# Patient Record
Sex: FEMALE | Race: WHITE | NOT HISPANIC OR LATINO | Employment: FULL TIME | URBAN - METROPOLITAN AREA
[De-identification: names, ages, dates, MRNs, and addresses within clinical notes are randomized per-mention and may not be internally consistent; named-entity substitution may affect disease eponyms.]

---

## 2017-01-23 DIAGNOSIS — I10 BENIGN ESSENTIAL HYPERTENSION: Primary | ICD-10-CM

## 2017-01-23 NOTE — TELEPHONE ENCOUNTER
hydrochlorothiazide (HYDRODIURIL) 25 MG tablet 90 tablet 0 10/28/2016          Last Written Prescription Date: 10/28/2016  Last Fill Quantity: 90, # refills: 0  Last Office Visit with [facility], P or [facility] prescribing provider: 12/16/2016   Next 5 appointments (look out 90 days)     Jan 24, 2017  8:30 AM   [facility] Visit with Zeke Todd MD   [facility]    [address] [address]   [phone]                   POTASSIUM   Date Value Ref Range Status   12/21/2015 3.8 mmol/L Final     CREATININE   Date Value Ref Range Status   02/19/2016 48 mg/dL Final     BP Readings from Last 3 Encounters:   12/16/16 128/88   11/16/16 133/94   01/15/16 110/83

## 2017-01-25 RX ORDER — HYDROCHLOROTHIAZIDE 25 MG/1
TABLET ORAL
Qty: 90 TABLET | Refills: 3 | Status: SHIPPED | OUTPATIENT
Start: 2017-01-25 | End: 2017-07-25

## 2017-02-09 ENCOUNTER — OFFICE VISIT (OUTPATIENT)
Dept: FAMILY MEDICINE | Facility: CLINIC | Age: 52
End: 2017-02-09
Payer: COMMERCIAL

## 2017-02-09 VITALS
HEART RATE: 74 BPM | TEMPERATURE: 96.7 F | DIASTOLIC BLOOD PRESSURE: 91 MMHG | SYSTOLIC BLOOD PRESSURE: 128 MMHG | WEIGHT: 195 LBS | HEIGHT: 63 IN | BODY MASS INDEX: 34.55 KG/M2 | OXYGEN SATURATION: 95 %

## 2017-02-09 PROCEDURE — 99213 OFFICE O/P EST LOW 20 MIN: CPT | Performed by: INTERNAL MEDICINE

## 2017-02-09 RX ORDER — PHENTERMINE HYDROCHLORIDE 30 MG/1
30 CAPSULE ORAL EVERY MORNING
Qty: 30 CAPSULE | Refills: 0 | Status: SHIPPED | OUTPATIENT
Start: 2017-02-09 | End: 2017-03-23

## 2017-02-09 ASSESSMENT — ENCOUNTER SYMPTOMS
ABDOMINAL PAIN: 0
NERVOUS/ANXIOUS: 0
DIARRHEA: 0
TREMORS: 0
CONSTIPATION: 0
SHORTNESS OF BREATH: 0
DEPRESSION: 0
NAUSEA: 0
INSOMNIA: 0
HEADACHES: 0
PALPITATIONS: 0
VOMITING: 0

## 2017-02-09 ASSESSMENT — LIFESTYLE VARIABLES: SUBSTANCE_ABUSE: 0

## 2017-02-09 NOTE — PROGRESS NOTES
"  SUBJECTIVE:                                                    Susi Hernandezsury Rossi is a 51 year old female who presents to clinic today for the following health issues:      Medication Followup of Phentermine    Taking Medication as prescribed: yes    Side Effects:  None    Medication Helping Symptoms:  yes       {additional problems for provider to add:385899}    Problem list and histories reviewed & adjusted, as indicated.  Additional history: {NONE - AS DOCUMENTED:327618::\"as documented\"}    {HIST REVIEW/ LINKS 2:351590}    {PROVIDER CHARTING PREFERENCE:648718}  "

## 2017-02-09 NOTE — NURSING NOTE
"Chief Complaint   Patient presents with     RECHECK       Initial /91 mmHg  Pulse 74  Temp(Src) 96.7  F (35.9  C) (Tympanic)  Ht 5' 3\" (1.6 m)  Wt 195 lb (88.451 kg)  BMI 34.55 kg/m2  SpO2 95% Estimated body mass index is 34.55 kg/(m^2) as calculated from the following:    Height as of this encounter: 5' 3\" (1.6 m).    Weight as of this encounter: 195 lb (88.451 kg).  Medication Reconciliation: complete   Left arm, large cuff.   Nolvia Warren MA      "

## 2017-02-09 NOTE — MR AVS SNAPSHOT
"              After Visit Summary   2/9/2017    Susi Rossi    MRN: [id number]           Patient Information     Date Of Birth          1965        Visit Information        Provider Department      2/9/2017 9:30 AM Zeke Todd MD [facility]        Today's Diagnoses     BMI 34.0-34.9,adult    -  1       Care Instructions    Continue with the meal plan.  Maintain 0501-2690 daily calorie intake.    Follow up in 1 month.                Follow-ups after your visit        Who to contact     If you have questions or need follow up information about today's clinic visit or your schedule please contact [facility] directly at [phone].  Normal or non-critical lab and imaging results will be communicated to you by MyChart, letter or phone within 4 business days after the clinic has received the results. If you do not hear from us within 7 days, please contact the clinic through MyChart or phone. If you have a critical or abnormal lab result, we will notify you by phone as soon as possible.  Submit refill requests through [employer] or call your pharmacy and they will forward the refill request to us. Please allow 3 business days for your refill to be completed.          Additional Information About Your Visit        Care EveryWhere ID     This is your Care EveryWhere ID. This could be used by other organizations to access your [facility] medical records  [id number]        Your Vitals Were     Pulse Temperature Height BMI (Body Mass Index) Pulse Oximetry       74 96.7  F (35.9  C) (Tympanic) 5' 3\" (1.6 m) 34.55 kg/m2 95%        Blood Pressure from Last 3 Encounters:   02/09/17 128/91   12/16/16 128/88   11/16/16 133/94    Weight from Last 3 Encounters:   02/09/17 195 lb (88.451 kg)   12/16/16 194 lb (87.998 kg)   11/16/16 195 lb (88.451 kg)              Today, you had the following     No orders found for display         Where to get your medicines      Some of " these will need a paper prescription and others can be bought over the counter.  Ask your nurse if you have questions.     Bring a paper prescription for each of these medications    - phentermine 30 MG capsule       Primary Care Provider Office Phone # Fax #    Zeke Darwin Todd [phone]       [facility] [address]  [facility] [address]        Thank you!     Thank you for choosing [facility]  for your care. Our goal is always to provide you with excellent care. Hearing back from our patients is one way we can continue to improve our services. Please take a few minutes to complete the written survey that you may receive in the mail after your visit with us. Thank you!             Your Updated Medication List - Protect others around you: Learn how to safely use, store and throw away your medicines at www.disposemymeds.org.          This list is accurate as of: 2/9/17  9:47 AM.  Always use your most recent med list.                   Brand Name Dispense Instructions for use    ASPIRIN EC PO      Take 81 mg by mouth daily       ATORVASTATIN CALCIUM PO      Take 10 mg by mouth daily       * hydrochlorothiazide 25 MG tablet    HYDRODIURIL    30 tablet    TAKE 1 TABLET (25 MG) BY MOUTH DAILY       * hydrochlorothiazide 25 MG tablet    HYDRODIURIL    90 tablet    TAKE 1 TABLET (25 MG) BY MOUTH DAILY       LEVOTHROID PO      Take 175 mcg by mouth daily       multivitamin, therapeutic with minerals Tabs tablet      Take 1 tablet by mouth daily Has been using Emergen C vitamins       order for DME     1 Device    Equipment being ordered: blood pressure cuff       phentermine 30 MG capsule     30 capsule    Take 1 capsule (30 mg) by mouth every morning       VITAMIN D3 PO      Take 5,000 Units by mouth daily       * Notice:  This list has 2 medication(s) that are the same as other medications prescribed for you. Read the directions carefully, and ask your  doctor or other care provider to review them with you.

## 2017-02-09 NOTE — PATIENT INSTRUCTIONS
Continue with the meal plan.  Maintain 8595-9504 daily calorie intake.    Follow up in 1 month.

## 2017-02-09 NOTE — PROGRESS NOTES
"HPI Comments:   I last saw the patient at the clinic on 12/16/2016 for weight management.  I started her on phentermine 30mg daily and advised her on a low calorie meal plan.    Since her last visit, she has gained 1 pound. She states that the phentermine is sufficient in controlling her appetite, but when she ran out of the medication it was most likely that her caloric intake increased. Although she uses the my fitness pal application, she does not check her daily caloric intake. Has not been consistently following the meal plan as well.      Past Medical History   Diagnosis Date     Thyroid disease      Benign essential hypertension      Gallstones      Hyperlipidemia        Review of Systems   Constitutional: Negative for malaise/fatigue.   Respiratory: Negative for shortness of breath.    Cardiovascular: Negative for chest pain and palpitations.   Gastrointestinal: Negative for nausea, vomiting, abdominal pain, diarrhea and constipation.   Neurological: Negative for tremors and headaches.   Psychiatric/Behavioral: Negative for depression and substance abuse. The patient is not nervous/anxious and does not have insomnia.        /91 mmHg  Pulse 74  Temp(Src) 96.7  F (35.9  C) (Tympanic)  Ht 5' 3\" (1.6 m)  Wt 195 lb (88.451 kg)  BMI 34.55 kg/m2  SpO2 95%      Physical Exam   Constitutional: She is oriented to person, place, and time. No distress.   Neck: No thyromegaly present.   Cardiovascular: Normal rate, regular rhythm and normal heart sounds.    Pulmonary/Chest: Effort normal and breath sounds normal. No respiratory distress.   Abdominal: Soft. There is no tenderness.   Neurological: She is alert and oriented to person, place, and time. Coordination normal. GCS score is 15.   No tremors   Psychiatric: Mood and affect normal.   Vitals reviewed.        ICD-10-CM    1. BMI 34.0-34.9,adult Z68.34 phentermine 30 MG capsule         Patient Instructions   Continue with the meal plan.  Maintain 8169-8093 " daily calorie intake.    Follow up in 1 month.

## 2017-03-15 NOTE — TELEPHONE ENCOUNTER
Pending Prescriptions:                       Disp   Refills    phentermine 30 MG capsule                 30 cap*0            Sig: Take 1 capsule (30 mg) by mouth every morning          Last Written Prescription Date: 02/09/17  Last Fill Quantity: 30,  # refills: 0   Last Office Visit with [facility], [facility] or [facility] prescribing provider: 02/09/17                                         Next 5 appointments (look out 90 days)     Mar 23, 2017  6:30 PM CDT   [facility] with [facility]   [facility]    [address] [facility] [address]   [phone]

## 2017-03-15 NOTE — TELEPHONE ENCOUNTER
Reason for Call:  Medication or medication refill:    Do you use a [facility]?  Name of the pharmacy and phone number for the current request:    [id number] - [place], [place] - [address]      Name of the medication requested: phentermine 30 MG capsule    Other request: pt changed appt to a later date.    Can we leave a detailed message on this number? YES    Phone number patient can be reached at: Home number on file [phone]    Best Time: anytime    Call taken on 3/15/2017 at 4:43 PM by Graciela Ashraf

## 2017-03-16 RX ORDER — PHENTERMINE HYDROCHLORIDE 30 MG/1
30 CAPSULE ORAL EVERY MORNING
Qty: 30 CAPSULE | Refills: 0 | OUTPATIENT
Start: 2017-03-16

## 2017-03-16 NOTE — TELEPHONE ENCOUNTER
Routing refill request to provider for review/approval because:  Drug not on the [facility] refill protocol   Annetta Rea RN

## 2017-03-23 ENCOUNTER — OFFICE VISIT (OUTPATIENT)
Dept: FAMILY MEDICINE | Facility: CLINIC | Age: 52
End: 2017-03-23
Payer: COMMERCIAL

## 2017-03-23 VITALS
BODY MASS INDEX: 34.12 KG/M2 | HEIGHT: 63 IN | HEART RATE: 82 BPM | SYSTOLIC BLOOD PRESSURE: 121 MMHG | TEMPERATURE: 97.1 F | WEIGHT: 192.6 LBS | DIASTOLIC BLOOD PRESSURE: 83 MMHG | OXYGEN SATURATION: 93 %

## 2017-03-23 PROCEDURE — 99213 OFFICE O/P EST LOW 20 MIN: CPT | Performed by: INTERNAL MEDICINE

## 2017-03-23 RX ORDER — PHENTERMINE HYDROCHLORIDE 30 MG/1
30 CAPSULE ORAL EVERY MORNING
Qty: 30 CAPSULE | Refills: 0 | Status: SHIPPED | OUTPATIENT
Start: 2017-03-23 | End: 2018-01-24

## 2017-03-23 ASSESSMENT — ENCOUNTER SYMPTOMS
CONSTIPATION: 0
TREMORS: 0
HEADACHES: 0
SHORTNESS OF BREATH: 0
ABDOMINAL PAIN: 0
DIARRHEA: 0
PALPITATIONS: 0
INSOMNIA: 0
VOMITING: 0
DEPRESSION: 0
NAUSEA: 0
NERVOUS/ANXIOUS: 0

## 2017-03-23 ASSESSMENT — LIFESTYLE VARIABLES: SUBSTANCE_ABUSE: 0

## 2017-03-23 NOTE — MR AVS SNAPSHOT
"              After Visit Summary   3/23/2017    Susi Rossi    MRN: [id number]           Patient Information     Date Of Birth          1965        Visit Information        Provider Department      3/23/2017 6:30 PM Zeke Todd MD [facility]        Today's Diagnoses     BMI 34.0-34.9,adult          Care Instructions    Try consuming a shake or fruits immediately before dinner.    Try to eat throughout the day.    Follow up in 1 month.         Follow-ups after your visit        Who to contact     If you have questions or need follow up information about today's clinic visit or your schedule please contact [facility] directly at [phone].  Normal or non-critical lab and imaging results will be communicated to you by [facility], letter or phone within 4 business days after the clinic has received the results. If you do not hear from us within 7 days, please contact the clinic through [facility] or phone. If you have a critical or abnormal lab result, we will notify you by phone as soon as possible.  Submit refill requests through [facility] or call your pharmacy and they will forward the refill request to us. Please allow 3 business days for your refill to be completed.          Additional Information About Your Visit        MyChart Information     [facility] lets you send messages to your doctor, view your test results, renew your prescriptions, schedule appointments and more. To sign up, go to [email or web address] . Click on \"Log in\" on the left side of the screen, which will take you to the Welcome page. Then click on \"Sign up Now\" on the right side of the page.     You will be asked to enter the access code listed below, as well as some personal information. Please follow the directions to create your username and password.     Your access code is: [email or web address]  Expires: 2017  7:01 PM     Your access code will  in 90 days. If you need help or " "a new code, please call your [facility] clinic or [phone].        Care EveryWhere ID     This is your Care EveryWhere ID. This could be used by other organizations to access your [facility] medical records  [id number]        Your Vitals Were     Pulse Temperature Height Pulse Oximetry Breastfeeding? BMI (Body Mass Index)    82 97.1  F (36.2  C) (Tympanic) 5' 3\" (1.6 m) 93% No 34.12 kg/m2       Blood Pressure from Last 3 Encounters:   03/23/17 121/83   02/09/17 (!) 128/91   12/16/16 128/88    Weight from Last 3 Encounters:   03/23/17 192 lb 9.6 oz (87.4 kg)   02/09/17 195 lb (88.5 kg)   12/16/16 194 lb (88 kg)              Today, you had the following     No orders found for display         Where to get your medicines      Some of these will need a paper prescription and others can be bought over the counter.  Ask your nurse if you have questions.     Bring a paper prescription for each of these medications     phentermine 30 MG capsule          Primary Care Provider Office Phone # Fax #    Zeke Darwin Todd [phone]       [facility] [address] [facility] [address]  [facility] [address]        Thank you!     Thank you for choosing [facility]  for your care. Our goal is always to provide you with excellent care. Hearing back from our patients is one way we can continue to improve our services. Please take a few minutes to complete the written survey that you may receive in the mail after your visit with us. Thank you!             Your Updated Medication List - Protect others around you: Learn how to safely use, store and throw away your medicines at www.disposemymeds.org.          This list is accurate as of: 3/23/17  7:01 PM.  Always use your most recent med list.                   Brand Name Dispense Instructions for use    ASPIRIN EC PO      Take 81 mg by mouth daily       ATORVASTATIN CALCIUM PO      Take 10 mg by mouth daily       * hydrochlorothiazide 25 MG " tablet    HYDRODIURIL    30 tablet    TAKE 1 TABLET (25 MG) BY MOUTH DAILY       * hydrochlorothiazide 25 MG tablet    HYDRODIURIL    90 tablet    TAKE 1 TABLET (25 MG) BY MOUTH DAILY       LEVOTHROID PO      Take 175 mcg by mouth daily       multivitamin, therapeutic with minerals Tabs tablet      Take 1 tablet by mouth daily Has been using Emer[employer] C vitamins       order for DME     1 Device    Equipment being ordered: blood pressure cuff       phentermine 30 MG capsule     30 capsule    Take 1 capsule (30 mg) by mouth every morning       VITAMIN D3 PO      Take 5,000 Units by mouth daily       * Notice:  This list has 2 medication(s) that are the same as other medications prescribed for you. Read the directions carefully, and ask your doctor or other care provider to review them with you.

## 2017-03-23 NOTE — NURSING NOTE
"Chief Complaint   Patient presents with     Follow Up For     Weight Mangaement       Initial /83 (BP Location: Left arm, Patient Position: Chair, Cuff Size: Adult Regular)  Pulse 82  Temp 97.1  F (36.2  C) (Tympanic)  Ht 5' 3\" (1.6 m)  Wt 192 lb 9.6 oz (87.4 kg)  SpO2 93%  Breastfeeding? No  BMI 34.12 kg/m2 Estimated body mass index is 34.12 kg/(m^2) as calculated from the following:    Height as of this encounter: 5' 3\" (1.6 m).    Weight as of this encounter: 192 lb 9.6 oz (87.4 kg).  Medication Reconciliation: complete       BRITTANY Jessica MA March 23, 2017 6:18 PM      "

## 2017-03-24 NOTE — PATIENT INSTRUCTIONS
Try consuming a shake or fruits immediately before dinner.    Try to eat throughout the day.    Follow up in 1 month.

## 2017-06-27 ENCOUNTER — HOSPITAL ENCOUNTER (OUTPATIENT)
Dept: MAMMOGRAPHY | Facility: CLINIC | Age: 52
Discharge: HOME | End: 2017-06-27
Attending: OBSTETRICS & GYNECOLOGY | Admitting: OBSTETRICS & GYNECOLOGY
Payer: COMMERCIAL

## 2017-06-27 DIAGNOSIS — Z12.31 VISIT FOR SCREENING MAMMOGRAM: ICD-10-CM

## 2017-06-27 PROCEDURE — 77063 BREAST TOMOSYNTHESIS BI: CPT

## 2017-06-27 PROCEDURE — G0202 SCR MAMMO BI INCL CAD: HCPCS

## 2017-07-01 ENCOUNTER — TRANSFERRED RECORDS (OUTPATIENT)
Dept: HEALTH INFORMATION MANAGEMENT | Facility: CLINIC | Age: 52
End: 2017-07-01

## 2017-07-01 LAB — PAP SMEAR - HIM PATIENT REPORTED: NEGATIVE

## 2017-07-25 ENCOUNTER — OFFICE VISIT (OUTPATIENT)
Dept: URGENT CARE | Facility: URGENT CARE | Age: 52
End: 2017-07-25
Payer: COMMERCIAL

## 2017-07-25 VITALS
OXYGEN SATURATION: 98 % | DIASTOLIC BLOOD PRESSURE: 89 MMHG | SYSTOLIC BLOOD PRESSURE: 128 MMHG | HEART RATE: 86 BPM | TEMPERATURE: 98.1 F

## 2017-07-25 DIAGNOSIS — J06.9 VIRAL UPPER RESPIRATORY TRACT INFECTION: ICD-10-CM

## 2017-07-25 DIAGNOSIS — J30.2 ACUTE SEASONAL ALLERGIC RHINITIS, UNSPECIFIED TRIGGER: Primary | ICD-10-CM

## 2017-07-25 LAB
DEPRECATED S PYO AG THROAT QL EIA: NORMAL
MICRO REPORT STATUS: NORMAL
SPECIMEN SOURCE: NORMAL

## 2017-07-25 PROCEDURE — 87081 CULTURE SCREEN ONLY: CPT | Performed by: PHYSICIAN ASSISTANT

## 2017-07-25 PROCEDURE — 87880 STREP A ASSAY W/OPTIC: CPT | Performed by: PHYSICIAN ASSISTANT

## 2017-07-25 PROCEDURE — 99213 OFFICE O/P EST LOW 20 MIN: CPT | Performed by: PHYSICIAN ASSISTANT

## 2017-07-25 RX ORDER — FLUTICASONE PROPIONATE 50 MCG
1-2 SPRAY, SUSPENSION (ML) NASAL DAILY
Qty: 1 BOTTLE | Refills: 0 | Status: SHIPPED | OUTPATIENT
Start: 2017-07-25

## 2017-07-25 RX ORDER — CODEINE PHOSPHATE AND GUAIFENESIN 10; 100 MG/5ML; MG/5ML
1 SOLUTION ORAL EVERY 4 HOURS PRN
Qty: 120 ML | Refills: 0 | Status: SHIPPED | OUTPATIENT
Start: 2017-07-25 | End: 2018-01-24

## 2017-07-25 ASSESSMENT — ENCOUNTER SYMPTOMS
HEADACHES: 0
COUGH: 1
FEVER: 0
FOCAL WEAKNESS: 0
CHILLS: 0
DIARRHEA: 0
ABDOMINAL PAIN: 0
SORE THROAT: 1
SHORTNESS OF BREATH: 0
VOMITING: 0
NAUSEA: 0

## 2017-07-25 NOTE — MR AVS SNAPSHOT
"              After Visit Summary   7/25/2017    Susi Rossi    MRN: [id number]           Patient Information     Date Of Birth          1965        Visit Information        Provider Department      7/25/2017 8:45 PM Gladys Rivers PA-C [facility]        Today's Diagnoses     Acute seasonal allergic rhinitis, unspecified trigger    -  1    Viral upper respiratory tract infection           Follow-ups after your visit        Follow-up notes from your care team     Return if symptoms worsen or fail to improve.      Who to contact     If you have questions or need follow up information about today's clinic visit or your schedule please contact [facility] directly at [phone].  Normal or non-critical lab and imaging results will be communicated to you by MyChart, letter or phone within 4 business days after the clinic has received the results. If you do not hear from us within 7 days, please contact the clinic through MyChart or phone. If you have a critical or abnormal lab result, we will notify you by phone as soon as possible.  Submit refill requests through [employer] or call your pharmacy and they will forward the refill request to us. Please allow 3 business days for your refill to be completed.          Additional Information About Your Visit        MyChart Information     [employer] lets you send messages to your doctor, view your test results, renew your prescriptions, schedule appointments and more. To sign up, go to [email or web address] . Click on \"Log in\" on the left side of the screen, which will take you to the Welcome page. Then click on \"Sign up Now\" on the right side of the page.     You will be asked to enter the access code listed below, as well as some personal information. Please follow the directions to create your username and password.     Your access code is: [email or web address]  Expires: 10/26/2017  7:33 AM     Your access " code will  in 90 days. If you need help or a new code, please call your [facility] clinic or [phone].        Care EveryWhere ID     This is your Care EveryWhere ID. This could be used by other organizations to access your [facility] medical records  [id number]        Your Vitals Were     Pulse Temperature Pulse Oximetry Breastfeeding?          86 98.1  F (36.7  C) (Oral) 98% No         Blood Pressure from Last 3 Encounters:   17 128/89   17 121/83   17 (!) 128/91    Weight from Last 3 Encounters:   17 192 lb 9.6 oz (87.4 kg)   17 195 lb (88.5 kg)   16 194 lb (88 kg)              We Performed the Following     Beta strep group A culture     Strep, Rapid Screen          Today's Medication Changes          These changes are accurate as of: 17 11:59 PM.  If you have any questions, ask your nurse or doctor.               Start taking these medicines.        Dose/Directions    fluticasone 50 MCG/ACT spray   Commonly known as:  FLONASE   Used for:  Acute seasonal allergic rhinitis, unspecified trigger   Started by:  Gladys Rivers PA-C        Dose:  1-2 spray   Spray 1-2 sprays into both nostrils daily   Quantity:  1 Bottle   Refills:  0       guaiFENesin-codeine 100-10 MG/5ML Soln solution   Commonly known as:  ROBITUSSIN AC   Used for:  Viral upper respiratory tract infection   Started by:  Gladys Rivers PA-C        Dose:  1 tsp.   Take 5 mLs by mouth every 4 hours as needed for cough   Quantity:  120 mL   Refills:  0            Where to get your medicines      These medications were sent to [id number] - [place], [place] - [address]  [address] [address]     Phone:  [phone]     fluticasone 50 MCG/ACT spray         Some of these will need a paper prescription and others can be bought over the counter.  Ask your nurse if you have questions.     Bring a paper prescription for each of these medications     guaiFENesin-codeine  100-10 MG/5ML Soln solution                Primary Care Provider Office Phone # Fax #    Zeke Darwin Todd [phone]       [facility] [address]  [facility] [address]        Equal Access to Services     SUN MERIDA : Hadii aad ku hadasho Soomaali, waaxda luqadaha, qaybta kaalmada adeegyada, waxay idiin hayaan adecarolee khprestonjose lamilo fam. So [facility] [phone].    ATENCIÓN: Si habla español, tiene a rendon disposición servicios gratuitos de asistencia lingüística. [facility] [phone].    We comply with applicable federal civil rights laws and [place] laws. We do not discriminate on the basis of race, color, national origin, age, disability sex, sexual orientation or gender identity.            Thank you!     Thank you for choosing [facility]  for your care. Our goal is always to provide you with excellent care. Hearing back from our patients is one way we can continue to improve our services. Please take a few minutes to complete the written survey that you may receive in the mail after your visit with us. Thank you!             Your Updated Medication List - Protect others around you: Learn how to safely use, store and throw away your medicines at www.disposemymeds.org.          This list is accurate as of: 7/25/17 11:59 PM.  Always use your most recent med list.                   Brand Name Dispense Instructions for use Diagnosis    ASPIRIN EC PO      Take 81 mg by mouth daily        ATORVASTATIN CALCIUM PO      Take 10 mg by mouth daily        fluticasone 50 MCG/ACT spray    FLONASE    1 Bottle    Spray 1-2 sprays into both nostrils daily    Acute seasonal allergic rhinitis, unspecified trigger       guaiFENesin-codeine 100-10 MG/5ML Soln solution    ROBITUSSIN AC    120 mL    Take 5 mLs by mouth every 4 hours as needed for cough    Viral upper respiratory tract infection       hydrochlorothiazide 25 MG tablet    HYDRODIURIL    30 tablet     TAKE 1 TABLET (25 MG) BY MOUTH DAILY    Benign essential hypertension       LEVOTHROID PO      Take 175 mcg by mouth daily        multivitamin, therapeutic with minerals Tabs tablet      Take 1 tablet by mouth daily Has been using Emergen C vitamins        phentermine 30 MG capsule     30 capsule    Take 1 capsule (30 mg) by mouth every morning    BMI 34.0-34.9,adult       VITAMIN D3 PO      Take 5,000 Units by mouth daily

## 2017-07-26 LAB
BACTERIA SPEC CULT: NORMAL
MICRO REPORT STATUS: NORMAL
SPECIMEN SOURCE: NORMAL

## 2017-07-26 NOTE — NURSING NOTE
"Chief Complaint   Patient presents with     Urgent Care     Pharyngitis     Sore throat started a couple of days ago but coughing has been on and off for one month.        Initial /89 (BP Location: Right arm, Cuff Size: Adult Large)  Pulse 86  Temp 98.1  F (36.7  C) (Oral)  SpO2 98%  Breastfeeding? No Estimated body mass index is 34.12 kg/(m^2) as calculated from the following:    Height as of 3/23/17: 5' 3\" (1.6 m).    Weight as of 3/23/17: 192 lb 9.6 oz (87.4 kg).  Medication Reconciliation: complete.  JENNIFER Green      "

## 2018-01-19 ENCOUNTER — TELEPHONE (OUTPATIENT)
Dept: FAMILY MEDICINE | Facility: CLINIC | Age: 53
End: 2018-01-19

## 2018-01-19 NOTE — TELEPHONE ENCOUNTER
Reason for call:  Patient reporting a symptom    Symptom or request: Possible pneumonia or bronchitis-Rattly chest , cough, chest congestion, body aches, no fever    Duration (how long have symptoms been present): some symptoms started last week, cough has gotten bad since yesterday.    Have you been treated for this before? No    Additional comments: patient scheduled with Dr. Todd for 4:30 today, but let her know if she should not come in.    Phone Number patient can be reached at:  Home number on file [phone] (home)    Best Time:  anytime    Can we leave a detailed message on this number:  YES    Call taken on 1/19/2018 at 7:51 AM by Jyoti Veliz  .

## 2018-01-19 NOTE — TELEPHONE ENCOUNTER
Influenza-Like Illness (RADHA) Protocol    Susi Roth Srinath Enid      Age: 52 year old     YOB: 1965    Are you currently sick or have you had close contact with someone who is currently sick?   Yes, this patient is currently sick.     Adult Clinical Evaluation    Is this patient experiencing ANY of the following?  Unconsciousness or unresponsiveness No   Difficulty breathing or swallowing No   Blue or dusky lips, skin, or nail beds No   Chest pain No   Severe confusion or delirium No   Seizure activity: ongoing or stopped No   Severe dehydration or signs of shock No   Patient sounds very sick on the phone No     Is this patient experiencing ANY of the following?  Fever > 104 or shaking chills No   Wheezing with minimal response to usual wheezing medications or new wheezing No   Repeated vomiting or diarrhea with signs of dehydration (no urination within last 12 hours) No   Flu-like symptoms that initially improved but returned with fever and a worse cough No   Stiff or painful neck No   Severe headache No     Does the patient have any of the following?  Measured fever > 100 degrees No   Chills or feels very warm to the touch No   Cough Yes   Sore throat No   Muscle/ body aches No   Headaches No   Fatigue (tiredness) No     Nursing Plan    Provided home care instructions    General home care instruction:      Avoid contact with people in your household who are at increased risk for more severe complications of influenza (such as pregnant women or people who have a chronic health condition, for example diabetes, heart disease, asthma, or emphysema).    Stay home from work, school, childcare or other public places until your fever (37.8 degrees Celsius [100 degrees Fahrenheit]) has been gone for at least 24 hours, except to seek medical care. (Fever should be gone without the use of fever-reducing medications.) Use a surgical mask if available, or cover your mouth and nose with a tissue if possible  if you need to seek medical care. Contact your work place, school, or  as they may have longer exclusion times.    You may continue to shed virus after your fever is gone. Limit your contact with high-risk individuals for 10 days after your symptoms started and be especially careful to cover your coughs/sneezes and wash your hands.    Cover your cough and wash your hands often, and especially after coughing, sneezing, blowing your nose.    Drink plenty of fluids (such as water, broth, sports drinks, electrolyte beverages for children) to prevent dehydration.    Avoid tobacco and second hand smoke.    Get plenty of rest.    Use over-the-counter pain relievers as needed per  instructions.    Do not give aspirin (acetylsalicylic acid) or products that contain aspirin (e.g. bismuth subsalicylate - Pepto Bismol) to children or teenagers 18 years or younger.    A small number of people with influenza do not have fever. If you have respiratory symptoms and are at increased risk for complications of influenza, contact your health care provider to discuss these symptoms.    For parents of infants:    If possible, only family members who are not sick should care for infants.    Wash your hands with soap and water, or an alcohol-based hand rub (if your hands are not visibly soiled) before caring for your infant.    Cover your mouth and nose with a tissue when coughing or sneezing, and clean your hands.    Contact a health care provider to discuss your illness within 1-2 days if you are    Pregnant    Immunocompromised    Call 911 if you experience:    Difficulty breathing or shortness of breath    Pain or pressure in the chest    Confusion or less responsive than normal    Seizure activity: ongoing or stopped    Severe dehydration or signs of shock     Blue or dusky lips, skin, or nail beds    If further questions/concerns or if new symptoms develop, call your PCP or [facility] as soon as  possible.    When to seek medical attention    Contact your health care provider right away if you experience:    A painful sore throat accompanied by fever persists for more than 48 hours    Ear pain, sinus pain, persistent vomiting and/or diarrhea    Oral temperature greater than 104  Fahrenheit (40  Celsius)    Dehydration (e.g., mouth feeling dry, dizzy when sitting/standing, decreased urine output)    Severe or persistent vomiting; unable to keep fluids down    Improvement in flu-like symptoms (fever and cough or sore throat) but then return of fever and worse cough or sore throat    Not drinking enough fluid    Any other concerns not stated above        Additional educational resources include:    http://www.Fashionchick.com    http://www.cdc.gov/flu/  Sheila Hansen

## 2018-01-24 ENCOUNTER — TELEPHONE (OUTPATIENT)
Dept: FAMILY MEDICINE | Facility: CLINIC | Age: 53
End: 2018-01-24

## 2018-01-24 ENCOUNTER — OFFICE VISIT (OUTPATIENT)
Dept: FAMILY MEDICINE | Facility: CLINIC | Age: 53
End: 2018-01-24
Payer: COMMERCIAL

## 2018-01-24 VITALS
WEIGHT: 205 LBS | TEMPERATURE: 98.2 F | HEART RATE: 82 BPM | SYSTOLIC BLOOD PRESSURE: 127 MMHG | HEIGHT: 62 IN | OXYGEN SATURATION: 95 % | DIASTOLIC BLOOD PRESSURE: 92 MMHG | BODY MASS INDEX: 37.73 KG/M2

## 2018-01-24 DIAGNOSIS — I10 BENIGN ESSENTIAL HYPERTENSION: ICD-10-CM

## 2018-01-24 DIAGNOSIS — H10.32 ACUTE CONJUNCTIVITIS OF LEFT EYE, UNSPECIFIED ACUTE CONJUNCTIVITIS TYPE: Primary | ICD-10-CM

## 2018-01-24 DIAGNOSIS — R05.9 COUGH: ICD-10-CM

## 2018-01-24 PROCEDURE — 99213 OFFICE O/P EST LOW 20 MIN: CPT | Performed by: NURSE PRACTITIONER

## 2018-01-24 RX ORDER — POLYMYXIN B SULFATE AND TRIMETHOPRIM 1; 10000 MG/ML; [USP'U]/ML
1 SOLUTION OPHTHALMIC 4 TIMES DAILY
Qty: 2 ML | Refills: 0 | Status: SHIPPED | OUTPATIENT
Start: 2018-01-24 | End: 2018-01-31

## 2018-01-24 RX ORDER — HYDROCHLOROTHIAZIDE 25 MG/1
TABLET ORAL
Qty: 90 TABLET | Refills: 1 | Status: SHIPPED | OUTPATIENT
Start: 2018-01-24 | End: 2018-04-02 | Stop reason: ALTCHOICE

## 2018-01-24 RX ORDER — PREDNISONE 20 MG/1
40 TABLET ORAL DAILY
Qty: 10 TABLET | Refills: 0 | Status: SHIPPED | OUTPATIENT
Start: 2018-01-24 | End: 2018-01-29

## 2018-01-24 NOTE — TELEPHONE ENCOUNTER
Called patient:     Scheduled OV with Team Provider for today per PCP recommendations.     Corin PASTRANA RN

## 2018-01-24 NOTE — PROGRESS NOTES
"HPI      SUBJECTIVE:   Susi Rossi is a 52 year old female who presents to clinic today for the following health issues:      Conjunctivitis   Sx began yesterday.  dx with conjunctivitis 2 days ago, was seen here in clinic.   Left eye is red, weepy and watery.    No photophobia. Not itchy  Had influenza a couple weeks ago with persistent cough   Is overall improved   No wheezing   No recent fevers      Past Medical History:   Diagnosis Date     Benign essential hypertension      Gallstones      Hyperlipidemia      Thyroid disease      Past Surgical History:   Procedure Laterality Date     LAPAROSCOPIC CHOLECYSTECTOMY  5/6/2014    Procedure: LAPAROSCOPIC CHOLECYSTECTOMY;  Surgeon: Chay Shafer MD;  Location:  OR     Social History   Substance Use Topics     Smoking status: Never Smoker     Smokeless tobacco: Never Used     Alcohol use 0.0 oz/week     0 Standard drinks or equivalent per week     Current Outpatient Prescriptions   Medication Sig Dispense Refill     fluticasone (FLONASE) 50 MCG/ACT spray Spray 1-2 sprays into both nostrils daily 1 Bottle 0     hydrochlorothiazide (HYDRODIURIL) 25 MG tablet TAKE 1 TABLET (25 MG) BY MOUTH DAILY 30 tablet 0     ATORVASTATIN CALCIUM PO Take 10 mg by mouth daily       Levothyroxine Sodium (LEVOTHROID PO) Take 175 mcg by mouth daily        Cholecalciferol (VITAMIN D3 PO) Take 5,000 Units by mouth daily       ASPIRIN EC PO Take 81 mg by mouth daily       multivitamin, therapeutic with minerals (THERA-VIT-M) TABS Take 1 tablet by mouth daily Has been using Emergen C vitamins       Allergies   Allergen Reactions     Penicillins Other (See Comments)     Hives, shortness of breath         Reviewed and updated as needed this visit by clinical staff and provider      ROS  Detailed as above     BP (!) 127/92 (BP Location: Right arm, Cuff Size: Adult Large)  Pulse 82  Temp 98.2  F (36.8  C) (Tympanic)  Ht 5' 2\" (1.575 m)  Wt 205 lb (93 kg)  SpO2 " 95%  Breastfeeding? No  BMI 37.49 kg/m2      Physical Exam   Constitutional: She is well-developed, well-nourished, and in no distress.   HENT:   Head: Normocephalic.   Eyes: Left conjunctiva is injected.   Pulmonary/Chest: Effort normal and breath sounds normal. No respiratory distress.   Barky cough   Musculoskeletal: Normal range of motion.   Neurological: She is alert.   Skin: Skin is warm and dry.   Psychiatric: Mood and affect normal.   Vitals reviewed.      Assessment and Plan:       ICD-10-CM    1. Acute conjunctivitis of left eye, unspecified acute conjunctivitis type H10.32 trimethoprim-polymyxin b (POLYTRIM) ophthalmic solution   2. Benign essential hypertension I10 hydrochlorothiazide (HYDRODIURIL) 25 MG tablet   3. Cough R05 predniSONE (DELTASONE) 20 MG tablet       Will treat for conjunctivitis   If not improving or worsening, she may need to see ophthalmology   Also sent with prednisone for the harsh cough. She can start this at any time   Call or rtc prn       DANIELITO Davis, CNP  [facility]

## 2018-01-24 NOTE — TELEPHONE ENCOUNTER
Reason for Call:  Medication or medication refill:    Do you use a [facility]?  Name of the pharmacy and phone number for the current request:  [id number] - [place], [place] - [address]      Name of the medication requested: Something for pink eye.  Pt reports that the possible pinkeye began last night with goopey discharge, swollen.  She has had the cold that has been going around.  Her  came in for pink eye two nights ago for this.    Other request:     Can we leave a detailed message on this number? YES    Phone number patient can be reached at: Home number on file [phone] (home)    Best Time: any    Call taken on 1/24/2018 at 8:49 AM by Mamta Viveros

## 2018-01-24 NOTE — NURSING NOTE
"Chief Complaint   Patient presents with     Conjunctivitis Evaluation       Initial BP (!) 127/92 (BP Location: Right arm, Cuff Size: Adult Large)  Pulse 82  Temp 98.2  F (36.8  C) (Tympanic)  Ht 5' 2\" (1.575 m)  Wt 205 lb (93 kg)  SpO2 95%  Breastfeeding? No  BMI 37.49 kg/m2 Estimated body mass index is 37.49 kg/(m^2) as calculated from the following:    Height as of this encounter: 5' 2\" (1.575 m).    Weight as of this encounter: 205 lb (93 kg).  Medication Reconciliation: complete   Winnie Edouard MA      "

## 2018-01-24 NOTE — TELEPHONE ENCOUNTER
To PCP:     S: Pt experiencing Left Eye redness, matting (yellow/green drainage), watery eyes (denies itching/burning)     B: Pt exposed to Conjunctivitis,  [facility], prescribed gtts. (Though per pt MD suspected Viral) Pt and Spouse experienced URI with RADHA last week.     A: Triage assessment below.     R: OV? Rx? Watch and wait?     Please advise, thank you!  Corin PASTRANA RN    (FYI: Pt scheduled Physical Exam 2/6/18, will come fasting)       RN Conjunctivitis Protocol: Ages 2 and older  Susi Rossi is a 52 year old female is having symptoms reviewed for possible conjunctivitis.    NURSING ASSESSMENT:  Conjunctival symptoms: redness, mattering (yellow/green) and watery or pus discharge   Location: left eye  Onset: 1 day ago  In addition notes: Patient has been exposed to  formerly diagnosed x2 days ago in  Urgent Care (thought be more viral vs bacterial with recent hx of URI, RADHA illness, but given   Contact Lens use?: No  Complicating factors    Reports:NONE  Denies:NONE   Allergy to Sulfa?  No     NURSING PLAN: Routed to provider Yes    EDUCATION:  Education regarding contact precautions, hand washing, avoid wearing contacts until finished with drops or until symptoms resolve, contact clinic if there is no improvement of symptoms within 3 days and if develops eye pain or sensitivity to light.           Corin Vu RN

## 2018-01-24 NOTE — MR AVS SNAPSHOT
"              After Visit Summary   1/24/2018    Susi Rossi    MRN: [id number]           Patient Information     Date Of Birth          1965        Visit Information        Provider Department      1/24/2018 4:00 PM Beck Baez APRN CNP [facility]        Today's Diagnoses     Acute conjunctivitis of left eye, unspecified acute conjunctivitis type    -  1    Benign essential hypertension        Cough           Follow-ups after your visit        Your next 10 appointments already scheduled     Feb 06, 2018  9:00 AM CST   PHYSICAL with Zeke Todd MD   [facility]    [address] [facility] [address] [phone]              Who to contact     If you have questions or need follow up information about today's clinic visit or your schedule please contact [facility] directly at [phone].  Normal or non-critical lab and imaging results will be communicated to you by MyChart, letter or phone within 4 business days after the clinic has received the results. If you do not hear from us within 7 days, please contact the clinic through MyChart or phone. If you have a critical or abnormal lab result, we will notify you by phone as soon as possible.  Submit refill requests through [employer] or call your pharmacy and they will forward the refill request to us. Please allow 3 business days for your refill to be completed.          Additional Information About Your Visit        [employer]ixhart Information     Re[employer]io lets you send messages to your doctor, view your test results, renew your prescriptions, schedule appointments and more. To sign up, go to [email or web address] . Click on \"Log in\" on the left side of the screen, which will take you to the Welcome page. Then click on \"Sign up Now\" on the right side of the page.     You will be asked to enter the access code listed below, as well as some personal " "information. Please follow the directions to create your username and password.     Your access code is: [email or web address]  Expires: 2018  9:09 AM     Your access code will  in 90 days. If you need help or a new code, please call your [facility] clinic or [phone].        Care EveryWhere ID     This is your Care EveryWhere ID. This could be used by other organizations to access your [facility] medical records  [id number]        Your Vitals Were     Pulse Temperature Height Pulse Oximetry Breastfeeding? BMI (Body Mass Index)    82 98.2  F (36.8  C) (Tympanic) 5' 2\" (1.575 m) 95% No 37.49 kg/m2       Blood Pressure from Last 3 Encounters:   18 (!) 127/92   17 128/89   17 121/83    Weight from Last 3 Encounters:   18 205 lb (93 kg)   17 192 lb 9.6 oz (87.4 kg)   17 195 lb (88.5 kg)              Today, you had the following     No orders found for display         Today's Medication Changes          These changes are accurate as of 18 11:59 PM.  If you have any questions, ask your nurse or doctor.               Start taking these medicines.        Dose/Directions    predniSONE 20 MG tablet   Commonly known as:  DELTASONE   Used for:  Cough   Started by:  Beck Baez APRN CNP        Dose:  40 mg   Take 2 tablets (40 mg) by mouth daily for 5 days   Quantity:  10 tablet   Refills:  0       trimethoprim-polymyxin b ophthalmic solution   Commonly known as:  POLYTRIM   Used for:  Acute conjunctivitis of left eye, unspecified acute conjunctivitis type   Started by:  Beck Baez APRN CNP        Dose:  1 drop   Apply 1 drop to eye 4 times daily for 7 days   Quantity:  2 mL   Refills:  0         These medicines have changed or have updated prescriptions.        Dose/Directions    hydrochlorothiazide 25 MG tablet   Commonly known as:  HYDRODIURIL   This may have changed:  See the new instructions.   Used for:  Benign essential hypertension   Changed by:  Sasha" DANIELITO Orantes CNP        TAKE 1 TABLET (25 MG) BY MOUTH DAILY   Quantity:  90 tablet   Refills:  1            Where to get your medicines      These medications were sent to [id number] - [place], [place] - [address]  [address] [address]     Phone:  [phone]     hydrochlorothiazide 25 MG tablet    trimethoprim-polymyxin b ophthalmic solution         Some of these will need a paper prescription and others can be bought over the counter.  Ask your nurse if you have questions.     Bring a paper prescription for each of these medications     predniSONE 20 MG tablet                Primary Care Provider Office Phone # Fax #    Zeke Darwin Todd [phone] [address] [facility] [address]  [facility] [address]        Equal Access to Services     SUN MERIDA : Hadlala renee Sohusam, waaxda luqadaha, qaybta kaalmada adecaroleeyagutierrez, sha brandt . So [facility] [phone].    ATENCIÓN: Si habla español, tiene a rendon disposición servicios gratuitos de asistencia lingüística. Ll[facility] [phone].    We comply with applicable federal civil rights laws and [place] laws. We do not discriminate on the basis of race, color, national origin, age, disability, sex, sexual orientation, or gender identity.            Thank you!     Thank you for choosing [facility]  for your care. Our goal is always to provide you with excellent care. Hearing back from our patients is one way we can continue to improve our services. Please take a few minutes to complete the written survey that you may receive in the mail after your visit with us. Thank you!             Your Updated Medication List - Protect others around you: Learn how to safely use, store and throw away your medicines at www.disposemymeds.org.          This list is accurate as of 1/24/18 11:59 PM.  Always use your most recent med list.                   Brand Name Dispense Instructions for  use Diagnosis    ASPIRIN EC PO      Take 81 mg by mouth daily        ATORVASTATIN CALCIUM PO      Take 10 mg by mouth daily        fluticasone 50 MCG/ACT spray    FLONASE    1 Bottle    Spray 1-2 sprays into both nostrils daily    Acute seasonal allergic rhinitis, unspecified trigger       hydrochlorothiazide 25 MG tablet    HYDRODIURIL    90 tablet    TAKE 1 TABLET (25 MG) BY MOUTH DAILY    Benign essential hypertension       LEVOTHROID PO      Take 175 mcg by mouth daily        multivitamin, therapeutic with minerals Tabs tablet      Take 1 tablet by mouth daily Has been using Emergen C vitamins        predniSONE 20 MG tablet    DELTASONE    10 tablet    Take 2 tablets (40 mg) by mouth daily for 5 days    Cough       trimethoprim-polymyxin b ophthalmic solution    POLYTRIM    2 mL    Apply 1 drop to eye 4 times daily for 7 days    Acute conjunctivitis of left eye, unspecified acute conjunctivitis type       VITAMIN D3 PO      Take 5,000 Units by mouth daily

## 2018-04-02 ENCOUNTER — OFFICE VISIT (OUTPATIENT)
Dept: FAMILY MEDICINE | Facility: CLINIC | Age: 53
End: 2018-04-02
Payer: COMMERCIAL

## 2018-04-02 VITALS
OXYGEN SATURATION: 97 % | WEIGHT: 203 LBS | BODY MASS INDEX: 37.36 KG/M2 | TEMPERATURE: 98.2 F | HEIGHT: 62 IN | HEART RATE: 90 BPM | SYSTOLIC BLOOD PRESSURE: 130 MMHG | DIASTOLIC BLOOD PRESSURE: 93 MMHG

## 2018-04-02 DIAGNOSIS — E03.9 HYPOTHYROIDISM, UNSPECIFIED TYPE: ICD-10-CM

## 2018-04-02 DIAGNOSIS — J98.9 REACTIVE AIRWAY DISEASE THAT IS NOT ASTHMA: ICD-10-CM

## 2018-04-02 DIAGNOSIS — E78.5 HYPERLIPIDEMIA, UNSPECIFIED HYPERLIPIDEMIA TYPE: ICD-10-CM

## 2018-04-02 DIAGNOSIS — Z00.00 ROUTINE HISTORY AND PHYSICAL EXAMINATION OF ADULT: Primary | ICD-10-CM

## 2018-04-02 DIAGNOSIS — Z11.59 NEED FOR HEPATITIS C SCREENING TEST: ICD-10-CM

## 2018-04-02 DIAGNOSIS — I10 ESSENTIAL HYPERTENSION: ICD-10-CM

## 2018-04-02 DIAGNOSIS — Z12.11 COLON CANCER SCREENING: ICD-10-CM

## 2018-04-02 DIAGNOSIS — R10.9 RIGHT FLANK PAIN: ICD-10-CM

## 2018-04-02 DIAGNOSIS — G47.30 SLEEP APNEA, UNSPECIFIED TYPE: ICD-10-CM

## 2018-04-02 LAB
ALBUMIN UR-MCNC: NEGATIVE MG/DL
ANION GAP SERPL CALCULATED.3IONS-SCNC: 8 MMOL/L (ref 3–14)
APPEARANCE UR: CLEAR
BACTERIA #/AREA URNS HPF: ABNORMAL /HPF
BILIRUB UR QL STRIP: NEGATIVE
BUN SERPL-MCNC: 14 MG/DL (ref 7–30)
CALCIUM SERPL-MCNC: 8.8 MG/DL (ref 8.5–10.1)
CHLORIDE SERPL-SCNC: 105 MMOL/L (ref 94–109)
CHOLEST SERPL-MCNC: 196 MG/DL
CO2 SERPL-SCNC: 26 MMOL/L (ref 20–32)
COLOR UR AUTO: YELLOW
CREAT SERPL-MCNC: 0.65 MG/DL (ref 0.52–1.04)
GFR SERPL CREATININE-BSD FRML MDRD: >90 ML/MIN/1.7M2
GLUCOSE SERPL-MCNC: 105 MG/DL (ref 70–99)
GLUCOSE UR STRIP-MCNC: NEGATIVE MG/DL
HCV AB SERPL QL IA: NONREACTIVE
HDLC SERPL-MCNC: 38 MG/DL
HGB UR QL STRIP: ABNORMAL
KETONES UR STRIP-MCNC: NEGATIVE MG/DL
LDLC SERPL CALC-MCNC: 119 MG/DL
LEUKOCYTE ESTERASE UR QL STRIP: NEGATIVE
NITRATE UR QL: NEGATIVE
NON-SQ EPI CELLS #/AREA URNS LPF: ABNORMAL /LPF
NONHDLC SERPL-MCNC: 158 MG/DL
PH UR STRIP: 5.5 PH (ref 5–7)
POTASSIUM SERPL-SCNC: 3.2 MMOL/L (ref 3.4–5.3)
RBC #/AREA URNS AUTO: ABNORMAL /HPF
SODIUM SERPL-SCNC: 139 MMOL/L (ref 133–144)
SOURCE: ABNORMAL
SP GR UR STRIP: 1.02 (ref 1–1.03)
TRIGL SERPL-MCNC: 194 MG/DL
TSH SERPL DL<=0.005 MIU/L-ACNC: 2.05 MU/L (ref 0.4–4)
UROBILINOGEN UR STRIP-ACNC: 0.2 EU/DL (ref 0.2–1)
WBC #/AREA URNS AUTO: ABNORMAL /HPF

## 2018-04-02 PROCEDURE — 81001 URINALYSIS AUTO W/SCOPE: CPT | Performed by: INTERNAL MEDICINE

## 2018-04-02 PROCEDURE — 86803 HEPATITIS C AB TEST: CPT | Performed by: INTERNAL MEDICINE

## 2018-04-02 PROCEDURE — 84443 ASSAY THYROID STIM HORMONE: CPT | Performed by: INTERNAL MEDICINE

## 2018-04-02 PROCEDURE — 80048 BASIC METABOLIC PNL TOTAL CA: CPT | Performed by: INTERNAL MEDICINE

## 2018-04-02 PROCEDURE — 36415 COLL VENOUS BLD VENIPUNCTURE: CPT | Performed by: INTERNAL MEDICINE

## 2018-04-02 PROCEDURE — 80061 LIPID PANEL: CPT | Performed by: INTERNAL MEDICINE

## 2018-04-02 PROCEDURE — 99396 PREV VISIT EST AGE 40-64: CPT | Performed by: INTERNAL MEDICINE

## 2018-04-02 RX ORDER — MONTELUKAST SODIUM 10 MG/1
10 TABLET ORAL AT BEDTIME
Qty: 30 TABLET | Refills: 1 | Status: SHIPPED | OUTPATIENT
Start: 2018-04-02 | End: 2018-05-14

## 2018-04-02 RX ORDER — TRIAMTERENE AND HYDROCHLOROTHIAZIDE 37.5; 25 MG/1; MG/1
1 CAPSULE ORAL DAILY
Qty: 30 CAPSULE | Refills: 1 | Status: SHIPPED | OUTPATIENT
Start: 2018-04-02 | End: 2018-05-07

## 2018-04-02 NOTE — NURSING NOTE
"Chief Complaint   Patient presents with     Physical       Initial BP (!) 130/93  Pulse 90  Temp 98.2  F (36.8  C) (Oral)  Ht 5' 2\" (1.575 m)  Wt 203 lb (92.1 kg)  SpO2 97%  Breastfeeding? No  BMI 37.13 kg/m2 Estimated body mass index is 37.13 kg/(m^2) as calculated from the following:    Height as of this encounter: 5' 2\" (1.575 m).    Weight as of this encounter: 203 lb (92.1 kg).  Medication Reconciliation: complete   Starr Lewis CMA      "

## 2018-04-02 NOTE — MR AVS SNAPSHOT
After Visit Summary   4/2/2018    Susi Rossi    MRN: [id number]           Patient Information     Date Of Birth          1965        Visit Information        Provider Department      4/2/2018 9:30 AM Zeke Todd MD [facility]        Today's Diagnoses     Routine history and physical examination of adult    -  1    Essential hypertension        Hypothyroidism, unspecified type        Hyperlipidemia, unspecified hyperlipidemia type        Reactive airway disease that is not asthma        Sleep apnea, unspecified type        Right flank pain        Colon cancer screening        Need for hepatitis C screening test          Care Instructions    Proceed with sleep study and colonoscopy.    Follow up in 1 month.      Preventive Health Recommendations  Female Ages 50 - 64    Yearly exam: See your health care provider every year in order to  o Review health changes.   o Discuss preventive care.    o Review your medicines if your doctor has prescribed any.      Get a Pap test every three years (unless you have an abnormal result and your provider advises testing more often).    If you get Pap tests with HPV test, you only need to test every 5 years, unless you have an abnormal result.     You do not need a Pap test if your uterus was removed (hysterectomy) and you have not had cancer.    You should be tested each year for STDs (sexually transmitted diseases) if you're at risk.     Have a mammogram every 1 to 2 years.    Have a colonoscopy at age 50, or have a yearly FIT test (stool test). These exams screen for colon cancer.      Have a cholesterol test every 5 years, or more often if advised.    Have a diabetes test (fasting glucose) every three years. If you are at risk for diabetes, you should have this test more often.     If you are at risk for osteoporosis (brittle bone disease), think about having a bone density scan (DEXA).    Shots: Get a flu shot  each year. Get a tetanus shot every 10 years.    Nutrition:     Eat at least 5 servings of fruits and vegetables each day.    Eat whole-grain bread, whole-wheat pasta and brown rice instead of white grains and rice.    Talk to your provider about Calcium and Vitamin D.     Lifestyle    Exercise at least 150 minutes a week (30 minutes a day, 5 days a week). This will help you control your weight and prevent disease.    Limit alcohol to one drink per day.    No smoking.     Wear sunscreen to prevent skin cancer.     See your dentist every six months for an exam and cleaning.    See your eye doctor every 1 to 2 years.            Follow-ups after your visit        Additional Services     GASTROENTEROLOGY ADULT REF PROCEDURE ONLY       Last Lab Result: Creatinine (mg/dL)       Date                     Value                 02/19/2016               48               ----------  Body mass index is 37.13 kg/(m^2).     Needed:  No  Language:  English    Patient will be contacted to schedule procedure.     Please be aware that coverage of these services is subject to the terms and limitations of your health insurance plan.  Call member services at your health plan with any benefit or coverage questions.  Any procedures must be performed at a [facility] facility OR coordinated by your clinic's referral office.    Please bring the following with you to your appointment:    (1) Any X-Rays, CTs or MRIs which have been performed.  Contact the facility where they were done to arrange for  prior to your scheduled appointment.    (2) List of current medications   (3) This referral request   (4) Any documents/labs given to you for this referral            SLEEP EVALUATION & MANAGEMENT REFERRAL - ADULT -[facility] [phone]  (Age 18 and up)       Please be aware that coverage of these services is subject to the terms and limitations of your health insurance plan.  Call member services at your  "health plan with any benefit or coverage questions.      Please bring the following to your appointment:    >>   List of current medications   >>   This referral request   >>   Any documents/labs given to you for this referral                      Future tests that were ordered for you today     Open Future Orders        Priority Expected Expires Ordered    SLEEP EVALUATION & MANAGEMENT REFERRAL - ADULT -[facility] [place] [phone]  (Age 18 and up) Routine  2019            Who to contact     If you have questions or need follow up information about today's clinic visit or your schedule please contact [facility] [place] directly at [phone].  Normal or non-critical lab and imaging results will be communicated to you by MyChart, letter or phone within 4 business days after the clinic has received the results. If you do not hear from us within 7 days, please contact the clinic through [facility] or phone. If you have a critical or abnormal lab result, we will notify you by phone as soon as possible.  Submit refill requests through [employer] or call your pharmacy and they will forward the refill request to us. Please allow 3 business days for your refill to be completed.          Additional Information About Your Visit        [employer] Information     [employer] lets you send messages to your doctor, view your test results, renew your prescriptions, schedule appointments and more. To sign up, go to [email or web address] . Click on \"Log in\" on the left side of the screen, which will take you to the Welcome page. Then click on \"Sign up Now\" on the right side of the page.     You will be asked to enter the access code listed below, as well as some personal information. Please follow the directions to create your username and password.     Your access code is: [email or web address]  Expires: 2018 10:09 AM     Your access code will  in 90 days. If you need help or a new code, please call " "your [facility] clinic or [phone].        Care EveryWhere ID     This is your Care EveryWhere ID. This could be used by other organizations to access your [facility] medical records  [id number]        Your Vitals Were     Pulse Temperature Height Pulse Oximetry Breastfeeding? BMI (Body Mass Index)    90 98.2  F (36.8  C) (Oral) 5' 2\" (1.575 m) 97% No 37.13 kg/m2       Blood Pressure from Last 3 Encounters:   04/02/18 (!) 130/93   01/24/18 (!) 127/92   07/25/17 128/89    Weight from Last 3 Encounters:   04/02/18 203 lb (92.1 kg)   01/24/18 205 lb (93 kg)   03/23/17 192 lb 9.6 oz (87.4 kg)              We Performed the Following     Basic metabolic panel     GASTROENTEROLOGY ADULT REF PROCEDURE ONLY     Hepatitis C antibody     Lipid panel reflex to direct LDL Fasting     TSH with free T4 reflex     UA reflex to Microscopic and Culture          Today's Medication Changes          These changes are accurate as of 4/2/18 10:20 AM.  If you have any questions, ask your nurse or doctor.               Start taking these medicines.        Dose/Directions    montelukast 10 MG tablet   Commonly known as:  SINGULAIR   Used for:  Reactive airway disease that is not asthma   Started by:  Zeke Todd MD        Dose:  10 mg   Take 1 tablet (10 mg) by mouth At Bedtime   Quantity:  30 tablet   Refills:  1       triamterene-hydrochlorothiazide 37.5-25 MG per capsule   Commonly known as:  DYAZIDE   Used for:  Essential hypertension   Started by:  Zeke Todd MD        Dose:  1 capsule   Take 1 capsule by mouth daily   Quantity:  30 capsule   Refills:  1         Stop taking these medicines if you haven't already. Please contact your care team if you have questions.     hydrochlorothiazide 25 MG tablet   Commonly known as:  HYDRODIURIL   Stopped by:  Zeke Todd MD                Where to get your medicines      These medications were sent to [facility]/pharm[facility] #[id number] - [place], " [place] - [address]  [address] [address]     Phone:  [phone]     montelukast 10 MG tablet    triamterene-hydrochlorothiazide 37.5-25 MG per capsule                Primary Care Provider Office Phone # Fax #    Zeke Darwin Todd [phone] [address]  [facility] [address]        Equal Access to Services     SUN MERIDA : Hadii aad ku hadasho Soomaali, waaxda luqadaha, qaybta kaalmada adeegyada, waxay idiin hayaan adeeg kharash la'aan ah. [facility] [phone].    ATENCIÓN: Si josey grewal, tiene a rendon disposición servicios gratuitos de asistencia lingüística. [facility] [phone].    We comply with applicable federal civil rights laws and [place] laws. We do not discriminate on the basis of race, color, national origin, age, disability, sex, sexual orientation, or gender identity.            Thank you!     Thank you for choosing [facility]  for your care. Our goal is always to provide you with excellent care. Hearing back from our patients is one way we can continue to improve our services. Please take a few minutes to complete the written survey that you may receive in the mail after your visit with us. Thank you!             Your Updated Medication List - Protect others around you: Learn how to safely use, store and throw away your medicines at www.disposemymeds.org.          This list is accurate as of 4/2/18 10:20 AM.  Always use your most recent med list.                   Brand Name Dispense Instructions for use Diagnosis    ASPIRIN EC PO      Take 81 mg by mouth daily        ATORVASTATIN CALCIUM PO      Take 10 mg by mouth daily        fluticasone 50 MCG/ACT spray    FLONASE    1 Bottle    Spray 1-2 sprays into both nostrils daily    Acute seasonal allergic rhinitis, unspecified trigger       LEVOTHROID PO      Take 175 mcg by mouth daily        montelukast 10 MG tablet    SINGULAIR    30 tablet    Take 1 tablet (10 mg) by mouth  At Bedtime    Reactive airway disease that is not asthma       multivitamin, therapeutic with minerals Tabs tablet      Take 1 tablet by mouth daily Has been using Emergen C vitamins        triamterene-hydrochlorothiazide 37.5-25 MG per capsule    DYAZIDE    30 capsule    Take 1 capsule by mouth daily    Essential hypertension       VITAMIN D3 PO      Take 5,000 Units by mouth daily

## 2018-04-02 NOTE — PATIENT INSTRUCTIONS
Proceed with sleep study and colonoscopy.    Follow up in 1 month.      Preventive Health Recommendations  Female Ages 50 - 64    Yearly exam: See your health care provider every year in order to  o Review health changes.   o Discuss preventive care.    o Review your medicines if your doctor has prescribed any.      Get a Pap test every three years (unless you have an abnormal result and your provider advises testing more often).    If you get Pap tests with HPV test, you only need to test every 5 years, unless you have an abnormal result.     You do not need a Pap test if your uterus was removed (hysterectomy) and you have not had cancer.    You should be tested each year for STDs (sexually transmitted diseases) if you're at risk.     Have a mammogram every 1 to 2 years.    Have a colonoscopy at age 50, or have a yearly FIT test (stool test). These exams screen for colon cancer.      Have a cholesterol test every 5 years, or more often if advised.    Have a diabetes test (fasting glucose) every three years. If you are at risk for diabetes, you should have this test more often.     If you are at risk for osteoporosis (brittle bone disease), think about having a bone density scan (DEXA).    Shots: Get a flu shot each year. Get a tetanus shot every 10 years.    Nutrition:     Eat at least 5 servings of fruits and vegetables each day.    Eat whole-grain bread, whole-wheat pasta and brown rice instead of white grains and rice.    Talk to your provider about Calcium and Vitamin D.     Lifestyle    Exercise at least 150 minutes a week (30 minutes a day, 5 days a week). This will help you control your weight and prevent disease.    Limit alcohol to one drink per day.    No smoking.     Wear sunscreen to prevent skin cancer.     See your dentist every six months for an exam and cleaning.    See your eye doctor every 1 to 2 years.

## 2018-04-02 NOTE — PROGRESS NOTES
"   SUBJECTIVE:   CC: Susi Rossi is an 52 year old woman who presents for preventive health visit.     Patient has several other concerns that she wants to discuss in addition to her physical examination.    She is concerned that her blood pressure continues to be elevated. She is on hydrochlorothiazide 25 mg daily. She is tolerating the medication well.    She complains of persistent episodic cough associated with nasal congestion and postnasal drip. Denies shortness of breath. Sputum is clear and no fever/chills. No weight loss. No acid reflux.    Was reportedly observed by her  that she was \"choking\" during her sleep. She is concerned about sleep apnea and wants to be tested. She does experience excessive daytime fatigue.    Complaints of pain at the right flank area which appears to coincide with pain at the right costovertebral area and right subcostal area. No urinary symptoms. No rash. No clear relation to movement/position.      Healthy Habits:    Do you get at least three servings of calcium containing foods daily (dairy, green leafy vegetables, etc.)? yes    Amount of exercise or daily activities, outside of work: 5 day(s) per week    Problems taking medications regularly No    Medication side effects: No    Have you had an eye exam in the past two years? no    Do you see a dentist twice per year? yes    Do you have sleep apnea, excessive snoring or daytime drowsiness?yes      Today's PHQ-2 Score:   PHQ-2 ( 1999 Pf[employer]) 4/2/2018 12/16/2016   Q1: Little interest or pleasure in doing things 0 0   Q2: Feeling down, depressed or hopeless 0 0   PHQ-2 Score 0 0     Abuse: Current or Past(Physical, Sexual or Emotional)- No  Do you feel safe in your environment - Yes    If you drink alcohol do you typically have >3 drinks per day or >7 drinks per week? No                     Reviewed orders with patient.  Reviewed health maintenance and updated orders accordingly - Yes      Patient over age " 50, mutual decision to screen reflected in health maintenance.    Pertinent mammograms are reviewed under the imaging tab.  History of abnormal Pap smear: NO - age 30- 65 PAP every 3 years recommended    Reviewed and updated as needed this visit by clinical staff  Tobacco  Allergies  Meds  Problems  Med Hx  Surg Hx  Fam Hx  Soc Hx        Reviewed and updated as needed this visit by Provider  Allergies  Meds  Problems        Past Medical History:   Diagnosis Date     Benign essential hypertension      Cholecystitis with cholelithiasis 2014     Gallstones      Hyperlipidemia      Thyroid disease        Past Surgical History:   Procedure Laterality Date     LAPAROSCOPIC CHOLECYSTECTOMY  2014    Procedure: LAPAROSCOPIC CHOLECYSTECTOMY;  Surgeon: Chay Shafer MD;  Location:  OR       Family History   Problem Relation Age of Onset     Hyperlipidemia Father      Family History Negative Mother       MVA        Social History   Substance Use Topics     Smoking status: Never Smoker     Smokeless tobacco: Never Used     Alcohol use 0.0 oz/week     0 Standard drinks or equivalent per week       ROS:  Review of Systems   Constitutional: Positive for fatigue. Negative for chills, fever and unexpected weight change.   HENT: Positive for congestion and postnasal drip. Negative for ear pain, hearing loss, sinus pain, sinus pressure, sore throat and trouble swallowing.    Eyes: Negative for pain and visual disturbance.   Respiratory: Positive for cough. Negative for shortness of breath.    Cardiovascular: Negative for chest pain and palpitations.   Gastrointestinal: Negative for abdominal pain, blood in stool, constipation, diarrhea, nausea and vomiting.   Genitourinary: Negative for difficulty urinating.   Musculoskeletal: Negative for arthralgias, back pain and myalgias.   Skin: Negative for rash.   Neurological: Negative for dizziness, syncope, weakness, light-headedness and numbness.  "  Psychiatric/Behavioral: Negative for behavioral problems, hallucinations and sleep disturbance. The patient is not nervous/anxious.        OBJECTIVE:   BP (!) 130/93  Pulse 90  Temp 98.2  F (36.8  C) (Oral)  Ht 5' 2\" (1.575 m)  Wt 203 lb (92.1 kg)  SpO2 97%  Breastfeeding? No  BMI 37.13 kg/m2  EXAM:  Physical Exam   Constitutional: She is oriented to person, place, and time. No distress.   HENT:   Right Ear: External ear normal.   Left Ear: External ear normal.   Nose: Nose normal.   Mouth/Throat: Oropharynx is clear and moist.   Eyes: Conjunctivae are normal. Pupils are equal, round, and reactive to light.   Neck: Normal range of motion. Neck supple. No thyromegaly present.   Cardiovascular: Normal rate, regular rhythm and normal heart sounds.    Pulmonary/Chest: Effort normal and breath sounds normal. No respiratory distress.   Abdominal: Soft. There is no tenderness.   Musculoskeletal: Normal range of motion. She exhibits no edema or tenderness.   Lymphadenopathy:     She has no cervical adenopathy.   Neurological: She is alert and oriented to person, place, and time. She has normal reflexes. Coordination normal.   Skin: No rash noted.   Psychiatric: She has a normal mood and affect. Judgment normal.   Vitals reviewed.      ASSESSMENT/PLAN:       ICD-10-CM    1. Routine history and physical examination of adult Z00.00 Urine Microscopic   2. Essential hypertension I10 Basic metabolic panel     triamterene-hydrochlorothiazide (DYAZIDE) 37.5-25 MG per capsule   3. Hypothyroidism, unspecified type E03.9 TSH with free T4 reflex   4. Hyperlipidemia, unspecified hyperlipidemia type E78.5 Lipid panel reflex to direct LDL Fasting   5. Reactive airway disease that is not asthma R09.89 montelukast (SINGULAIR) 10 MG tablet   6. Sleep apnea, unspecified type G47.30 SLEEP EVALUATION & MANAGEMENT REFERRAL - [facility] [phone]  (Age 18 and up)   7. Right flank pain R10.9 UA reflex to " Microscopic and Culture   8. Colon cancer screening Z12.11 GASTROENTEROLOGY ADULT REF PROCEDURE ONLY   9. Need for hepatitis C screening test Z11.59 Hepatitis C antibody         Patient Instructions   Proceed with sleep study and colonoscopy.    Follow up in 1 month.      Preventive Health Recommendations  Female Ages 50 - 64    Yearly exam: See your health care provider every year in order to  o Review health changes.   o Discuss preventive care.    o Review your medicines if your doctor has prescribed any.      Get a Pap test every three years (unless you have an abnormal result and your provider advises testing more often).    If you get Pap tests with HPV test, you only need to test every 5 years, unless you have an abnormal result.     You do not need a Pap test if your uterus was removed (hysterectomy) and you have not had cancer.    You should be tested each year for STDs (sexually transmitted diseases) if you're at risk.     Have a mammogram every 1 to 2 years.    Have a colonoscopy at age 50, or have a yearly FIT test (stool test). These exams screen for colon cancer.      Have a cholesterol test every 5 years, or more often if advised.    Have a diabetes test (fasting glucose) every three years. If you are at risk for diabetes, you should have this test more often.     If you are at risk for osteoporosis (brittle bone disease), think about having a bone density scan (DEXA).    Shots: Get a flu shot each year. Get a tetanus shot every 10 years.    Nutrition:     Eat at least 5 servings of fruits and vegetables each day.    Eat whole-grain bread, whole-wheat pasta and brown rice instead of white grains and rice.    Talk to your provider about Calcium and Vitamin D.     Lifestyle    Exercise at least 150 minutes a week (30 minutes a day, 5 days a week). This will help you control your weight and prevent disease.    Limit alcohol to one drink per day.    No smoking.     Wear sunscreen to prevent skin cancer.  "    See your dentist every six months for an exam and cleaning.    See your eye doctor every 1 to 2 years.        COUNSELING:   Reviewed preventive health counseling, as reflected in patient instructions     reports that she has never smoked. She has never used smokeless tobacco.    Estimated body mass index is 37.13 kg/(m^2) as calculated from the following:    Height as of this encounter: 5' 2\" (1.575 m).    Weight as of this encounter: 203 lb (92.1 kg).   Weight management plan: Patient will follow-up to discuss weight management    Counseling Resources:  ATP IV Guidelines  Pooled Cohorts Equation Calculator  Breast Cancer Risk Calculator  FRAX Risk Assessment  ICSI Preventive Guidelines  Dietary Guidelines for Americans, 2010  USDA's MyPlate  ASA Prophylaxis  Lung CA Screening    Zeke Todd MD  [facility]    "

## 2018-04-03 ASSESSMENT — ENCOUNTER SYMPTOMS
LIGHT-HEADEDNESS: 0
WEAKNESS: 0
NUMBNESS: 0
DIARRHEA: 0
UNEXPECTED WEIGHT CHANGE: 0
ABDOMINAL PAIN: 0
SINUS PAIN: 0
EYE PAIN: 0
BACK PAIN: 0
SORE THROAT: 0
BLOOD IN STOOL: 0
ARTHRALGIAS: 0
TROUBLE SWALLOWING: 0
CONSTIPATION: 0
FEVER: 0
VOMITING: 0
SLEEP DISTURBANCE: 0
SHORTNESS OF BREATH: 0
PALPITATIONS: 0
NERVOUS/ANXIOUS: 0
CHILLS: 0
HALLUCINATIONS: 0
DIZZINESS: 0
MYALGIAS: 0
DIFFICULTY URINATING: 0
COUGH: 1
FATIGUE: 1
SINUS PRESSURE: 0
NAUSEA: 0

## 2018-04-11 ENCOUNTER — OFFICE VISIT (OUTPATIENT)
Dept: SLEEP MEDICINE | Facility: CLINIC | Age: 53
End: 2018-04-11
Attending: INTERNAL MEDICINE
Payer: COMMERCIAL

## 2018-04-11 VITALS
HEIGHT: 62 IN | WEIGHT: 204 LBS | BODY MASS INDEX: 37.54 KG/M2 | RESPIRATION RATE: 16 BRPM | SYSTOLIC BLOOD PRESSURE: 119 MMHG | DIASTOLIC BLOOD PRESSURE: 87 MMHG | OXYGEN SATURATION: 91 % | HEART RATE: 78 BPM

## 2018-04-11 DIAGNOSIS — R06.81 WITNESSED EPISODE OF APNEA: ICD-10-CM

## 2018-04-11 DIAGNOSIS — R06.83 SNORING: ICD-10-CM

## 2018-04-11 DIAGNOSIS — R29.818 SUSPECTED SLEEP APNEA: Primary | ICD-10-CM

## 2018-04-11 DIAGNOSIS — R53.83 FATIGUE, UNSPECIFIED TYPE: ICD-10-CM

## 2018-04-11 DIAGNOSIS — G47.30 SLEEP APNEA, UNSPECIFIED TYPE: ICD-10-CM

## 2018-04-11 PROCEDURE — 99204 OFFICE O/P NEW MOD 45 MIN: CPT | Performed by: INTERNAL MEDICINE

## 2018-04-11 NOTE — NURSING NOTE
"Chief Complaint   Patient presents with     Sleep Problem     \"I believe I have sleep apnea.\"       Initial /87  Pulse 78  Resp 16  Ht 1.575 m (5' 2\")  Wt 92.5 kg (204 lb)  SpO2 91%  BMI 37.31 kg/m2 Estimated body mass index is 37.31 kg/(m^2) as calculated from the following:    Height as of this encounter: 1.575 m (5' 2\").    Weight as of this encounter: 92.5 kg (204 lb).  Medication Reconciliation: complete     ESS 8  Neck 41cm  Quin Bowman    "

## 2018-04-11 NOTE — PROGRESS NOTES
Sleep Consultation:    Date on this visit: 4/11/2018    Susi Rossi is sent by Zeke Palomino* for a sleep consultation regarding sleep apnea.    Primary Physician: Zeke Todd     Chief complaint: snoring, witnessed apneas, tiredness     Presenting History:     Susi Rossi reports nightly snoring, gasping and poor quality of sleep for last 5 years.     Susi does snore every night. Patient does have a regular bed partner. There is report of snoring, gasping, snorting and poor quality of sleep.  She does have witnessed apneas. They occasionally sleep separately.  Patient sleeps on her back, side and stomach. She has occasional morning headaches and frequent morning dry mouth, denies no restless legs.     Susi denies any bruxism, sleep walking, sleep talking, dream enactment, sleep paralysis, cataplexy and hypnogogic/hypnopompic hallucinations.    Susi goes to sleep at 10:30 - 11:30 PM during the week. She wakes up at 7:00 AM without an alarm. She falls asleep in 10 minutes.  Susi denies difficulty falling asleep.  She wakes up 0-1 times a night for 5 minutes before falling back to sleep.  Susi wakes up to uncertain reasons.  On weekends, Susi goes to sleep at 12:00 AM.  She wakes up at 9:00 AM without an alarm. She falls asleep in 10 minutes.  Patient gets an average of 6-7 hours of sleep per night.     Patient does watch TV in bed.     Susi does not do shift work.  She works day shifts.      She denies sleep walking and sleep talking as a child.  Susi has difficulty breathing through her nose.      Patient's [place] Sleepiness score 8/24 consistent with no daytime sleepiness.      Susi naps 1-2 times per week for  minutes, feels refreshed after naps. She takes some inadvertant naps in the evening when watching TV.  She denies closing eyes, dozing and falling asleep while driving. Patient was counseled on the importance of driving while  alert, to pull over if drowsy, or nap before getting into the vehicle if sleepy.      She uses 2 cups/day of coffee, 1-2 sodas/day. Last caffeine intake is usually before 2 pm.    Allergies:    Allergies   Allergen Reactions     Penicillins Other (See Comments)     Hives, shortness of breath         Medications:    Current Outpatient Prescriptions   Medication Sig Dispense Refill     triamterene-hydrochlorothiazide (DYAZIDE) 37.5-25 MG per capsule Take 1 capsule by mouth daily 30 capsule 1     ATORVASTATIN CALCIUM PO Take 10 mg by mouth daily       Levothyroxine Sodium (LEVOTHROID PO) Take 175 mcg by mouth daily        montelukast (SINGULAIR) 10 MG tablet Take 1 tablet (10 mg) by mouth At Bedtime (Patient not taking: Reported on 4/11/2018) 30 tablet 1     fluticasone (FLONASE) 50 MCG/ACT spray Spray 1-2 sprays into both nostrils daily (Patient not taking: Reported on 4/11/2018) 1 Bottle 0     Cholecalciferol (VITAMIN D3 PO) Take 5,000 Units by mouth daily       ASPIRIN EC PO Take 81 mg by mouth daily       multivitamin, therapeutic with minerals (THERA-VIT-M) TABS Take 1 tablet by mouth daily Has been using Emergen C vitamins         Problem List:  Patient Active Problem List    Diagnosis Date Noted     Hypothyroidism, unspecified type 04/02/2018     Priority: Medium     Essential hypertension 04/02/2018     Priority: Medium     Hyperlipidemia, unspecified hyperlipidemia type 04/02/2018     Priority: Medium        Past Medical/Surgical History:  Past Medical History:   Diagnosis Date     Benign essential hypertension      Cholecystitis with cholelithiasis 5/5/2014     Gallstones      Hyperlipidemia      Thyroid disease      Past Surgical History:   Procedure Laterality Date     LAPAROSCOPIC CHOLECYSTECTOMY  5/6/2014    Procedure: LAPAROSCOPIC CHOLECYSTECTOMY;  Surgeon: Chay Shafer MD;  Location:  OR       Social History:  Social History     Social History     Marital status:      Spouse name:  N/A     Number of children: N/A     Years of education: N/A     Occupational History     Not on file.     Social History Main Topics     Smoking status: Never Smoker     Smokeless tobacco: Never Used     Alcohol use 0.0 oz/week     0 Standard drinks or equivalent per week     Drug use: No     Sexual activity: Yes     Partners: Male     Other Topics Concern     Not on file     Social History Narrative       Family History:    Father has sleep apnea. bother and sister have sleep apnea symptoms.     Family History   Problem Relation Age of Onset     Hyperlipidemia Father      Family History Negative Mother       MVA        Review of Systems:  A complete review of systems reviewed by me is negative with the exeption of what has been mentioned in the history of present illness.  CONSTITUTIONAL: NEGATIVE for weight gain/loss, fever, chills, sweats or night sweats, drug allergies.  EYES: NEGATIVE for changes in vision, blind spots, double vision.  ENT: NEGATIVE for ear pain, sore throat, sinus pain, post-nasal drip, runny nose, bloody nose  CARDIAC: NEGATIVE for fast heartbeats or fluttering in chest, chest pain or pressure, breathlessness when lying flat, swollen legs or swollen feet.  NEUROLOGIC: NEGATIVE headaches, weakness or numbness in the arms or legs.  DERMATOLOGIC: NEGATIVE for rashes, new moles or change in mole(s)  PULMONARY:  POSITIVE for  dry cough  GASTROINTESTINAL: NEGATIVE for nausea or vomitting, loose or watery stools, fat or grease in stools, constipation, abdominal pain, bowel movements black in color or blood noted.  GENITOURINARY: NEGATIVE for pain during urination, blood in urine, urinating more frequently than usual, irregular menstrual periods.  MUSCULOSKELETAL: NEGATIVE for muscle pain, bone or joint pain, swollen joints.  ENDOCRINE: NEGATIVE for increased thirst or urination, diabetes.  LYMPHATIC: NEGATIVE for swollen lymph nodes, lumps or bumps in the breasts or nipple discharge.    Physical  "Examination:  Vitals: /87  Pulse 78  Resp 16  Ht 1.575 m (5' 2\")  Wt 92.5 kg (204 lb)  SpO2 91%  BMI 37.31 kg/m2  BMI= Body mass index is 37.31 kg/(m^2).         [place] Total Score 4/11/2018   Total score - [place] 8       GENERAL APPEARANCE: healthy, alert and no distress  EYES: Eyes grossly normal to inspection, PERRL and conjunctivae and sclerae normal  HENT: nose and mouth without ulcers or lesions, oropharynx crowded and tongue base enlarged  NECK: no adenopathy, no asymmetry, masses, or scars and thyroid normal to palpation  RESP: lungs clear to auscultation - no rales, rhonchi or wheezes  CV: regular rates and rhythm, normal S1 S2, no S3 or S4 and no murmur, click or rub  MS: extremities normal- no gross deformities noted  NEURO: Normal strength and tone, mentation intact and speech normal  PSYCH: mentation appears normal and affect normal/bright  Mallampati Class: IV.  Tonsillar Stage: 1  hidden by pillars.    Impression/Plan:    1. High risk for obstructive sleep apnea   2. Snoring   3. witnessed apneas  4. Fatigue   5. Hypertension     Patient is a 52 years old female, with BMI 37, neck circumference 41 cm, who presents with a history of loud snoring, witnessed apneas, non restorative sleep and daytime fatigue. Medical comorbidity includes hypertension,. Oropharynx is crowded on examination. There is a high risk for obstructive sleep apnea and an overnight sleep study is recommended. We discussed PSG and home sleep study. Considering high pre test probability for sleep apnea, we decided to proceed with home sleep apnea test. Limitations of HST were reviewed.     Plan:     1. Home sleep apnea testing      Literature provided regarding sleep apnea.      She will follow up with me in approximately two weeks after her sleep study has been competed to review the results and discuss plan of care.       Polysomnography reviewed.  Obstructive sleep apnea reviewed.  Complications of untreated sleep " apnea were reviewed.    I spent a total of 40 minutes with patient with more than 50% in counseling      Lenard Hastings     CC: Zeke Palomino*

## 2018-04-11 NOTE — MR AVS SNAPSHOT
After Visit Summary   4/11/2018    Susi Rossi    MRN: [id number]           Patient Information     Date Of Birth          1965        Visit Information        Provider Department      4/11/2018 8:30 AM Lenard Hastings MD [facility]        Today's Diagnoses     Suspected sleep apnea    -  1    Sleep apnea, unspecified type        Snoring        Witnessed episode of apnea        Fatigue, unspecified type          Care Instructions    .slee          Follow-ups after your visit        Your next 10 appointments already scheduled     Apr 17, 2018  3:00 PM CDT   HST  with BED 7  SLEEP   [facility] ([facility]    [address]  [place] [address]   [phone]            Apr 18, 2018  8:30 AM CDT   HST Drop Off with  SLEEP CENTER DME   [facility] ([facility])    [address]  [place] [address]   [phone]            Apr 30, 2018   Procedure with Sheila Cruz MD   [facility] ([facility])    [address]  [place] [address]   [phone]           [facility] [address]            May 01, 2018  4:00 PM CDT   Return Sleep Patient with Lenard Hastings MD   [facility] ([facility])    [address]  [place] [address]   [phone]            May 07, 2018  9:00 AM CDT   [facility] Todd [facility]    [address] [place] [address]   [phone]           Bring a current list of meds and any records pertaining to this visit. For Physicals, please bring immunization records and any forms needing to be filled out. Please arrive 10 minutes early to complete paperwork.              Future tests that were  "ordered for you today     Open Future Orders        Priority Expected Expires Ordered    HST-Home Sleep Apnea Test Routine  10/11/2018 4/11/2018            Who to contact     If you have questions or need follow up information about today's clinic visit or your schedule please contact [facility] directly at [phone].  Normal or non-critical lab and imaging results will be communicated to you by MyChart, letter or phone within 4 business days after the clinic has received the results. If you do not hear from us within 7 days, please contact the clinic through [employer] or phone. If you have a critical or abnormal lab result, we will notify you by phone as soon as possible.  Submit refill requests through [employer] or call your pharmacy and they will forward the refill request to us. Please allow 3 business days for your refill to be completed.          Additional Information About Your Visit        [employer] Information     [employer] gives you secure access to your electronic health record. If you see a primary care provider, you can also send messages to your care team and make appointments. If you have questions, please call your primary care clinic.  If you do not have a primary care provider, please call [phone] and they will assist you.        Care EveryWhere ID     This is your Care EveryWhere ID. This could be used by other organizations to access your [facility] medical records  [id number]        Your Vitals Were     Pulse Respirations Height Pulse Oximetry BMI (Body Mass Index)       78 16 1.575 m (5' 2\") 91% 37.31 kg/m2        Blood Pressure from Last 3 Encounters:   04/11/18 119/87   04/02/18 (!) 130/93   01/24/18 (!) 127/92    Weight from Last 3 Encounters:   04/11/18 92.5 kg (204 lb)   04/02/18 92.1 kg (203 lb)   01/24/18 93 kg (205 lb)              We Performed the Following     SLEEP EVALUATION & MANAGEMENT REFERRAL - ADULT -[facility] Ashley Claros [phone]  (Age 18 " and up)        Primary Care Provider Office Phone # Fax #    Zeke Darwin Todd [phone] [address] [address]        Equal Access to Services     SUN MERIDA : Hadii kendrick ku hadaidao Soomaali, waaxda luqadaha, qaybta kaalmada adeegyada, waxron jessicain hayaan grettacarolee shay rikki fam. So [facility] [phone].    ATENCIÓN: Si habla español, tiene a rendon disposición servicios gratuitos de asistencia lingüística. Llame al [phone].    We comply with applicable federal civil rights laws and [place] laws. We do not discriminate on the basis of race, color, national origin, age, disability, sex, sexual orientation, or gender identity.            Thank you!     Thank you for choosing [facility]  for your care. Our goal is always to provide you with excellent care. Hearing back from our patients is one way we can continue to improve our services. Please take a few minutes to complete the written survey that you may receive in the mail after your visit with us. Thank you!             Your Updated Medication List - Protect others around you: Learn how to safely use, store and throw away your medicines at www.disposemymeds.org.          This list is accurate as of 4/11/18  9:14 AM.  Always use your most recent med list.                   Brand Name Dispense Instructions for use Diagnosis    ASPIRIN EC PO      Take 81 mg by mouth daily        ATORVASTATIN CALCIUM PO      Take 10 mg by mouth daily        fluticasone 50 MCG/ACT spray    FLONASE    1 Bottle    Spray 1-2 sprays into both nostrils daily    Acute seasonal allergic rhinitis, unspecified trigger       LEVOTHROID PO      Take 175 mcg by mouth daily        montelukast 10 MG tablet    SINGULAIR    30 tablet    Take 1 tablet (10 mg) by mouth At Bedtime    Reactive airway disease that is not asthma       multivitamin, therapeutic with minerals Tabs tablet      Take 1 tablet by mouth daily Has been  using Emergen C vitamins        triamterene-hydrochlorothiazide 37.5-25 MG per capsule    DYAZIDE    30 capsule    Take 1 capsule by mouth daily    Essential hypertension       VITAMIN D3 PO      Take 5,000 Units by mouth daily

## 2018-04-11 NOTE — LETTER
4/11/2018         RE: Susi Rossi  [address] [place] [place] [address]        Dear Colleague,    Thank you for referring your patient, Susi Rossi, to the [facility]. Please see a copy of my visit note below.        Sleep Consultation:    Date on this visit: 4/11/2018    Susi Rossi is sent by Zeke Palomino* for a sleep consultation regarding sleep apnea.    Primary Physician: Zeke Todd     Chief complaint: snoring, witnessed apneas, tiredness     Presenting History:     Susi Rossi reports nightly snoring, gasping and poor quality of sleep for last 5 years.     Susi does snore every night. Patient does have a regular bed partner. There is report of snoring, gasping, snorting and poor quality of sleep.  She does have witnessed apneas. They occasionally sleep separately.  Patient sleeps on her back, side and stomach. She has occasional morning headaches and frequent morning dry mouth, denies no restless legs.     Susi denies any bruxism, sleep walking, sleep talking, dream enactment, sleep paralysis, cataplexy and hypnogogic/hypnopompic hallucinations.    Susi goes to sleep at 10:30 - 11:30 PM during the week. She wakes up at 7:00 AM without an alarm. She falls asleep in 10 minutes.  Susi denies difficulty falling asleep.  She wakes up 0-1 times a night for 5 minutes before falling back to sleep.  Susi wakes up to uncertain reasons.  On weekends, Susi goes to sleep at 12:00 AM.  She wakes up at 9:00 AM without an alarm. She falls asleep in 10 minutes.  Patient gets an average of 6-7 hours of sleep per night.     Patient does watch TV in bed.     Susi does not do shift work.  She works day shifts.      She denies sleep walking and sleep talking as a child.  Susi has difficulty breathing through her nose.      Patient's [place] Sleepiness score 8/24 consistent with no daytime sleepiness.      Susi  naps 1-2 times per week for  minutes, feels refreshed after naps. She takes some inadvertant naps in the evening when watching TV.  She denies closing eyes, dozing and falling asleep while driving. Patient was counseled on the importance of driving while alert, to pull over if drowsy, or nap before getting into the vehicle if sleepy.      She uses 2 cups/day of coffee, 1-2 sodas/day. Last caffeine intake is usually before 2 pm.    Allergies:    Allergies   Allergen Reactions     Penicillins Other (See Comments)     Hives, shortness of breath         Medications:    Current Outpatient Prescriptions   Medication Sig Dispense Refill     triamterene-hydrochlorothiazide (DYAZIDE) 37.5-25 MG per capsule Take 1 capsule by mouth daily 30 capsule 1     ATORVASTATIN CALCIUM PO Take 10 mg by mouth daily       Levothyroxine Sodium (LEVOTHROID PO) Take 175 mcg by mouth daily        montelukast (SINGULAIR) 10 MG tablet Take 1 tablet (10 mg) by mouth At Bedtime (Patient not taking: Reported on 4/11/2018) 30 tablet 1     fluticasone (FLONASE) 50 MCG/ACT spray Spray 1-2 sprays into both nostrils daily (Patient not taking: Reported on 4/11/2018) 1 Bottle 0     Cholecalciferol (VITAMIN D3 PO) Take 5,000 Units by mouth daily       ASPIRIN EC PO Take 81 mg by mouth daily       multivitamin, therapeutic with minerals (THERA-VIT-M) TABS Take 1 tablet by mouth daily Has been using Emergen C vitamins         Problem List:  Patient Active Problem List    Diagnosis Date Noted     Hypothyroidism, unspecified type 04/02/2018     Priority: Medium     Essential hypertension 04/02/2018     Priority: Medium     Hyperlipidemia, unspecified hyperlipidemia type 04/02/2018     Priority: Medium        Past Medical/Surgical History:  Past Medical History:   Diagnosis Date     Benign essential hypertension      Cholecystitis with cholelithiasis 5/5/2014     Gallstones      Hyperlipidemia      Thyroid disease      Past Surgical History:   Procedure  Laterality Date     LAPAROSCOPIC CHOLECYSTECTOMY  2014    Procedure: LAPAROSCOPIC CHOLECYSTECTOMY;  Surgeon: Chay Shafer MD;  Location:  OR       Social History:  Social History     Social History     Marital status:      Spouse name: N/A     Number of children: N/A     Years of education: N/A     Occupational History     Not on file.     Social History Main Topics     Smoking status: Never Smoker     Smokeless tobacco: Never Used     Alcohol use 0.0 oz/week     0 Standard drinks or equivalent per week     Drug use: No     Sexual activity: Yes     Partners: Male     Other Topics Concern     Not on file     Social History Narrative       Family History:    Father has sleep apnea. bother and sister have sleep apnea symptoms.     Family History   Problem Relation Age of Onset     Hyperlipidemia Father      Family History Negative Mother       MVA        Review of Systems:  A complete review of systems reviewed by me is negative with the exeption of what has been mentioned in the history of present illness.  CONSTITUTIONAL: NEGATIVE for weight gain/loss, fever, chills, sweats or night sweats, drug allergies.  EYES: NEGATIVE for changes in vision, blind spots, double vision.  ENT: NEGATIVE for ear pain, sore throat, sinus pain, post-nasal drip, runny nose, bloody nose  CARDIAC: NEGATIVE for fast heartbeats or fluttering in chest, chest pain or pressure, breathlessness when lying flat, swollen legs or swollen feet.  NEUROLOGIC: NEGATIVE headaches, weakness or numbness in the arms or legs.  DERMATOLOGIC: NEGATIVE for rashes, new moles or change in mole(s)  PULMONARY:  POSITIVE for  dry cough  GASTROINTESTINAL: NEGATIVE for nausea or vomitting, loose or watery stools, fat or grease in stools, constipation, abdominal pain, bowel movements black in color or blood noted.  GENITOURINARY: NEGATIVE for pain during urination, blood in urine, urinating more frequently than usual, irregular menstrual  "periods.  MUSCULOSKELETAL: NEGATIVE for muscle pain, bone or joint pain, swollen joints.  ENDOCRINE: NEGATIVE for increased thirst or urination, diabetes.  LYMPHATIC: NEGATIVE for swollen lymph nodes, lumps or bumps in the breasts or nipple discharge.    Physical Examination:  Vitals: /87  Pulse 78  Resp 16  Ht 1.575 m (5' 2\")  Wt 92.5 kg (204 lb)  SpO2 91%  BMI 37.31 kg/m2  BMI= Body mass index is 37.31 kg/(m^2).         [place] Total Score 4/11/2018   Total score - [place] 8       GENERAL APPEARANCE: healthy, alert and no distress  EYES: Eyes grossly normal to inspection, PERRL and conjunctivae and sclerae normal  HENT: nose and mouth without ulcers or lesions, oropharynx crowded and tongue base enlarged  NECK: no adenopathy, no asymmetry, masses, or scars and thyroid normal to palpation  RESP: lungs clear to auscultation - no rales, rhonchi or wheezes  CV: regular rates and rhythm, normal S1 S2, no S3 or S4 and no murmur, click or rub  MS: extremities normal- no gross deformities noted  NEURO: Normal strength and tone, mentation intact and speech normal  PSYCH: mentation appears normal and affect normal/bright  Mallampati Class: IV.  Tonsillar Stage: 1  hidden by pillars.    Impression/Plan:    1. High risk for obstructive sleep apnea   2. Snoring   3. witnessed apneas  4. Fatigue   5. Hypertension     Patient is a 52 years old female, with BMI 37, neck circumference 41 cm, who presents with a history of loud snoring, witnessed apneas, non restorative sleep and daytime fatigue. Medical comorbidity includes hypertension,. Oropharynx is crowded on examination. There is a high risk for obstructive sleep apnea and an overnight sleep study is recommended. We discussed PSG and home sleep study. Considering high pre test probability for sleep apnea, we decided to proceed with home sleep apnea test. Limitations of HST were reviewed.     Plan:     1. Home sleep apnea testing      Literature provided regarding " sleep apnea.      She will follow up with me in approximately two weeks after her sleep study has been competed to review the results and discuss plan of care.       Polysomnography reviewed.  Obstructive sleep apnea reviewed.  Complications of untreated sleep apnea were reviewed.    I spent a total of 40 minutes with patient with more than 50% in counseling      Lenard Hastings     CC: Zeke Palomino*          Again, thank you for allowing me to participate in the care of your patient.        Sincerely,        Lenard Hastings MD, MD

## 2018-04-17 ENCOUNTER — OFFICE VISIT (OUTPATIENT)
Dept: SLEEP MEDICINE | Facility: CLINIC | Age: 53
End: 2018-04-17
Attending: INTERNAL MEDICINE
Payer: COMMERCIAL

## 2018-04-17 DIAGNOSIS — R29.818 SUSPECTED SLEEP APNEA: ICD-10-CM

## 2018-04-17 DIAGNOSIS — R53.83 FATIGUE, UNSPECIFIED TYPE: ICD-10-CM

## 2018-04-17 DIAGNOSIS — R06.81 WITNESSED EPISODE OF APNEA: ICD-10-CM

## 2018-04-17 DIAGNOSIS — G47.33 OSA (OBSTRUCTIVE SLEEP APNEA): ICD-10-CM

## 2018-04-17 DIAGNOSIS — R06.83 SNORING: ICD-10-CM

## 2018-04-17 PROCEDURE — G0399 HOME SLEEP TEST/TYPE 3 PORTA: HCPCS | Performed by: INTERNAL MEDICINE

## 2018-04-17 NOTE — MR AVS SNAPSHOT
After Visit Summary   4/17/2018    Susi Rossi    MRN: [id number]           Patient Information     Date Of Birth          1965        Visit Information        Provider Department      4/17/2018 3:00 PM BED 7 [facility] SLEEP [facility]        Today's Diagnoses     Suspected sleep apnea        Fatigue, unspecified type        Witnessed episode of apnea        Snoring           Follow-ups after your visit        Your next 10 appointments already scheduled     Apr 30, 2018   Procedure with Sheila Cruz MD   [facility] ([facility])    [address] [address]   [phone]           [facility] is located at [address] [place]            May 01, 2018  4:00 PM CDT   Return Sleep Patient with Lenard Hastings MD   [facility] ([facility])    [address] [address] [phone]            May 07, 2018  9:00 AM CDT   [facility]    [address] [facility] [address] [phone]           Bring a current list of meds and any records pertaining to this visit. For Physicals, please bring immunization records and any forms needing to be filled out. Please arrive 10 minutes early to complete paperwork.              Who to contact     If you have questions or need follow up information about today's clinic visit or your schedule please contact [facility] directly at [phone].  Normal or non-critical lab and imaging results will be communicated to you by MyChart, letter or phone within 4 business days after the clinic has received the results. If you do not hear from us within 7 days, please contact the clinic through MyChart or phone. If you have a critical or abnormal lab result, we will notify you by phone as soon  as possible.  Submit refill requests through [employer] or call your pharmacy and they will forward the refill request to us. Please allow 3 business days for your refill to be completed.          Additional Information About Your Visit        [employer] Information     [employer] gives you secure access to your electronic health record. If you see a primary care provider, you can also send messages to your care team and make appointments. If you have questions, please call your primary care clinic.  If you do not have a primary care provider, please call [phone] and they will assist you.        Care EveryWhere ID     This is your Care EveryWhere ID. This could be used by other organizations to access your [place] medical records  [id number]         Blood Pressure from Last 3 Encounters:   04/11/18 119/87   04/02/18 (!) 130/93   01/24/18 (!) 127/92    Weight from Last 3 Encounters:   04/11/18 92.5 kg (204 lb)   04/02/18 92.1 kg (203 lb)   01/24/18 93 kg (205 lb)              We Performed the Following     HST-Home Sleep Apnea Test        Primary Care Provider Office Phone # Fax #    Zeke Darwin Todd [phone] [address] [address]        Equal Access to Services     [facility]NA : Hadii aad ku hadasho Soomaali, waaxda luqadaha, qaybta kaalmada adeegyada, waxay jessicain haymattn oswaldo brandt . So [facility] [phone].    ATENCIÓN: Si habla español, tiene a rendon disposición servicios gratSembrowser Ltd.os de asistencia lingüística. Llame al [phone].    We comply with applicable federal civil rights laws and [place] laws. We do not discriminate on the basis of race, color, national origin, age, disability, sex, sexual orientation, or gender identity.            Thank you!     Thank you for choosing [place] [facility]  for your care. Our goal is always to provide you with excellent care. Hearing back from our patients is one way we can continue to improve  our services. Please take a few minutes to complete the written survey that you may receive in the mail after your visit with us. Thank you!             Your Updated Medication List - Protect others around you: Learn how to safely use, store and throw away your medicines at www.disposemymeds.org.          This list is accurate as of 4/17/18 11:59 PM.  Always use your most recent med list.                   Brand Name Dispense Instructions for use Diagnosis    ASPIRIN EC PO      Take 81 mg by mouth daily        ATORVASTATIN CALCIUM PO      Take 10 mg by mouth daily        fluticasone 50 MCG/ACT spray    FLONASE    1 Bottle    Spray 1-2 sprays into both nostrils daily    Acute seasonal allergic rhinitis, unspecified trigger       LEVOTHROID PO      Take 175 mcg by mouth daily        montelukast 10 MG tablet    SINGULAIR    30 tablet    Take 1 tablet (10 mg) by mouth At Bedtime    Reactive airway disease that is not asthma       multivitamin, therapeutic with minerals Tabs tablet      Take 1 tablet by mouth daily Has been using Emergen C vitamins        triamterene-hydrochlorothiazide 37.5-25 MG per capsule    DYAZIDE    30 capsule    Take 1 capsule by mouth daily    Essential hypertension       VITAMIN D3 PO      Take 5,000 Units by mouth daily

## 2018-04-18 ENCOUNTER — TELEPHONE (OUTPATIENT)
Dept: SLEEP MEDICINE | Facility: CLINIC | Age: 53
End: 2018-04-18

## 2018-04-18 ENCOUNTER — DOCUMENTATION ONLY (OUTPATIENT)
Dept: SLEEP MEDICINE | Facility: CLINIC | Age: 53
End: 2018-04-18
Payer: COMMERCIAL

## 2018-04-18 DIAGNOSIS — G47.33 OSA (OBSTRUCTIVE SLEEP APNEA): Primary | ICD-10-CM

## 2018-04-18 DIAGNOSIS — G47.33 OSA (OBSTRUCTIVE SLEEP APNEA): ICD-10-CM

## 2018-04-18 NOTE — PROGRESS NOTES
Patient instructed on HST use. Patient demonstrated and verbalized knowledge of use. Device programmed to start at 10:30pm. Device will be returned tomorrow before noon.    Shy Juarezham  Sleep Clinic - Specialist

## 2018-04-18 NOTE — PROCEDURES
"HOME SLEEP STUDY INTERPRETATION    Patient: Susi Rossi  MRN: [id number]  YOB: 1965  Study Date: 2018  Referring Provider: Zeke Todd;   Ordering Provider: Lenard Hastings MD, MD     Indications for Home Study: Susi Rossi is a 52 year old female with a history of hypertension who presents with symptoms suggestive of obstructive sleep apnea.    Estimated body mass index is 37.31 kg/(m^2) as calculated from the following:    Height as of 18: 1.575 m (5' 2\").    Weight as of 18: 92.5 kg (204 lb).  Total score - [place]: 8 (2018  8:31 AM)  STOP-BAN/8    Data: A full night home sleep study was performed recording the standard physiologic parameters including body position, movement, sound, nasal pressure, thermal oral airflow, chest and abdominal movements with respiratory inductance plethysmography, and oxygen saturation by pulse oximetry. Pulse rate was estimated by oximetry recording. This study was considered adequate based on > 4 hours of quality oximetry and respiratory recording. As specified by the AASM Manual for the Scoring of Sleep and Associated events, version 2.3, Rule VIII.D 1B, 4% oxygen desaturation scoring for hypopneas is used as a standard of care on all home sleep apnea testing.    Analysis Time:  354.9 minutes    Respiration:   Sleep Associated Hypoxemia: sustained hypoxemia was present. Baseline oxygen saturation was 88%.  Time with saturation less than or equal to 88% was 242.4 minutes. The lowest oxygen saturation was 51%.   Snoring: Snoring was present.  Respiratory events: The home study revealed a presence of 262 obstructive apneas and 1 mixed and central apneas. There were 111 hypopneas resulting in a combined apnea/hypopnea index [AHI] of 63.2 events per hour.  AHI was 39.2 per hour supine, - per hour prone, 80.8 per hour on left side, and 78.5 per hour on right side.   Pattern: Excluding events noted " above, respiratory rate and pattern was Normal.    Position: Percent of time spent: supine - 40%, prone - 0%, on left - 36%, on right - 23%.    Heart Rate: By pulse oximetry normal rate was noted.     Assessment:   Severe obstructive sleep apnea.  Sleep associated hypoxemia was present.    Recommendations:  Consider polysomnography with full night PAP titration.  Suggest optimizing sleep hygiene and avoiding sleep deprivation.  Weight management.    Diagnosis Code(s): Obstructive Sleep Apnea G47.33, Hypoxemia G47.36    Lenard Hastings MD, MD, April 18, 2018   Diplomate, American Board of Psychiatry and Neurology, Sleep Medicine

## 2018-04-19 NOTE — NURSING NOTE
HST drop off  Download successful. Tech notified for scoring.        Shy Pinto  Sleep Clinic - Specialist

## 2018-04-19 NOTE — TELEPHONE ENCOUNTER
Phone Sleep Study Follow-Up:    Date : 4/18/2018    Susi Yo was contacted today for follow-up of her home sleep study done on 4/17/2018 at the [facility] for possible sleep apnea.    Respiratory events: The home study revealed a presence of 262 obstructive apneas and 1 mixed and central apneas. There were 111 hypopneas resulting in a combined apnea/hypopnea index [AHI] of 63.2 events per hour.  AHI was 39.2 per hour supine, - per hour prone, 80.8 per hour on left side, and 78.5 per hour on right side.   Pattern: Excluding events noted above, respiratory rate and pattern was Normal.    Sleep Associated Hypoxemia: sustained hypoxemia was present. Baseline oxygen saturation was 88%.  Time with saturation less than or equal to 88% was 242.4 minutes. The lowest oxygen saturation was 51%.   Snoring: Snoring was present.     Position: Percent of time spent: supine - 40%, prone - 0%, on left - 36%, on right - 23%.     Heart Rate: By pulse oximetry normal rate was noted.      Assessment:   Severe obstructive sleep apnea.  Sleep associated hypoxemia was present.    These findings were reviewed with patient.     Past medical/surgical history, family history, social history, medications and allergies were reviewed.      Problem List:  Patient Active Problem List    Diagnosis Date Noted     OSEAS (obstructive sleep apnea) 04/18/2018     Priority: Medium     Severe OSEAS       Hypothyroidism, unspecified type 04/02/2018     Priority: Medium     Essential hypertension 04/02/2018     Priority: Medium     Hyperlipidemia, unspecified hyperlipidemia type 04/02/2018     Priority: Medium        Impression/Plan:    1. Severe Obstructive sleep apnea     - Patient was counseled regarding severe sleep apnea, consequences of untreated disease and management options, CPAP therapy is the treatment of choice for severe obstructive sleep apnea. Patient was willing to start CPAP.     Plan:     1. Start auto PAP  therapy 5-15 cm h2O.     She will follow up with me in about 7 week(s).     Ten minutes spent with patient, all of which were spent  counseling, consulting, coordinating plan of care.      Lenard Hastings    CC: Zeke Todd

## 2018-04-20 ENCOUNTER — DOCUMENTATION ONLY (OUTPATIENT)
Dept: SLEEP MEDICINE | Facility: CLINIC | Age: 53
End: 2018-04-20

## 2018-04-20 DIAGNOSIS — G47.33 OSA (OBSTRUCTIVE SLEEP APNEA): ICD-10-CM

## 2018-04-20 NOTE — PROGRESS NOTES
Called patient to go over her insurance information regarding the CPAP machine and supplies and to see if she would like to schedule new CPAP setup appointment with [facility]. Was unable to reach patient and left voicemail to call us back. I gave her the [facility] showroom location due to earliest available appointment. [phone]

## 2018-04-20 NOTE — Clinical Note
Lucian Hastings,  [facility] is out of network for patient's insurance. I was able to get the out of network benefit break down from insurance company. I tried calling patient to let her know the information to see if she would like to still go with [facility] for her DME CPAP needs. Patient did not answer, so I left a voicemail to call us back. Once she calls back, we will go over insurance information and schedule her CPAP setup appointment if she would like to use [facility] for her DME needs.   Thanks,  Pallavi BILLINGS

## 2018-04-23 ENCOUNTER — TELEPHONE (OUTPATIENT)
Dept: SLEEP MEDICINE | Facility: CLINIC | Age: 53
End: 2018-04-23

## 2018-04-30 ENCOUNTER — HOSPITAL ENCOUNTER (OUTPATIENT)
Facility: CLINIC | Age: 53
Discharge: HOME | End: 2018-04-30
Attending: COLON & RECTAL SURGERY | Admitting: COLON & RECTAL SURGERY
Payer: COMMERCIAL

## 2018-04-30 ENCOUNTER — SURGERY (OUTPATIENT)
Age: 53
End: 2018-04-30

## 2018-04-30 VITALS
OXYGEN SATURATION: 94 % | SYSTOLIC BLOOD PRESSURE: 121 MMHG | DIASTOLIC BLOOD PRESSURE: 94 MMHG | RESPIRATION RATE: 16 BRPM

## 2018-04-30 LAB — COLONOSCOPY: NORMAL

## 2018-04-30 PROCEDURE — 88305 TISSUE EXAM BY PATHOLOGIST: CPT | Performed by: COLON & RECTAL SURGERY

## 2018-04-30 PROCEDURE — 88305 TISSUE EXAM BY PATHOLOGIST: CPT | Mod: 26 | Performed by: COLON & RECTAL SURGERY

## 2018-04-30 PROCEDURE — 25000128 H RX IP 250 OP 636: Performed by: COLON & RECTAL SURGERY

## 2018-04-30 PROCEDURE — G0500 MOD SEDAT ENDO SERVICE >5YRS: HCPCS | Performed by: COLON & RECTAL SURGERY

## 2018-04-30 PROCEDURE — 45385 COLONOSCOPY W/LESION REMOVAL: CPT | Mod: PT | Performed by: COLON & RECTAL SURGERY

## 2018-04-30 RX ORDER — ONDANSETRON 2 MG/ML
4 INJECTION INTRAMUSCULAR; INTRAVENOUS
Status: DISCONTINUED | OUTPATIENT
Start: 2018-04-30 | End: 2018-04-30 | Stop reason: HOSPADM

## 2018-04-30 RX ORDER — LIDOCAINE 40 MG/G
CREAM TOPICAL
Status: DISCONTINUED | OUTPATIENT
Start: 2018-04-30 | End: 2018-04-30 | Stop reason: HOSPADM

## 2018-04-30 RX ORDER — FENTANYL CITRATE 50 UG/ML
INJECTION, SOLUTION INTRAMUSCULAR; INTRAVENOUS PRN
Status: DISCONTINUED | OUTPATIENT
Start: 2018-04-30 | End: 2018-04-30 | Stop reason: HOSPADM

## 2018-04-30 RX ADMIN — MIDAZOLAM 2 MG: 1 INJECTION INTRAMUSCULAR; INTRAVENOUS at 08:45

## 2018-04-30 RX ADMIN — FENTANYL CITRATE 100 MCG: 50 INJECTION, SOLUTION INTRAMUSCULAR; INTRAVENOUS at 08:45

## 2018-04-30 NOTE — H&P
Colon & Rectal Surgery History and Physical  Pre-Endoscopy Procedure Note    History of Present Illness   I have been asked by Dr. Todd to evaluate this 52 year old female for colorectal cancer screening. She currently denies any abdominal pain, weight loss, bleeding per rectum, or recent change in bowel habits.    Past Medical History  Diagnosis Date     Benign essential hypertension      Cholecystitis with cholelithiasis 5/5/2014     Gallstones      Hyperlipidemia      Thyroid disease        Past Surgical History  Past Surgical History:   Procedure Laterality Date     LAPAROSCOPIC CHOLECYSTECTOMY  5/6/2014    Procedure: LAPAROSCOPIC CHOLECYSTECTOMY;  Surgeon: Chay Shafer MD;  Location:  OR        Medications  Medication Sig     ASPIRIN EC PO Take 81 mg by mouth daily     ATORVASTATIN CALCIUM PO Take 10 mg by mouth daily     Cholecalciferol (VITAMIN D3 PO) Take 5,000 Units by mouth daily     fluticasone (FLONASE) 50 MCG/ACT spray Spray 1-2 sprays into both nostrils daily      Levothyroxine Sodium (LEVOTHROID PO) Take 175 mcg by mouth daily      montelukast (SINGULAIR) 10 MG tablet Take 1 tablet (10 mg) by mouth At Bedtime     multivitamin, therapeutic with minerals (THERA-VIT-M) TABS Take 1 tablet by mouth daily Has been using Emergen C vitamins     triamterene-hydrochlorothiazide (DYAZIDE) 37.5-25 MG per capsule Take 1 capsule by mouth daily       Allergies  Allergen Reactions     Penicillins      Hives, shortness of breath        Family History   Family history includes Hyperlipidemia in her father.     Social History   She reports that she has never smoked. She has never used smokeless tobacco. She reports that she drinks alcohol. She reports that she does not use illicit drugs.    Review of Systems   Constitutional:  No fever, weight change or fatigue.    Eyes:     No dry eyes or vision changes.   Ears/Nose/Throat/Neck:  No oral ulcers, sore throat or voice change.    Cardiovascular:   No  palpitations, syncope, angina or edema.   Respiratory:    No chest pain, excessive sleepiness, shortness of breath or hemoptysis.    Gastrointestinal:   No abdominal pain, nausea, vomiting, diarrhea or heartburn.    Genitourinary:   No dysuria, hematuria, urinary retention or urinary frequency.   Musculoskeletal:  No joint swelling or arthralgias.    Dermatologic:  No skin rash or other skin changes.   Neurologic:    No focal weakness or numbness. No neuropathy.   Psychiatric:    No depression, anxiety, suicidal ideation, or paranoid ideation.   Endocrine:   No cold or heat intolerance, polydipsia, hirsutism, change in libido, or flushing.   Hematology/Lymphatic:  No bleeding or lymphadenopathy.    Allergy/Immunology:  No rhinitis or hives.     Physical Exam   Vitals:  /84, RR 16, HR 64, SpO2 93 %, not currently breastfeeding.    General:  Alert and oriented to person, place and time   Airway: Normal oropharyngeal airway and neck mobility   Lungs:  Clear bilaterally   Heart:  Regular rate and rhythm   Abdomen: Soft, NT, ND, no masses   Rectal:  Perianal skin without excoriation, hemorrhoidal disease or anal fissure        Digital rectal examination reveals normal sphincter tone without masses    ASA Grade: II (mild systemic disease)    Impression: Cleared for use of conscious sedation for colorectal cancer screening    Plan: Proceed with colonoscopy     Sheila Cruz MD  [facility]  [phone]

## 2018-05-01 LAB — COPATH REPORT: NORMAL

## 2018-05-07 ENCOUNTER — OFFICE VISIT (OUTPATIENT)
Dept: FAMILY MEDICINE | Facility: CLINIC | Age: 53
End: 2018-05-07
Payer: COMMERCIAL

## 2018-05-07 VITALS
WEIGHT: 207.4 LBS | TEMPERATURE: 98 F | HEART RATE: 90 BPM | DIASTOLIC BLOOD PRESSURE: 89 MMHG | SYSTOLIC BLOOD PRESSURE: 116 MMHG | OXYGEN SATURATION: 93 % | BODY MASS INDEX: 37.93 KG/M2

## 2018-05-07 DIAGNOSIS — I10 ESSENTIAL HYPERTENSION: ICD-10-CM

## 2018-05-07 RX ORDER — TRIAMTERENE AND HYDROCHLOROTHIAZIDE 37.5; 25 MG/1; MG/1
1 CAPSULE ORAL DAILY
Qty: 90 CAPSULE | Refills: 1 | Status: SHIPPED | OUTPATIENT
Start: 2018-05-07 | End: 2018-11-28

## 2018-05-07 RX ORDER — LORATADINE 10 MG/1
10 TABLET ORAL DAILY
COMMUNITY
End: 2020-11-24

## 2018-05-07 NOTE — MR AVS SNAPSHOT
After Visit Summary   5/7/2018    Susi Rossi    MRN: [id number]           Patient Information     Date Of Birth          1965        Visit Information        Provider Department      5/7/2018 9:00 AM Zeke Todd MD [facility]        Today's Diagnoses     Essential hypertension          Care Instructions    Monitor your blood pressure twice a day (once you wake up and before bedtime).  Call doctor if:  -- your blood pressure for the top/upper number is greater than 140 or less than 90  -- your blood pressure for the bottom/lower number is greater than 90 or less than 60    Take Singulair as directed and call doctor if your nasal congestion and cough persist/worsens, or if you develop new symptoms or side effects from the medication.    Follow up for weight management.              Follow-ups after your visit        Who to contact     If you have questions or need follow up information about today's clinic visit or your schedule please contact [facility] directly at [phone].  Normal or non-critical lab and imaging results will be communicated to you by [employer], letter or phone within 4 business days after the clinic has received the results. If you do not hear from us within 7 days, please contact the clinic through [employer] or phone. If you have a critical or abnormal lab result, we will notify you by phone as soon as possible.  Submit refill requests through [employer] or call your pharmacy and they will forward the refill request to us. Please allow 3 business days for your refill to be completed.          Additional Information About Your Visit        [employer] Information     [employer] gives you secure access to your electronic health record. If you see a primary care provider, you can also send messages to your care team and make appointments. If you have questions, please call your primary care clinic.  If you do not have a primary  care provider, please call [phone] and they will assist you.        Care EveryWhere ID     This is your Care EveryWhere ID. This could be used by other organizations to access your [place] medical records  [id number]        Your Vitals Were     Pulse Temperature Pulse Oximetry BMI (Body Mass Index)          90 98  F (36.7  C) (Oral) 93% 37.93 kg/m2         Blood Pressure from Last 3 Encounters:   05/07/18 116/89   04/30/18 (!) 121/94   04/11/18 119/87    Weight from Last 3 Encounters:   05/07/18 207 lb 6.4 oz (94.1 kg)   04/11/18 204 lb (92.5 kg)   04/02/18 203 lb (92.1 kg)              Today, you had the following     No orders found for display         Today's Medication Changes          These changes are accurate as of 5/7/18  9:47 AM.  If you have any questions, ask your nurse or doctor.               Start taking these medicines.        Dose/Directions    Blood Pressure Monitoring Kit   Used for:  Essential hypertension   Started by:  Zeke Todd MD        Use as directed   Quantity:  1 kit   Refills:  0            Where to get your medicines      These medications were sent to [facility]/pharmacy #[id number] - [place], [place] - [address] [address]     Phone:  [phone]     triamterene-hydrochlorothiazide 37.5-25 MG per capsule         Some of these will need a paper prescription and others can be bought over the counter.  Ask your nurse if you have questions.     Bring a paper prescription for each of these medications     Blood Pressure Monitoring Kit                Primary Care Provider Office Phone # Fax #    Zeke Todd [phone] [address]  [facility] [address]        Equal Access to Services     SUN MERIDA AH: Gerald Damon, waaxda mohsen, qaybta kaalmada edelmira, sha fam. So [facility] [phone].    ATENCIÓN: Si jackla español, tiene a rendon disposición  servicios gratuitos de asistencia lingüística. Louis munguia [phone].    We comply with applicable federal civil rights laws and [place] laws. We do not discriminate on the basis of race, color, national origin, age, disability, sex, sexual orientation, or gender identity.            Thank you!     Thank you for choosing [facility]  for your care. Our goal is always to provide you with excellent care. Hearing back from our patients is one way we can continue to improve our services. Please take a few minutes to complete the written survey that you may receive in the mail after your visit with us. Thank you!             Your Updated Medication List - Protect others around you: Learn how to safely use, store and throw away your medicines at www.disposemymeds.org.          This list is accurate as of 5/7/18  9:47 AM.  Always use your most recent med list.                   Brand Name Dispense Instructions for use Diagnosis    ASPIRIN EC PO      Take 81 mg by mouth daily        ATORVASTATIN CALCIUM PO      Take 10 mg by mouth daily        Blood Pressure Monitoring Kit     1 kit    Use as directed    Essential hypertension       fluticasone 50 MCG/ACT spray    FLONASE    1 Bottle    Spray 1-2 sprays into both nostrils daily    Acute seasonal allergic rhinitis, unspecified trigger       LEVOTHROID PO      Take 175 mcg by mouth daily        loratadine 10 MG tablet    CLARITIN     Take 10 mg by mouth daily        montelukast 10 MG tablet    SINGULAIR    30 tablet    Take 1 tablet (10 mg) by mouth At Bedtime    Reactive airway disease that is not asthma       multivitamin, therapeutic with minerals Tabs tablet      Take 1 tablet by mouth daily Has been using Emergen C vitamins        triamterene-hydrochlorothiazide 37.5-25 MG per capsule    DYAZIDE    90 capsule    Take 1 capsule by mouth daily    Essential hypertension       VITAMIN D3 PO      Take 5,000 Units by mouth daily

## 2018-05-07 NOTE — PROGRESS NOTES
HPI    SUBJECTIVE:   Susi Rossi is a 52 year old female who presents to clinic today for the following health issues:      Hypertension Follow-up      Outpatient blood pressures are not being checked.    Low Salt Diet: low salt    Amount of exercise or physical activity: 3-5 days/week    Problems taking medications regularly: No    Medication side effects: none    Diet: low salt      Past Medical History:   Diagnosis Date     Benign essential hypertension      Cholecystitis with cholelithiasis 5/5/2014     Gallstones      Hyperlipidemia      Thyroid disease        Review of Systems   Constitutional: Negative for malaise/fatigue and weight loss.   Respiratory: Negative for cough and shortness of breath.    Cardiovascular: Negative for chest pain, palpitations, orthopnea, claudication, leg swelling and PND.   Gastrointestinal: Negative for abdominal pain, nausea and vomiting.   Neurological: Negative for dizziness, sensory change, focal weakness and headaches.       /89 (BP Location: Left arm, Patient Position: Sitting, Cuff Size: Adult Large)  Pulse 90  Temp 98  F (36.7  C) (Oral)  Wt 207 lb 6.4 oz (94.1 kg)  SpO2 93%  BMI 37.93 kg/m2      Physical Exam   Constitutional: She is oriented to person, place, and time. No distress.   Neck: No thyromegaly present.   Cardiovascular: Normal rate, regular rhythm and normal heart sounds.    Pulmonary/Chest: Effort normal and breath sounds normal. No respiratory distress.   Musculoskeletal: She exhibits no edema.   Neurological: She is alert and oriented to person, place, and time. Coordination normal. GCS score is 15.   Nursing note and vitals reviewed.        ICD-10-CM    1. Essential hypertension I10 triamterene-hydrochlorothiazide (DYAZIDE) 37.5-25 MG per capsule     Blood Pressure Monitoring KIT       Patient Instructions   Monitor your blood pressure twice a day (once you wake up and before bedtime).  Call doctor if:  -- your blood pressure for  the top/upper number is greater than 140 or less than 90  -- your blood pressure for the bottom/lower number is greater than 90 or less than 60    Take Singulair as directed and call doctor if your nasal congestion and cough persist/worsens, or if you develop new symptoms or side effects from the medication.    Follow up for weight management.

## 2018-05-14 DIAGNOSIS — J98.9 REACTIVE AIRWAY DISEASE THAT IS NOT ASTHMA: ICD-10-CM

## 2018-05-14 NOTE — TELEPHONE ENCOUNTER
"Last Written Prescription Date:  4/02/18  Last Fill Quantity: 30 tablet,  # refills: 1   Last office visit: No previous visit found with prescribing provider:  5/07/18 (Perez)   Future Office Visit:      Requested Prescriptions   Pending Prescriptions Disp Refills     montelukast (SINGULAIR) 10 MG tablet 30 tablet 0     Sig: Take 1 tablet (10 mg) by mouth At Bedtime - Overdue for office visit    Leukotriene Inhibitors Protocol Passed    5/14/2018  3:46 PM       Passed - Patient is age 12 or older    If patient is under 16, ok to refill using age based dosing.          Passed - Recent (12 mo) or future (30 days) visit within the authorizing provider's specialty    Patient had office visit in the last 12 months or has a visit in the next 30 days with authorizing provider or within the authorizing provider's specialty.  See \"Patient Info\" tab in inbasket, or \"Choose Columns\" in Meds & Orders section of the refill encounter.              "

## 2018-05-15 RX ORDER — MONTELUKAST SODIUM 10 MG/1
10 TABLET ORAL AT BEDTIME
Qty: 30 TABLET | Refills: 0 | Status: SHIPPED | OUTPATIENT
Start: 2018-05-15 | End: 2023-07-24

## 2018-05-15 NOTE — TELEPHONE ENCOUNTER
Prescription approved per [facility] Refill Protocol.  So Kraft RN

## 2018-05-16 ASSESSMENT — ENCOUNTER SYMPTOMS
COUGH: 0
SHORTNESS OF BREATH: 0
PALPITATIONS: 0
VOMITING: 0
DIZZINESS: 0
PND: 0
HEADACHES: 0
SENSORY CHANGE: 0
WEIGHT LOSS: 0
ABDOMINAL PAIN: 0
CLAUDICATION: 0
FOCAL WEAKNESS: 0
NAUSEA: 0
ORTHOPNEA: 0

## 2018-05-21 ENCOUNTER — DOCUMENTATION ONLY (OUTPATIENT)
Dept: SLEEP MEDICINE | Facility: CLINIC | Age: 53
End: 2018-05-21

## 2018-05-21 NOTE — Clinical Note
FYI- PT WAS SETUP AT [facility].

## 2018-05-21 NOTE — PROGRESS NOTES
PT CALLED INSURANCE AND SHE IS IN NETWORK AT THE [facility]EP [facility]. PT REQUESTED TO HAVE ALL RECORDS FAXED TO [facility] [phone]. FAXED ALL RECORDS ON 5/4/18 AT 4:35PM.

## 2018-07-01 ENCOUNTER — TRANSFERRED RECORDS (OUTPATIENT)
Dept: HEALTH INFORMATION MANAGEMENT | Facility: CLINIC | Age: 53
End: 2018-07-01

## 2018-07-01 LAB — PAP SMEAR - HIM PATIENT REPORTED: NEGATIVE

## 2018-07-17 NOTE — PROGRESS NOTES
Due to patient being non-English speaking/uses sign language, an  was used for this visit. Only for face-to-face interpretation by an external agency, date and length of -interpretation can be found on the scanned worksheet.     name: Nicolasa Pickens  Agency: [employer]  Language: [place]an   Telephone number: [phone]  Type of interpretation: Face-to-face, spoken     Petty Catherine CMA 7/17/18       HPI    2017    HPI: Susi Rossi is a 51 year old female who complains of moderate intermittent cough  & sneezing for the past month but for the past week the cough has been worse and her chest feels congested. She also notes sore/itchy/scratchy throat x 2 days. Symptoms are constant in duration. No treatments tried. Denies fever/chills, itchy/watery eyes, congestion, HA, CP, SOB, abd pain, N/V/D, rash, or any other symptoms. Patient denies sick contacts. No known hx allergies or asthma. No hx tobacco use.    Past Medical History:   Diagnosis Date     Benign essential hypertension      Gallstones      Hyperlipidemia      Thyroid disease      Past Surgical History:   Procedure Laterality Date     LAPAROSCOPIC CHOLECYSTECTOMY  2014    Procedure: LAPAROSCOPIC CHOLECYSTECTOMY;  Surgeon: Chay Shafer MD;  Location:  OR     Social History   Substance Use Topics     Smoking status: Never Smoker     Smokeless tobacco: Never Used     Alcohol use 0.0 oz/week     0 Standard drinks or equivalent per week     Patient Active Problem List   Diagnosis     Cholecystitis with cholelithiasis     Thyroid disease     Benign essential hypertension     Hyperlipidemia     Family History   Problem Relation Age of Onset     Hyperlipidemia Father      Family History Negative Mother       MVA         Problem list, Medication list, Allergies, and Medical/Social/Surgical histories reviewed in [facility] and updated as appropriate.    Review of Systems   Constitutional: Negative for chills and fever.   HENT: Positive for sore throat (itchy/scratchy).         Sneezing   Respiratory: Positive for cough. Negative for shortness of breath.    Cardiovascular: Negative for chest pain.   Gastrointestinal: Negative for abdominal pain, diarrhea, nausea and vomiting.   Skin: Negative for rash.   Neurological: Negative for focal weakness and headaches.   All other systems reviewed and are negative.        Physical Exam    Constitutional: She is oriented to person, place, and time and well-developed, well-nourished, and in no distress.   HENT:   Head: Normocephalic and atraumatic.   Right Ear: Tympanic membrane, external ear and ear canal normal.   Left Ear: Tympanic membrane, external ear and ear canal normal.   Mouth/Throat: Uvula is midline and mucous membranes are normal. No oropharyngeal exudate, posterior oropharyngeal edema, posterior oropharyngeal erythema or tonsillar abscesses.   Postnasal drip   Cardiovascular: Normal rate, regular rhythm and normal heart sounds.    Pulmonary/Chest: Effort normal and breath sounds normal.   Musculoskeletal: Normal range of motion.   Neurological: She is alert and oriented to person, place, and time. Gait normal.   Skin: Skin is warm and dry.   Nursing note and vitals reviewed.      Vital Signs  /89 (BP Location: Right arm, Cuff Size: Adult Large)  Pulse 86  Temp 98.1  F (36.7  C) (Oral)  SpO2 98%  Breastfeeding? No     Diagnostic Test Results:  Strep- negative  ASSESSMENT/PLAN      ICD-10-CM    1. Acute seasonal allergic rhinitis, unspecified trigger J30.2 Beta strep group A culture     fluticasone (FLONASE) 50 MCG/ACT spray   2. Viral upper respiratory tract infection J06.9 guaiFENesin-codeine (ROBITUSSIN AC) 100-10 MG/5ML SOLN solution    B97.89         Lungs CTAB, afebrile, no resp distress. Strep negative. Suspect pt has allergic rhinitis which has caused symptoms over past month, and now also has viral URI/bronchitis for the past week. Supportive treatment for viral illness discussed. Will also start on Flonase and give Robitussin AC.    I have discussed any lab or imaging results, the patient's diagnosis, and my plan of treatment with the patient and/or family. Patient is aware to come back in if with worsening symptoms or if no relief despite treatment plan.  Patient voiced understanding and had no further questions.       Follow Up: Return if symptoms worsen or fail to  improve.    CARO Salas, PA-C  [facility]

## 2018-09-14 ENCOUNTER — TRANSFERRED RECORDS (OUTPATIENT)
Dept: HEALTH INFORMATION MANAGEMENT | Facility: CLINIC | Age: 53
End: 2018-09-14

## 2018-09-14 LAB
ALT SERPL-CCNC: 21 IU/L (ref 0–32)
AST SERPL-CCNC: 21 IU/L (ref 0–40)
CHOLEST SERPL-MCNC: 190 MG/DL (ref 100–199)
CREAT SERPL-MCNC: 0.86 MG/DL (ref 0.57–1)
GFR SERPL CREATININE-BSD FRML MDRD: 78 ML/MIN/1.73
GLUCOSE SERPL-MCNC: 112 MG/DL (ref 65–99)
HBA1C MFR BLD: 6 % (ref 4.8–5.6)
HDLC SERPL-MCNC: 36 MG/DL
LDLC SERPL CALC-MCNC: 110 MG/DL (ref 0–99)
POTASSIUM SERPL-SCNC: 3.8 MMOL/L (ref 3.5–5.2)
TRIGL SERPL-MCNC: 219 MG/DL (ref 0–149)
TSH SERPL-ACNC: 3.59 UIU/ML (ref 0.45–4.5)

## 2019-03-05 DIAGNOSIS — I10 ESSENTIAL HYPERTENSION: ICD-10-CM

## 2019-03-05 NOTE — TELEPHONE ENCOUNTER
See 11/28/18 refill encounter.  Insurance will only cover 90 day supply.  Pended.  Patient is overdue for OV.  SIG & Pharm notes given again.  Patient was called several times before, no response.    RT Luana (R)

## 2019-03-06 NOTE — TELEPHONE ENCOUNTER
"I reached Susi I response to med refill.   We have tried to schedule BP check and labs in the past.  She reports she had potassium done by GYN 9/14/18: Dr Evan Kovacs and it was 3.9.  She will fax to us now.     I will wait to schedule BP nurse appt until Dr Todd weighs in on whether she should repeat potassium.    Mariann Shahid RN- Triage [employer]        Requested Prescriptions   Pending Prescriptions Disp Refills     triamterene-HCTZ (DYAZIDE) 37.5-25 MG capsule 90 capsule 0     Sig: Take 1 capsule by mouth daily Overdue for office visit    Diuretics (Including Combos) Protocol Failed - 3/6/2019  3:29 PM       Failed - Normal serum potassium on file in past 12 months    Recent Labs   Lab Test 04/02/18  1030   POTASSIUM 3.2*                   Passed - Blood pressure under 140/90 in past 12 months    BP Readings from Last 3 Encounters:   05/07/18 116/89   04/30/18 (!) 121/94   04/11/18 119/87                Passed - Recent (12 mo) or future (30 days) visit within the authorizing provider's specialty    Patient had office visit in the last 12 months or has a visit in the next 30 days with authorizing provider or within the authorizing provider's specialty.  See \"Patient Info\" tab in inbasket, or \"Choose Columns\" in Meds & Orders section of the refill encounter.             Passed - Medication is active on med list       Passed - Patient is age 18 or older       Passed - No active pregancy on record       Passed - Normal serum creatinine on file in past 12 months    Recent Labs   Lab Test 04/02/18  1030   CR 0.65             Passed - Normal serum sodium on file in past 12 months    Recent Labs   Lab Test 04/02/18  1030                Passed - No positive pregnancy test in past 12 months          "

## 2019-03-07 RX ORDER — TRIAMTERENE AND HYDROCHLOROTHIAZIDE 37.5; 25 MG/1; MG/1
1 CAPSULE ORAL DAILY
Qty: 30 CAPSULE | Refills: 0 | Status: SHIPPED | OUTPATIENT
Start: 2019-03-07 | End: 2019-03-29

## 2019-03-07 NOTE — TELEPHONE ENCOUNTER
Patient really needs a clinic visit, please call and schedule. 30 day refill given.    Please also ask patient if she has had her Pap smear done within the last 3 years.  If yes, please record information and route back to me.

## 2019-03-29 ENCOUNTER — OFFICE VISIT (OUTPATIENT)
Dept: FAMILY MEDICINE | Facility: CLINIC | Age: 54
End: 2019-03-29
Payer: COMMERCIAL

## 2019-03-29 ENCOUNTER — TELEPHONE (OUTPATIENT)
Dept: FAMILY MEDICINE | Facility: CLINIC | Age: 54
End: 2019-03-29

## 2019-03-29 VITALS
BODY MASS INDEX: 39.38 KG/M2 | SYSTOLIC BLOOD PRESSURE: 112 MMHG | DIASTOLIC BLOOD PRESSURE: 79 MMHG | HEART RATE: 87 BPM | OXYGEN SATURATION: 96 % | HEIGHT: 62 IN | TEMPERATURE: 98.3 F | WEIGHT: 214 LBS

## 2019-03-29 DIAGNOSIS — G89.29 CHRONIC RIGHT-SIDED LOW BACK PAIN WITHOUT SCIATICA: ICD-10-CM

## 2019-03-29 DIAGNOSIS — H93.13 TINNITUS, BILATERAL: ICD-10-CM

## 2019-03-29 DIAGNOSIS — R73.9 ELEVATED SERUM GLUCOSE: ICD-10-CM

## 2019-03-29 DIAGNOSIS — R82.90 NONSPECIFIC FINDING ON EXAMINATION OF URINE: ICD-10-CM

## 2019-03-29 DIAGNOSIS — M54.50 CHRONIC RIGHT-SIDED LOW BACK PAIN WITHOUT SCIATICA: ICD-10-CM

## 2019-03-29 DIAGNOSIS — E66.01 MORBID OBESITY (H): ICD-10-CM

## 2019-03-29 DIAGNOSIS — I10 ESSENTIAL HYPERTENSION: Primary | ICD-10-CM

## 2019-03-29 LAB
ALBUMIN UR-MCNC: NEGATIVE MG/DL
ANION GAP SERPL CALCULATED.3IONS-SCNC: 7 MMOL/L (ref 3–14)
APPEARANCE UR: CLEAR
BACTERIA #/AREA URNS HPF: ABNORMAL /HPF
BILIRUB UR QL STRIP: NEGATIVE
BUN SERPL-MCNC: 13 MG/DL (ref 7–30)
CALCIUM SERPL-MCNC: 9.3 MG/DL (ref 8.5–10.1)
CHLORIDE SERPL-SCNC: 107 MMOL/L (ref 94–109)
CO2 SERPL-SCNC: 26 MMOL/L (ref 20–32)
COLOR UR AUTO: YELLOW
CREAT SERPL-MCNC: 0.83 MG/DL (ref 0.52–1.04)
GFR SERPL CREATININE-BSD FRML MDRD: 80 ML/MIN/{1.73_M2}
GLUCOSE SERPL-MCNC: 114 MG/DL (ref 70–99)
GLUCOSE UR STRIP-MCNC: NEGATIVE MG/DL
HBA1C MFR BLD: 6 % (ref 0–5.6)
HGB UR QL STRIP: ABNORMAL
KETONES UR STRIP-MCNC: NEGATIVE MG/DL
LEUKOCYTE ESTERASE UR QL STRIP: NEGATIVE
NITRATE UR QL: NEGATIVE
NON-SQ EPI CELLS #/AREA URNS LPF: ABNORMAL /LPF
PH UR STRIP: 6.5 PH (ref 5–7)
POTASSIUM SERPL-SCNC: 4 MMOL/L (ref 3.4–5.3)
RBC #/AREA URNS AUTO: ABNORMAL /HPF
SODIUM SERPL-SCNC: 140 MMOL/L (ref 133–144)
SOURCE: ABNORMAL
SP GR UR STRIP: 1.02 (ref 1–1.03)
UROBILINOGEN UR STRIP-ACNC: 0.2 EU/DL (ref 0.2–1)
WBC #/AREA URNS AUTO: ABNORMAL /HPF

## 2019-03-29 PROCEDURE — 81001 URINALYSIS AUTO W/SCOPE: CPT | Performed by: INTERNAL MEDICINE

## 2019-03-29 PROCEDURE — 80048 BASIC METABOLIC PNL TOTAL CA: CPT | Performed by: INTERNAL MEDICINE

## 2019-03-29 PROCEDURE — 83036 HEMOGLOBIN GLYCOSYLATED A1C: CPT | Performed by: INTERNAL MEDICINE

## 2019-03-29 PROCEDURE — 36415 COLL VENOUS BLD VENIPUNCTURE: CPT | Performed by: INTERNAL MEDICINE

## 2019-03-29 PROCEDURE — 99214 OFFICE O/P EST MOD 30 MIN: CPT | Performed by: INTERNAL MEDICINE

## 2019-03-29 RX ORDER — TRIAMTERENE AND HYDROCHLOROTHIAZIDE 37.5; 25 MG/1; MG/1
1 CAPSULE ORAL DAILY
Qty: 90 CAPSULE | Refills: 1 | Status: SHIPPED | OUTPATIENT
Start: 2019-03-29 | End: 2019-10-04

## 2019-03-29 ASSESSMENT — MIFFLIN-ST. JEOR: SCORE: 1528.95

## 2019-03-29 NOTE — PROGRESS NOTES
"HPI      SUBJECTIVE:   Susi Rossi is a 53 year old female who presents to clinic today for the following health issues:      Medication Followup:  Triamterene-HCTZ (DYAZIDE) 37.5-25 MG capsule    Taking Medication as prescribed: yes    Side Effects:  None    Medication Helping Symptoms:  yes       In addition to the patient's hypertension, she has a couple of concerns that she wants to discuss today.    She has been experiencing chronic right-sided low back pain that does not radiate to her lower extremities.    Also has ringing in both of her ears but denies ear pain or significant hearing loss.      Past Medical History:   Diagnosis Date     Benign essential hypertension      Cholecystitis with cholelithiasis 5/5/2014     Gallstones      Hyperlipidemia      Thyroid disease        Review of Systems   Constitutional: Negative for malaise/fatigue.   HENT: Positive for tinnitus. Negative for ear discharge, ear pain and hearing loss.    Respiratory: Negative for shortness of breath.    Cardiovascular: Negative for chest pain and palpitations.   Gastrointestinal: Negative for abdominal pain, nausea and vomiting.   Musculoskeletal: Positive for back pain.   Neurological: Negative for dizziness, sensory change, focal weakness and headaches.       /79 (BP Location: Left arm, Cuff Size: Adult Large)   Pulse 87   Temp 98.3  F (36.8  C) (Oral)   Ht 1.575 m (5' 2\")   Wt 97.1 kg (214 lb)   SpO2 96%   BMI 39.14 kg/m        Physical Exam   Constitutional: She is oriented to person, place, and time. No distress.   HENT:   Head: Atraumatic.   Right Ear: External ear normal.   Left Ear: External ear normal.   Nose: Nose normal.   Mouth/Throat: Oropharynx is clear and moist.   Eyes: Pupils are equal, round, and reactive to light. Conjunctivae and EOM are normal.   Neck: Normal range of motion. Neck supple. No thyromegaly present.   Cardiovascular: Normal rate, regular rhythm and normal heart sounds. "   Pulmonary/Chest: Effort normal and breath sounds normal. No respiratory distress.   Abdominal: Soft. Bowel sounds are normal. There is no tenderness.   Musculoskeletal: Normal range of motion. She exhibits no edema or tenderness.   Lymphadenopathy:     She has no cervical adenopathy.   Neurological: She is alert and oriented to person, place, and time. She has normal reflexes. Coordination normal.   Skin: No rash noted.   (+) non-infected sebaceous cyst   Nursing note and vitals reviewed.        ICD-10-CM    1. Essential hypertension I10 Basic metabolic panel     triamterene-HCTZ (DYAZIDE) 37.5-25 MG capsule   2. Chronic right-sided low back pain without sciatica M54.5 [facility], [facility] CHIROPRACTIC REFERRAL    G89.29    3. Tinnitus, bilateral H93.13 OTOLARYNGOLOGY REFERRAL   4. Elevated serum glucose R73.9 Hemoglobin A1c     UA reflex to Microscopic and Culture   5. Nonspecific finding on examination of urine R82.90 Urine Microscopic   6. Morbid obesity (H) E66.01          Patient Instructions   Consult with:  -- physical therapy if your right lower back pain becomes more persistent/frequent  -- ENT if your tinnitus worsens or if you develop any new ear symptoms (check your blood pressure when your tinnitus occurs and inform doctor if it is elevated)    Monitor your blood pressure twice a day (once you wake up and before bedtime).  Call doctor if:  -- your blood pressure for the top/upper number is greater than 140 or less than 90  -- your blood pressure for the bottom/lower number is greater than 90 or less than 60    Seek medical attention if your sebaceous cyst becomes red/swollen/tender.    Follow up in 6 months.

## 2019-03-29 NOTE — PATIENT INSTRUCTIONS
Consult with:  -- physical therapy if your right lower back pain becomes more persistent/frequent  -- ENT if your tinnitus worsens or if you develop any new ear symptoms (check your blood pressure when your tinnitus occurs and inform doctor if it is elevated)    Monitor your blood pressure twice a day (once you wake up and before bedtime).  Call doctor if:  -- your blood pressure for the top/upper number is greater than 140 or less than 90  -- your blood pressure for the bottom/lower number is greater than 90 or less than 60    Seek medical attention if your sebaceous cyst becomes red/swollen/tender.    Follow up in 6 months.

## 2019-03-29 NOTE — TELEPHONE ENCOUNTER
Please abstract the following data from this visit with this patient into the appropriate field in Epic:    Pap smear done on this date: 07/2018 (approximately), by this group: [facility], results were Normal .

## 2019-04-08 ASSESSMENT — ENCOUNTER SYMPTOMS
BACK PAIN: 1
PALPITATIONS: 0
SHORTNESS OF BREATH: 0
SENSORY CHANGE: 0
ABDOMINAL PAIN: 0
DIZZINESS: 0
VOMITING: 0
FOCAL WEAKNESS: 0
NAUSEA: 0
HEADACHES: 0

## 2019-10-03 DIAGNOSIS — I10 ESSENTIAL HYPERTENSION: ICD-10-CM

## 2019-10-04 RX ORDER — TRIAMTERENE AND HYDROCHLOROTHIAZIDE 37.5; 25 MG/1; MG/1
1 CAPSULE ORAL DAILY
Qty: 30 CAPSULE | Refills: 0 | Status: SHIPPED | OUTPATIENT
Start: 2019-10-04 | End: 2019-10-17

## 2019-10-04 NOTE — TELEPHONE ENCOUNTER
" triamterene-HCTZ (DYAZIDE) 37.5-25 MG capsule    Last Written Prescription Date:  03/29/2019  Last Fill Quantity: 90,  # refills: 1   Last office visit: 3/29/2019 with prescribing provider:  Perez   Future Office Visit:  Unknown     Requested Prescriptions   Pending Prescriptions Disp Refills     triamterene-HCTZ (DYAZIDE) 37.5-25 MG capsule 90 capsule 1     Sig: Take 1 capsule by mouth daily Please schedule follow-up with Dr. Todd in September       Diuretics (Including Combos) Protocol Passed - 10/3/2019  1:19 PM        Passed - Blood pressure under 140/90 in past 12 months     BP Readings from Last 3 Encounters:   03/29/19 112/79   05/07/18 116/89   04/30/18 (!) 121/94                 Passed - Recent (12 mo) or future (30 days) visit within the authorizing provider's specialty     Patient has had an office visit with the authorizing provider or a provider within the authorizing providers department within the previous 12 mos or has a future within next 30 days. See \"Patient Info\" tab in inbasket, or \"Choose Columns\" in Meds & Orders section of the refill encounter.              Passed - Medication is active on med list        Passed - Patient is age 18 or older        Passed - No active pregancy on record        Passed - Normal serum creatinine on file in past 12 months     Recent Labs   Lab Test 03/29/19  0943   CR 0.83              Passed - Normal serum potassium on file in past 12 months     Recent Labs   Lab Test 03/29/19  0943   POTASSIUM 4.0                    Passed - Normal serum sodium on file in past 12 months     Recent Labs   Lab Test 03/29/19  0943                 Passed - No positive pregnancy test in past 12 months          "

## 2019-10-17 NOTE — TELEPHONE ENCOUNTER
To PCP: Called [facility] - They received 30 day Rx for Dyazide but states pt's insurance won't cover it. They will only cover 90 day so pt did not yet pick it up - please advise     TO TCs - Routing to contact patient to inform due for appointment. Thank you.     Blanca SALMON RN

## 2019-10-17 NOTE — TELEPHONE ENCOUNTER
Reason for Call:  Other prescription    Detailed comments: Pt called states that pharmacy told her they don't have approval from pt PCP. Please contact pharmacy.     Phone Number Patient can be reached at: Home number on file [phone] (home)    Best Time: Anytime     Can we leave a detailed message on this number? YES    Call taken on 10/17/2019 at 8:25 AM by Devin Otoole

## 2019-10-18 RX ORDER — TRIAMTERENE AND HYDROCHLOROTHIAZIDE 37.5; 25 MG/1; MG/1
1 CAPSULE ORAL DAILY
Qty: 90 CAPSULE | Refills: 1 | Status: SHIPPED | OUTPATIENT
Start: 2019-10-18 | End: 2020-04-15

## 2019-10-18 RX ORDER — TRIAMTERENE AND HYDROCHLOROTHIAZIDE 37.5; 25 MG/1; MG/1
1 CAPSULE ORAL DAILY
Qty: 30 CAPSULE | Refills: 0 | Status: SHIPPED | OUTPATIENT
Start: 2019-10-18 | End: 2019-10-18

## 2019-10-18 NOTE — TELEPHONE ENCOUNTER
"I called Pt to schedule. Pt stated \"I was just there in July\". I told patient her  Last appointment with Dr. Todd was in March. Pt argued that her last  Appointment was in July. I informed her that she was supposed to follow   Up with Dr. Todd in September. Pt stated \" It's October so how am I supposed  To follow up with him in September?\" Patient states she was not informed at her  Last appointment that she needed to follow up and she needs this medication.  Patient was very argumentative. Can an RN please call her and tell her why she  Needs a follow up appointment?     "

## 2019-10-18 NOTE — TELEPHONE ENCOUNTER
Dr Todd,   See below messages  Pt calling back today  Upset you need to see her for 6 month f/u  Patient saw OBGYN provider last week (outside [facility])  Doesn't see need to see you for anything she said  Wanting #90 with 1 refill  Please authorize if appropriate.  Thanks,  Le WHITTAKER RN

## 2019-10-18 NOTE — TELEPHONE ENCOUNTER
Again, fax from pharmacy.  Insurance will not cover 30 day fill of medication.  Only 90 day (it is a maintenance medication).  See message below - patient upset that she needs to be seen.  Dr Todd, please address and approve/deny medication.    Griselda Mahoney, RT (R)

## 2019-11-08 ENCOUNTER — HEALTH MAINTENANCE LETTER (OUTPATIENT)
Age: 54
End: 2019-11-08

## 2020-04-14 DIAGNOSIS — I10 ESSENTIAL HYPERTENSION: ICD-10-CM

## 2020-04-14 NOTE — TELEPHONE ENCOUNTER
Refill request:    TRIAMTERENE 37.5-25 mg cap. Qty 90.    Summary: Take 1 capsule by mouth daily, Disp-90 capsule,R-1, E-Prescribe   Dose, Route, Frequency: 1 capsule, Oral, DAILY  Start: 10/18/2019  Ord/Sold: 10/18/2019

## 2020-04-15 RX ORDER — TRIAMTERENE AND HYDROCHLOROTHIAZIDE 37.5; 25 MG/1; MG/1
1 CAPSULE ORAL DAILY
Qty: 90 CAPSULE | Refills: 0 | Status: SHIPPED | OUTPATIENT
Start: 2020-04-15 | End: 2020-07-07

## 2020-04-15 NOTE — TELEPHONE ENCOUNTER
"  triamterene-HCTZ (DYAZIDE) 37.5-25 MG capsule  90 capsule  1  10/18/2019          Last Written Prescription Date:  10/18/2019  Last Fill Quantity: 90,  # refills: 1   Last office visit: 3/29/2019 with prescribing provider:     Future Office Visit:  Unknown      Requested Prescriptions   Pending Prescriptions Disp Refills     triamterene-HCTZ (DYAZIDE) 37.5-25 MG capsule 90 capsule 1     Sig: Take 1 capsule by mouth daily       Diuretics (Including Combos) Protocol Failed - 4/15/2020 10:20 AM        Failed - Blood pressure under 140/90 in past 12 months     BP Readings from Last 3 Encounters:   03/29/19 112/79   05/07/18 116/89   04/30/18 (!) 121/94                 Failed - Recent (12 mo) or future (30 days) visit within the authorizing provider's specialty     Patient has had an office visit with the authorizing provider or a provider within the authorizing providers department within the previous 12 mos or has a future within next 30 days. See \"Patient Info\" tab in inbasket, or \"Choose Columns\" in Meds & Orders section of the refill encounter.              Failed - Normal serum creatinine on file in past 12 months     Recent Labs   Lab Test 03/29/19  0943   CR 0.83              Failed - Normal serum potassium on file in past 12 months     Recent Labs   Lab Test 03/29/19  0943   POTASSIUM 4.0                    Failed - Normal serum sodium on file in past 12 months     Recent Labs   Lab Test 03/29/19  0943                 Passed - Medication is active on med list        Passed - Patient is age 18 or older        Passed - No active pregancy on record        Passed - No positive pregnancy test in past 12 months             "

## 2020-04-15 NOTE — TELEPHONE ENCOUNTER
Patient called in worring about meds being filled insurance is changing end of the month and would like to know ahead of thing if she would need to come in to office? Please let her know .      Windy STOCK

## 2020-07-06 DIAGNOSIS — I10 ESSENTIAL HYPERTENSION: ICD-10-CM

## 2020-07-07 RX ORDER — TRIAMTERENE AND HYDROCHLOROTHIAZIDE 37.5; 25 MG/1; MG/1
1 CAPSULE ORAL DAILY
Qty: 90 CAPSULE | Refills: 0 | Status: SHIPPED | OUTPATIENT
Start: 2020-07-07 | End: 2020-11-06

## 2020-07-07 NOTE — TELEPHONE ENCOUNTER
Medication refilled one time.  Pharmacy instructed to notify patient to call and send a follow up, either in clinic or virtual as appropriate.

## 2020-11-06 DIAGNOSIS — I10 ESSENTIAL HYPERTENSION: ICD-10-CM

## 2020-11-06 NOTE — TELEPHONE ENCOUNTER
LOV 3- Todd  No future OV scheduled    30 days pended R0    SIG & Pharm note given, will send [employer] message    RT Luana (R)

## 2020-11-09 RX ORDER — TRIAMTERENE AND HYDROCHLOROTHIAZIDE 37.5; 25 MG/1; MG/1
1 CAPSULE ORAL DAILY
Qty: 90 CAPSULE | Refills: 0 | Status: SHIPPED | OUTPATIENT
Start: 2020-11-09 | End: 2020-11-24

## 2020-11-09 NOTE — TELEPHONE ENCOUNTER
Pt scheduled with Dr. Vera    Medication filled 1 time as pt is due for a follow-up in clinic. Patient is already scheduled for upcoming appointment.

## 2020-11-24 ENCOUNTER — HOSPITAL ENCOUNTER (OUTPATIENT)
Dept: MAMMOGRAPHY | Facility: CLINIC | Age: 55
Discharge: HOME | End: 2020-11-24
Attending: INTERNAL MEDICINE | Admitting: INTERNAL MEDICINE
Payer: COMMERCIAL

## 2020-11-24 ENCOUNTER — OFFICE VISIT (OUTPATIENT)
Dept: FAMILY MEDICINE | Facility: CLINIC | Age: 55
End: 2020-11-24
Payer: COMMERCIAL

## 2020-11-24 VITALS
SYSTOLIC BLOOD PRESSURE: 115 MMHG | TEMPERATURE: 96.9 F | BODY MASS INDEX: 37.36 KG/M2 | OXYGEN SATURATION: 98 % | HEART RATE: 83 BPM | WEIGHT: 203 LBS | DIASTOLIC BLOOD PRESSURE: 81 MMHG | HEIGHT: 62 IN

## 2020-11-24 DIAGNOSIS — I10 ESSENTIAL HYPERTENSION: Primary | ICD-10-CM

## 2020-11-24 DIAGNOSIS — E78.6 LOW HDL (UNDER 40): ICD-10-CM

## 2020-11-24 DIAGNOSIS — Z11.4 SCREENING FOR HIV (HUMAN IMMUNODEFICIENCY VIRUS): ICD-10-CM

## 2020-11-24 DIAGNOSIS — E78.5 HYPERLIPIDEMIA, UNSPECIFIED HYPERLIPIDEMIA TYPE: ICD-10-CM

## 2020-11-24 DIAGNOSIS — E66.01 MORBID OBESITY (H): ICD-10-CM

## 2020-11-24 DIAGNOSIS — E03.9 HYPOTHYROIDISM, UNSPECIFIED TYPE: ICD-10-CM

## 2020-11-24 DIAGNOSIS — R73.03 PREDIABETES: ICD-10-CM

## 2020-11-24 DIAGNOSIS — R79.89 ELEVATED LFTS: ICD-10-CM

## 2020-11-24 DIAGNOSIS — Z12.31 ENCOUNTER FOR SCREENING MAMMOGRAM FOR BREAST CANCER: ICD-10-CM

## 2020-11-24 DIAGNOSIS — E11.9 TYPE 2 DIABETES MELLITUS WITHOUT COMPLICATION, WITHOUT LONG-TERM CURRENT USE OF INSULIN (H): ICD-10-CM

## 2020-11-24 LAB
ALBUMIN SERPL-MCNC: 4 G/DL (ref 3.4–5)
ALP SERPL-CCNC: 109 U/L (ref 40–150)
ALT SERPL W P-5'-P-CCNC: 53 U/L (ref 0–50)
ANION GAP SERPL CALCULATED.3IONS-SCNC: 5 MMOL/L (ref 3–14)
AST SERPL W P-5'-P-CCNC: 33 U/L (ref 0–45)
BASOPHILS # BLD AUTO: 0.1 10E9/L (ref 0–0.2)
BASOPHILS NFR BLD AUTO: 0.7 %
BILIRUB SERPL-MCNC: 0.7 MG/DL (ref 0.2–1.3)
BUN SERPL-MCNC: 17 MG/DL (ref 7–30)
CALCIUM SERPL-MCNC: 9.7 MG/DL (ref 8.5–10.1)
CHLORIDE SERPL-SCNC: 107 MMOL/L (ref 94–109)
CHOLEST SERPL-MCNC: 150 MG/DL
CO2 SERPL-SCNC: 27 MMOL/L (ref 20–32)
CREAT SERPL-MCNC: 0.81 MG/DL (ref 0.52–1.04)
DIFFERENTIAL METHOD BLD: NORMAL
EOSINOPHIL # BLD AUTO: 0.2 10E9/L (ref 0–0.7)
EOSINOPHIL NFR BLD AUTO: 2.4 %
ERYTHROCYTE [DISTWIDTH] IN BLOOD BY AUTOMATED COUNT: 13.4 % (ref 10–15)
GFR SERPL CREATININE-BSD FRML MDRD: 82 ML/MIN/{1.73_M2}
GLUCOSE SERPL-MCNC: 156 MG/DL (ref 70–99)
HBA1C MFR BLD: 6.8 % (ref 0–5.6)
HCT VFR BLD AUTO: 43.7 % (ref 35–47)
HDLC SERPL-MCNC: 30 MG/DL
HGB BLD-MCNC: 14.8 G/DL (ref 11.7–15.7)
LDLC SERPL CALC-MCNC: 68 MG/DL
LYMPHOCYTES # BLD AUTO: 2.8 10E9/L (ref 0.8–5.3)
LYMPHOCYTES NFR BLD AUTO: 37.8 %
MCH RBC QN AUTO: 28.5 PG (ref 26.5–33)
MCHC RBC AUTO-ENTMCNC: 33.9 G/DL (ref 31.5–36.5)
MCV RBC AUTO: 84 FL (ref 78–100)
MONOCYTES # BLD AUTO: 0.7 10E9/L (ref 0–1.3)
MONOCYTES NFR BLD AUTO: 8.9 %
NEUTROPHILS # BLD AUTO: 3.7 10E9/L (ref 1.6–8.3)
NEUTROPHILS NFR BLD AUTO: 50.2 %
NONHDLC SERPL-MCNC: 120 MG/DL
PLATELET # BLD AUTO: 280 10E9/L (ref 150–450)
POTASSIUM SERPL-SCNC: 3.8 MMOL/L (ref 3.4–5.3)
PROT SERPL-MCNC: 7.2 G/DL (ref 6.8–8.8)
RBC # BLD AUTO: 5.2 10E12/L (ref 3.8–5.2)
SODIUM SERPL-SCNC: 139 MMOL/L (ref 133–144)
T4 FREE SERPL-MCNC: 1.68 NG/DL (ref 0.76–1.46)
TRIGL SERPL-MCNC: 258 MG/DL
TSH SERPL DL<=0.005 MIU/L-ACNC: 0.04 MU/L (ref 0.4–4)
WBC # BLD AUTO: 7.4 10E9/L (ref 4–11)

## 2020-11-24 PROCEDURE — 90471 IMMUNIZATION ADMIN: CPT | Performed by: INTERNAL MEDICINE

## 2020-11-24 PROCEDURE — 80061 LIPID PANEL: CPT | Performed by: INTERNAL MEDICINE

## 2020-11-24 PROCEDURE — 83036 HEMOGLOBIN GLYCOSYLATED A1C: CPT | Performed by: INTERNAL MEDICINE

## 2020-11-24 PROCEDURE — 90682 RIV4 VACC RECOMBINANT DNA IM: CPT | Performed by: INTERNAL MEDICINE

## 2020-11-24 PROCEDURE — 36415 COLL VENOUS BLD VENIPUNCTURE: CPT | Performed by: INTERNAL MEDICINE

## 2020-11-24 PROCEDURE — 84439 ASSAY OF FREE THYROXINE: CPT | Performed by: INTERNAL MEDICINE

## 2020-11-24 PROCEDURE — 77063 BREAST TOMOSYNTHESIS BI: CPT

## 2020-11-24 PROCEDURE — 87389 HIV-1 AG W/HIV-1&-2 AB AG IA: CPT | Performed by: INTERNAL MEDICINE

## 2020-11-24 PROCEDURE — 99214 OFFICE O/P EST MOD 30 MIN: CPT | Mod: 25 | Performed by: INTERNAL MEDICINE

## 2020-11-24 PROCEDURE — 80050 GENERAL HEALTH PANEL: CPT | Performed by: INTERNAL MEDICINE

## 2020-11-24 RX ORDER — ROSUVASTATIN CALCIUM 20 MG/1
TABLET, COATED ORAL
COMMUNITY
Start: 2020-10-15 | End: 2021-07-13

## 2020-11-24 RX ORDER — FEXOFENADINE HCL 180 MG/1
180 TABLET ORAL DAILY
COMMUNITY

## 2020-11-24 RX ORDER — TRIAMTERENE AND HYDROCHLOROTHIAZIDE 37.5; 25 MG/1; MG/1
1 CAPSULE ORAL DAILY
Qty: 90 CAPSULE | Refills: 3 | Status: SHIPPED | OUTPATIENT
Start: 2020-11-24 | End: 2021-02-06

## 2020-11-24 RX ORDER — LEVOTHYROXINE SODIUM 200 MCG
TABLET ORAL
COMMUNITY
Start: 2020-10-15 | End: 2020-11-25

## 2020-11-24 ASSESSMENT — MIFFLIN-ST. JEOR: SCORE: 1469.05

## 2020-11-24 NOTE — PROGRESS NOTES
"Subjective     Susi Ira Rossi is a 55 year old female who presents to clinic today for the following health issues:    HPI         Former PCP: Perez  Specialists: Evan Kovacs (gyn)  Problem list and medications reviewed and updated. See below for additional notes.  Social: nonsmoker, occasional etoh  HM: due for flu shot    HTN: taking medication, no adverse effects.  No cp/sob/ha/ No dizziness.     Allergies: flonase rare use, prn singulair. Taking allerflex (generic allegra) regularly instead of claritin at this point. Denies asthma.     Gyn manages her hld/hypothyroid. She states last labs about one year ago. Dose of thyroid was increased last year and lipitor was switched to crestor last year.     She is agreeable to updating labs today. Reports prediabetes. Exercising with walking. Interested in nutrition referral.      Review of Systems   Constitutional, HEENT, cardiovascular, pulmonary, gi and gu systems are negative, except as otherwise noted.      Objective    /81 (BP Location: Left arm, Patient Position: Chair, Cuff Size: Adult Large)   Pulse 83   Temp 96.9  F (36.1  C) (Temporal)   Ht 1.575 m (5' 2\")   Wt 92.1 kg (203 lb)   SpO2 98%   BMI 37.13 kg/m    Body mass index is 37.13 kg/m .  Physical Exam     GENERAL APPEARANCE: AAOx3, no distress. Well developed.    RESP: Lungs CTA bilaterally. No w/r/r. No distress     CV: RRR, S1/S2 present. No m/r/c.     ABDOMEN:  soft, nontender, no distention. No rebound or guarding.     EXT: No c/c/e in lower extremities b/l. No rashes or deformities noted.    PSYCH: appropriate mood and affect.               Assessment & Plan     Susi was seen today for establish care.    Diagnoses and all orders for this visit:    Essential hypertension  Well controlled. Discussed MOA of her diuretic and monitoring renal function/lytes. Check labs.  -     Comprehensive metabolic panel  -     CBC with platelets differential  -     triamterene-HCTZ (DYAZIDE) " 37.5-25 MG capsule; Take 1 capsule by mouth daily    Hyperlipidemia, unspecified hyperlipidemia type  On crestor 20mg daily. Check lipids and lfts.   -     Comprehensive metabolic panel  -     Lipid panel reflex to direct LDL Fasting    Hypothyroidism, unspecified type  Currently managed by her gyn. Recheck levels as dose increased last year.  -     TSH with free T4 reflex    Encounter for screening mammogram for breast cancer  Due, discussed.  -     MA Screen Bilateral w/Doni; Future    Screening for HIV (human immunodeficiency virus)  Due, discussed. Patient agreeable.  -     HIV Antigen Antibody Combo    Prediabetes  Recheck. Prediabetes education referral and Follow-up in 3-6 months depending on lab results.  -     Hemoglobin A1c  -     AMBULATORY ADULT DIABETES EDUCATOR REFERRAL; Future    Obesity (BMI 35.0-39.9) with comorbidity (H)  -     TSH with free T4 reflex    Other orders  -     SC RIV4 (FLUBLOK) VACCINE RECOMBINANT DNA PRSRV ANTIBIO FREE, IM ([id number])                Return in about 6 months (around 5/24/2021) for Follow up, with me, in person, sooner if symptoms worsen or do not improve.    Korina Vera DO  [facility]

## 2020-11-24 NOTE — NURSING NOTE
Faxed [facility] to [facility]  Pt will call in to sched 6 month followup  Flu shot given.   Pt on way to get mammo  In 250   Then will got to 150 for labs

## 2020-11-24 NOTE — PATIENT INSTRUCTIONS
Patient Education     Prediabetes  You have been diagnosed with prediabetes. This means that the level of sugar (glucose) in your blood is too high. If you have prediabetes, you are at risk for developing type 2 diabetes. Type 2 diabetes is diagnosed when the level of glucose in the blood reaches a certain high level. With prediabetes, it hasn t reached this point yet. But it's higher than normal. It is vital to make lifestyle changes to lower your blood sugar, improve your health, and prevent diabetes.       Why worry about prediabetes?  Prediabetes is a condition where the body s cells have trouble using glucose in the blood for energy. As a result, too much glucose stays in the blood. This can affect how your heart and blood vessels work. Without changes in diet and lifestyle, the problem can get worse. Once you have type 2 diabetes, it's ongoing (chronic). It needs to be managed for the rest of your life. Diabetes can harm the body and your health by damaging organs, such as your eyes and kidneys. It makes you more likely to have heart disease. And it can damage nerves and blood vessels.   Who is a risk for prediabetes?  The exact cause of prediabetes is not clear. But certain risk factors make a person more likely to have it. These include:     A family history of type 2 diabetes    Being overweight    Being older than age 45    Have high blood pressure or high cholesterol     Having had gestational diabetes    Not being physically active    Being ,  American, , , , or   Diagnosing prediabetes  Prediabetes may have no symptoms. Or you may have some of the symptoms of diabetes (see below). The diagnosis is made with a blood test. You may have 1 or more of these blood tests:      Fasting glucose test. Blood is taken and tested after you have fasted (not eaten) for at least 8 hours. A normal test result is 99 milligrams per deciliter (mg/dL)  or lower. Prediabetes is 100 mg/dL to 125 mg/dL. Diabetes is 126 mg/dL or higher.    Glucose tolerance test. Your blood sugar is measured before and after you drink a very sugary liquid. A normal test result is 139 milligrams per deciliter (mg/dL) or lower. Prediabetes is 140 mg/dL to 199 mg/dL. Diabetes is 200 mg/dL or higher.    Hemoglobin A1c (HbA1c). Your HbA1c is normal if it is below 5.7%. Prediabetes is 5.7% to 6.4%. Diabetes is 6.5% or higher.   Treating prediabetes  The best way to treat prediabetes is to lose at least 5% to 7% of your current weight and be more physically active by getting at least 150 minutes a week of physical activity (at least 30 minutes daily.) When sitting for long periods of time, get up for short sessions of light activity every 30 minutes. These changes help the body s cells use blood sugar better. Even a small amount of weight loss can help. Work with your healthcare provider to make a plan to eat well and be more active. Keep in mind that small changes can add up. Other changes in your lifestyle (or even taking certain medicines, such as metformin) may make you less likely to develop diabetes. Your provider can talk with you about these. Stopping smoking will decrease your risk of developing diabetes. Don't use e-cigarettes or vaping products. But you may gain some weight if you are not careful.   Ask your healthcare provider for a referral to a lifestyle intervention program. This program will help you get to and stay at a 7% weight loss and increase physical activity.   Follow-up  If not treated, prediabetes can turn into diabetes. This is a serious health condition. Take steps to stop this from happening. Follow the treatment plan you have been given. You may have your blood glucose tested again in about 12 to 18 months.   Diabetes symptoms   Let your healthcare provider know if you have any of the following:    Always feel very tired    Feel very thirsty or hungry much of  the time    Have to urinate often    Lose weight for no reason    Feel numbness or tingling in your fingers or toes    Have cuts or bruises that don t seem to heal    Have blurry vision  Charles last reviewed this educational content on 6/1/2019 2000-2020 [employer]. [address] [place] [id number]. All rights reserved. This information is not intended as a substitute for professional medical care. Always follow your healthcare professional's instructions.

## 2020-11-25 PROBLEM — E11.9 DIABETES MELLITUS, TYPE 2 (H): Status: ACTIVE | Noted: 2020-11-25

## 2020-11-25 PROBLEM — R73.03 PREDIABETES: Status: RESOLVED | Noted: 2020-11-24 | Resolved: 2020-11-25

## 2020-11-25 PROBLEM — E78.6 LOW HDL (UNDER 40): Status: ACTIVE | Noted: 2020-11-25

## 2020-11-25 PROBLEM — R79.89 ELEVATED LFTS: Status: ACTIVE | Noted: 2020-11-25

## 2020-11-25 PROBLEM — E11.9 DIABETES MELLITUS, TYPE 2 (H): Chronic | Status: ACTIVE | Noted: 2020-11-25

## 2020-11-25 LAB — HIV 1+2 AB+HIV1 P24 AG SERPL QL IA: NONREACTIVE

## 2020-11-25 RX ORDER — LEVOTHYROXINE SODIUM 175 UG/1
175 TABLET ORAL DAILY
Qty: 90 TABLET | Refills: 0 | Status: SHIPPED | OUTPATIENT
Start: 2020-11-25 | End: 2021-03-08

## 2020-12-06 ENCOUNTER — HEALTH MAINTENANCE LETTER (OUTPATIENT)
Age: 55
End: 2020-12-06

## 2020-12-14 ENCOUNTER — TELEPHONE (OUTPATIENT)
Dept: FAMILY MEDICINE | Facility: CLINIC | Age: 55
End: 2020-12-14

## 2020-12-14 NOTE — TELEPHONE ENCOUNTER
Patient walked in to clinic approx 4:45pm asking to have Dr. Vera sign  A Specific Site Analysis Test Requisition form from [employer]. Form was shown to Dr. Vera who asked that patient schedule an appt to review this in person. ( Dr. Vera did meet with patient on 11- but there was no discussion regarding this form.) I made a copy of the form (if Dr. Vera wanted to review it before meeting with the patient at a  future appt) and gave the original back to the patient. I told her to bring the original form with her to the appt which I offered to schedule in the next week. She declined to schedule any future appt as the form was due on 12-.  She was very upset that this could not be done and stated she may need to establish care with a new provider.    Maryellen Mcknight MA

## 2021-02-03 DIAGNOSIS — I10 ESSENTIAL HYPERTENSION: ICD-10-CM

## 2021-02-06 RX ORDER — TRIAMTERENE AND HYDROCHLOROTHIAZIDE 37.5; 25 MG/1; MG/1
CAPSULE ORAL
Qty: 30 CAPSULE | Refills: 2 | Status: SHIPPED | OUTPATIENT
Start: 2021-02-06 | End: 2021-07-13

## 2021-02-23 ENCOUNTER — TELEPHONE (OUTPATIENT)
Dept: FAMILY MEDICINE | Facility: CLINIC | Age: 56
End: 2021-02-23

## 2021-02-23 NOTE — TELEPHONE ENCOUNTER
Diabetes Education Scheduling Outreach #1:    Call to patient to schedule. Patient is going to check with insurance on visit coverage. She asked for a call back in 1 month.    Plan for 2nd outreach attempt within 1 month.    [facility]  Diabetes and Nutrition Scheduling

## 2021-03-05 ENCOUNTER — OFFICE VISIT (OUTPATIENT)
Dept: FAMILY MEDICINE | Facility: CLINIC | Age: 56
End: 2021-03-05
Payer: COMMERCIAL

## 2021-03-05 VITALS
DIASTOLIC BLOOD PRESSURE: 93 MMHG | SYSTOLIC BLOOD PRESSURE: 131 MMHG | TEMPERATURE: 97.3 F | HEART RATE: 84 BPM | OXYGEN SATURATION: 100 % | BODY MASS INDEX: 37.17 KG/M2 | WEIGHT: 202 LBS | HEIGHT: 62 IN

## 2021-03-05 DIAGNOSIS — B02.9 HERPES ZOSTER WITHOUT COMPLICATION: Primary | ICD-10-CM

## 2021-03-05 DIAGNOSIS — E11.9 TYPE 2 DIABETES MELLITUS WITHOUT COMPLICATION, WITHOUT LONG-TERM CURRENT USE OF INSULIN (H): Chronic | ICD-10-CM

## 2021-03-05 DIAGNOSIS — E03.9 HYPOTHYROIDISM, UNSPECIFIED TYPE: ICD-10-CM

## 2021-03-05 DIAGNOSIS — R79.89 ELEVATED LFTS: ICD-10-CM

## 2021-03-05 PROBLEM — I10 ESSENTIAL HYPERTENSION: Chronic | Status: ACTIVE | Noted: 2018-04-02

## 2021-03-05 PROBLEM — E78.5 HYPERLIPIDEMIA, UNSPECIFIED HYPERLIPIDEMIA TYPE: Chronic | Status: ACTIVE | Noted: 2018-04-02

## 2021-03-05 LAB — HBA1C MFR BLD: 6.6 % (ref 0–5.6)

## 2021-03-05 PROCEDURE — 83036 HEMOGLOBIN GLYCOSYLATED A1C: CPT | Performed by: INTERNAL MEDICINE

## 2021-03-05 PROCEDURE — 99214 OFFICE O/P EST MOD 30 MIN: CPT | Performed by: INTERNAL MEDICINE

## 2021-03-05 PROCEDURE — 80076 HEPATIC FUNCTION PANEL: CPT | Performed by: INTERNAL MEDICINE

## 2021-03-05 PROCEDURE — 84443 ASSAY THYROID STIM HORMONE: CPT | Performed by: INTERNAL MEDICINE

## 2021-03-05 PROCEDURE — 36415 COLL VENOUS BLD VENIPUNCTURE: CPT | Performed by: INTERNAL MEDICINE

## 2021-03-05 PROCEDURE — 84439 ASSAY OF FREE THYROXINE: CPT | Performed by: INTERNAL MEDICINE

## 2021-03-05 RX ORDER — VALACYCLOVIR HYDROCHLORIDE 1 G/1
1000 TABLET, FILM COATED ORAL 3 TIMES DAILY
Qty: 21 TABLET | Refills: 0 | Status: SHIPPED | OUTPATIENT
Start: 2021-03-05 | End: 2024-08-29

## 2021-03-05 ASSESSMENT — MIFFLIN-ST. JEOR: SCORE: 1464.65

## 2021-03-05 NOTE — PROGRESS NOTES
"    Assessment & Plan     Herpes zoster without complication  Discussed rx best during 24-48 hr window of symptom onset though will rx today given ongoing rash and clinical presentation  Tylenol or ibuprofen prn as instructed on OTC packaging for pain relief  Consider shingles vaccine in future (wait 3 months after this episode)  Discussed contagious nature of infectious and practicing good hygiene   - valACYclovir (VALTREX) 1000 mg tablet  Dispense: 21 tablet; Refill: 0    Type 2 diabetes mellitus without complication, without long-term current use of insulin (H)  Compliant w/ metformin  Update labs  Recommend q3-6 month follow ups  - Hemoglobin A1c    Hypothyroidism, unspecified type  Compliant with new dosage, overdue for labs. Recheck  - TSH with free T4 reflex    Elevated LFTs  Recheck  - Hepatic panel (Albumin, ALT, AST, Bili, Alk Phos, TP)    Return in about 3 months (around 6/5/2021) for Follow up, with me, in person, sooner if symptoms worsen or do not improve.    Korina Vera DO  [facility] SHAAN Goldman is a 55 year old who presents for the following health issues     HPI       Rash to R torso since Monday.   Concerned about shingles, has not had vaccine yet. No hx of shingles.    Review of Systems   Constitutional, HEENT, cardiovascular, pulmonary, gi and gu systems are negative, except as otherwise noted.      Objective    BP (!) 131/93 (BP Location: Left arm, Patient Position: Sitting)   Pulse 84   Temp 97.3  F (36.3  C) (Temporal)   Ht 1.575 m (5' 2.01\")   Wt 91.6 kg (202 lb)   SpO2 100%   BMI 36.94 kg/m    Body mass index is 36.94 kg/m .  Physical Exam     GENERAL APPEARANCE: AAOx3, no distress. Well developed.    SKIN: vesicular erythematous lesions to R t8/t9 dermatome R flank/lateral rib cage area. Few spots to anterior aspect as well but mostly clustered to R lateral torso; no drainage or open lesions; no crusting    PSYCH: appropriate mood and affect.           "

## 2021-03-06 LAB
ALBUMIN SERPL-MCNC: 4 G/DL (ref 3.4–5)
ALP SERPL-CCNC: 97 U/L (ref 40–150)
ALT SERPL W P-5'-P-CCNC: 54 U/L (ref 0–50)
AST SERPL W P-5'-P-CCNC: 27 U/L (ref 0–45)
BILIRUB DIRECT SERPL-MCNC: 0.2 MG/DL (ref 0–0.2)
BILIRUB SERPL-MCNC: 0.6 MG/DL (ref 0.2–1.3)
PROT SERPL-MCNC: 7.5 G/DL (ref 6.8–8.8)
T4 FREE SERPL-MCNC: 1.57 NG/DL (ref 0.76–1.46)
TSH SERPL DL<=0.005 MIU/L-ACNC: <0.01 MU/L (ref 0.4–4)

## 2021-03-08 RX ORDER — LEVOTHYROXINE SODIUM 150 UG/1
150 TABLET ORAL DAILY
Qty: 90 TABLET | Refills: 0 | Status: SHIPPED | OUTPATIENT
Start: 2021-03-08 | End: 2021-06-14

## 2021-03-09 ENCOUNTER — TELEPHONE (OUTPATIENT)
Dept: FAMILY MEDICINE | Facility: CLINIC | Age: 56
End: 2021-03-09

## 2021-03-10 NOTE — TELEPHONE ENCOUNTER
Levothyroxine dose was decreased recently on 03/08/2021 based off of lab results. Patient is now currently taking 150 mcg instead of 175.    Dean Pereira CMA on 3/10/2021 at 6:56 AM

## 2021-04-01 NOTE — TELEPHONE ENCOUNTER
Diabetes Education Scheduling Outreach #2:    Call to patient to schedule. Left message with phone number to call to schedule.    Blanca Puga  [facility]  Diabetes and Nutrition Scheduling

## 2021-04-05 ENCOUNTER — HOSPITAL ENCOUNTER (OUTPATIENT)
Dept: ULTRASOUND IMAGING | Facility: CLINIC | Age: 56
Discharge: HOME | End: 2021-04-05
Attending: INTERNAL MEDICINE | Admitting: INTERNAL MEDICINE
Payer: COMMERCIAL

## 2021-04-05 DIAGNOSIS — R79.89 ELEVATED LFTS: ICD-10-CM

## 2021-04-05 PROBLEM — K76.0 FATTY LIVER: Chronic | Status: ACTIVE | Noted: 2021-04-05

## 2021-04-05 PROBLEM — K76.0 FATTY LIVER: Status: ACTIVE | Noted: 2021-04-05

## 2021-04-05 PROCEDURE — 76705 ECHO EXAM OF ABDOMEN: CPT

## 2021-06-12 DIAGNOSIS — E03.9 HYPOTHYROIDISM, UNSPECIFIED TYPE: ICD-10-CM

## 2021-06-12 NOTE — TELEPHONE ENCOUNTER
Levothyroxine 150 mcg tablet    Summary: Take 1 tablet (150 mcg) by mouth daily, Disp-90 tablet, R-0, E-Prescribe   Dose, Route, Frequency: 150 mcg, Oral, DAILY  Start: 3/8/2021  Ord/Sold: 3/8/2021

## 2021-06-14 RX ORDER — LEVOTHYROXINE SODIUM 150 UG/1
150 TABLET ORAL DAILY
Qty: 30 TABLET | Refills: 0 | Status: SHIPPED | OUTPATIENT
Start: 2021-06-14 | End: 2021-07-13

## 2021-06-14 NOTE — TELEPHONE ENCOUNTER
Temp 30 day prescription approved. Please let patient know she needs appt with me due to abnormal labs last time

## 2021-06-18 NOTE — TELEPHONE ENCOUNTER
TC: Can you please call patient to schedule.    Enoc Gaspar RN  [facility]

## 2021-07-13 ENCOUNTER — OFFICE VISIT (OUTPATIENT)
Dept: FAMILY MEDICINE | Facility: CLINIC | Age: 56
End: 2021-07-13
Payer: COMMERCIAL

## 2021-07-13 VITALS
WEIGHT: 204 LBS | OXYGEN SATURATION: 94 % | TEMPERATURE: 98.2 F | BODY MASS INDEX: 37.3 KG/M2 | SYSTOLIC BLOOD PRESSURE: 125 MMHG | DIASTOLIC BLOOD PRESSURE: 88 MMHG | HEART RATE: 78 BPM

## 2021-07-13 DIAGNOSIS — I10 ESSENTIAL HYPERTENSION: ICD-10-CM

## 2021-07-13 DIAGNOSIS — E03.9 HYPOTHYROIDISM, UNSPECIFIED TYPE: Primary | ICD-10-CM

## 2021-07-13 DIAGNOSIS — R79.89 ELEVATED LFTS: ICD-10-CM

## 2021-07-13 DIAGNOSIS — E11.9 TYPE 2 DIABETES MELLITUS WITHOUT COMPLICATION, WITHOUT LONG-TERM CURRENT USE OF INSULIN (H): Chronic | ICD-10-CM

## 2021-07-13 DIAGNOSIS — E78.5 HYPERLIPIDEMIA, UNSPECIFIED HYPERLIPIDEMIA TYPE: ICD-10-CM

## 2021-07-13 LAB
ALBUMIN SERPL-MCNC: 4.1 G/DL (ref 3.4–5)
ALP SERPL-CCNC: 82 U/L (ref 40–150)
ALT SERPL W P-5'-P-CCNC: 38 U/L
ANION GAP SERPL CALCULATED.3IONS-SCNC: 8 MMOL/L (ref 3–14)
AST SERPL W P-5'-P-CCNC: 19 U/L (ref 0–45)
BILIRUB SERPL-MCNC: 0.6 MG/DL (ref 0.2–1.3)
BUN SERPL-MCNC: 19 MG/DL (ref 7–30)
CALCIUM SERPL-MCNC: 9.3 MG/DL (ref 8.5–10.1)
CHLORIDE BLD-SCNC: 106 MMOL/L
CHOLEST SERPL-MCNC: 145 MG/DL
CO2 SERPL-SCNC: 24 MMOL/L (ref 20–32)
CREAT SERPL-MCNC: 0.86 MG/DL
CREAT UR-MCNC: 129 MG/DL
FASTING STATUS PATIENT QL REPORTED: YES
GFR SERPL CREATININE-BSD FRML MDRD: 76 ML/MIN/1.73M2
GGT SERPL-CCNC: 66 U/L
GLUCOSE BLD-MCNC: 143 MG/DL (ref 70–99)
HBA1C MFR BLD: 6.5 % (ref 0–5.6)
HDLC SERPL-MCNC: 34 MG/DL
LDLC SERPL CALC-MCNC: 46 MG/DL
MICROALBUMIN UR-MCNC: 10 MG/DL
MICROALBUMIN/CREAT UR: 7.75 MG/G CR (ref 0–25)
NONHDLC SERPL-MCNC: 111 MG/DL
POTASSIUM BLD-SCNC: 4.2 MMOL/L (ref 3.4–5.3)
PROT SERPL-MCNC: 7 G/DL (ref 6.8–8.8)
SODIUM SERPL-SCNC: 138 MMOL/L (ref 133–144)
T4 FREE SERPL-MCNC: 1.56 NG/DL (ref 0.76–1.46)
TRIGL SERPL-MCNC: 325 MG/DL
TSH SERPL DL<=0.005 MIU/L-ACNC: 0.08 MU/L (ref 0.4–4)

## 2021-07-13 PROCEDURE — 99214 OFFICE O/P EST MOD 30 MIN: CPT | Performed by: INTERNAL MEDICINE

## 2021-07-13 PROCEDURE — 84439 ASSAY OF FREE THYROXINE: CPT | Performed by: INTERNAL MEDICINE

## 2021-07-13 PROCEDURE — 84443 ASSAY THYROID STIM HORMONE: CPT | Performed by: INTERNAL MEDICINE

## 2021-07-13 PROCEDURE — 82977 ASSAY OF GGT: CPT | Performed by: INTERNAL MEDICINE

## 2021-07-13 PROCEDURE — 80061 LIPID PANEL: CPT | Performed by: INTERNAL MEDICINE

## 2021-07-13 PROCEDURE — 80053 COMPREHEN METABOLIC PANEL: CPT | Performed by: INTERNAL MEDICINE

## 2021-07-13 PROCEDURE — 36415 COLL VENOUS BLD VENIPUNCTURE: CPT | Performed by: INTERNAL MEDICINE

## 2021-07-13 PROCEDURE — 83036 HEMOGLOBIN GLYCOSYLATED A1C: CPT | Performed by: INTERNAL MEDICINE

## 2021-07-13 PROCEDURE — 82043 UR ALBUMIN QUANTITATIVE: CPT | Performed by: INTERNAL MEDICINE

## 2021-07-13 RX ORDER — ROSUVASTATIN CALCIUM 20 MG/1
20 TABLET, COATED ORAL AT BEDTIME
Qty: 90 TABLET | Refills: 3 | Status: SHIPPED | OUTPATIENT
Start: 2021-07-13 | End: 2022-08-05

## 2021-07-13 RX ORDER — LEVOTHYROXINE SODIUM 125 UG/1
125 TABLET ORAL DAILY
Qty: 90 TABLET | Refills: 1 | Status: SHIPPED | OUTPATIENT
Start: 2021-07-13 | End: 2021-12-13

## 2021-07-13 RX ORDER — LEVOTHYROXINE SODIUM 150 UG/1
150 TABLET ORAL DAILY
Qty: 90 TABLET | Refills: 3 | Status: SHIPPED | OUTPATIENT
Start: 2021-07-13 | End: 2021-07-13

## 2021-07-13 RX ORDER — TRIAMTERENE AND HYDROCHLOROTHIAZIDE 37.5; 25 MG/1; MG/1
CAPSULE ORAL
Qty: 90 CAPSULE | Refills: 3 | Status: SHIPPED | OUTPATIENT
Start: 2021-07-13 | End: 2022-08-05

## 2021-07-13 NOTE — PROGRESS NOTES
Assessment & Plan     Hypothyroidism, unspecified type  recheck  pnt prefers to establish with endo for ongoing management, referral placed  - levothyroxine (SYNTHROID/LEVOTHROID) 150 MCG tablet  Dispense: 90 tablet; Refill: 3  - Adult Endocrinology Referral  - TSH with free T4 reflex    Essential hypertension  controlled  - triamterene-HCTZ (DYAZIDE) 37.5-25 MG capsule  Dispense: 90 capsule; Refill: 3    Type 2 diabetes mellitus without complication, without long-term current use of insulin (H)  Update labs given recent med adjustments  Declines pneumovax  She prefers to see endo for ongoing management, referral provided  - Albumin Random Urine Quantitative with Creat Ratio  - metFORMIN (GLUCOPHAGE) 500 MG tablet  Dispense: 360 tablet; Refill: 1  - Adult Endocrinology Referral  - Hemoglobin A1c    Hyperlipidemia, unspecified hyperlipidemia type  On statin, recheck  - rosuvastatin (CRESTOR) 20 MG tablet  Dispense: 90 tablet; Refill: 3  - Lipid panel reflex to direct LDL Fasting    Elevated LFTs  Stable on labs. US with fatty liver. Discussed in detail. Discussed healthy, sustainable lifestyle modifications. She admits she needs more time but will be working toward this. We discussed risks of fatty liver.  Recheck labs today to ensure stability.   - Comprehensive metabolic panel  - GGT      Return in about 3 months (around 10/13/2021) for Follow up, with me, in person, sooner if symptoms worsen or do not improve.    Korina Vera DO  [facility] SHAAN Waite is a 55 year old who presents for the following health issues     HPI     Diabetes Follow-up      How often are you checking your blood sugar? Not at all    What concerns do you have today about your diabetes? None     Do you have any of these symptoms? (Select all that apply)  No numbness or tingling in feet.  No redness, sores or blisters on feet.  No complaints of excessive thirst.  No reports of blurry vision.  No significant  changes to weight.    Have you had a diabetic eye exam in the last 12 months? No        BP Readings from Last 2 Encounters:   07/13/21 125/88   03/05/21 (!) 131/93     Hemoglobin A1C (%)   Date Value   03/05/2021 6.6 (H)   11/24/2020 6.8 (H)     LDL Cholesterol Calculated (mg/dL)   Date Value   11/24/2020 68   09/14/2018 110 (A)       Hypothyroidism Follow-up      Since last visit, patient describes the following symptoms: Weight stable, no hair loss, no skin changes, no constipation, no loose stools      How many servings of fruits and vegetables do you eat daily?  4 or more    On average, how many sweetened beverages do you drink each day (Examples: soda, juice, sweet tea, etc.  Do NOT count diet or artificially sweetened beverages)?   0    How many days per week do you exercise enough to make your heart beat faster? 5    How many minutes a day do you exercise enough to make your heart beat faster? 30 - 60    How many days per week do you miss taking your medication? 0    Following up on abnormal labs last visit: thyroid/diabetes/elevated lft. She admits working toward weight loss but needs more time. She is compliant with meds, refills requested. She requested endo referral. No acute concerns today.    Review of Systems   Constitutional, HEENT, cardiovascular, pulmonary, gi and gu systems are negative, except as otherwise noted.      Objective    /88 (BP Location: Left arm, Cuff Size: Adult Large)   Pulse 78   Temp 98.2  F (36.8  C) (Tympanic)   Wt 92.5 kg (204 lb)   SpO2 94%   BMI 37.30 kg/m    Body mass index is 37.3 kg/m .  Physical Exam     GENERAL APPEARANCE: AAOx3, no distress. Well developed    PSYCH: appropriate mood and affect.

## 2021-09-25 ENCOUNTER — HEALTH MAINTENANCE LETTER (OUTPATIENT)
Age: 56
End: 2021-09-25

## 2021-10-06 ENCOUNTER — VIRTUAL VISIT (OUTPATIENT)
Dept: ENDOCRINOLOGY | Facility: CLINIC | Age: 56
End: 2021-10-06
Attending: INTERNAL MEDICINE
Payer: COMMERCIAL

## 2021-10-06 DIAGNOSIS — E03.9 HYPOTHYROIDISM, UNSPECIFIED TYPE: ICD-10-CM

## 2021-10-06 DIAGNOSIS — E11.9 TYPE 2 DIABETES MELLITUS WITHOUT COMPLICATION, WITHOUT LONG-TERM CURRENT USE OF INSULIN (H): Chronic | ICD-10-CM

## 2021-10-06 PROCEDURE — 99204 OFFICE O/P NEW MOD 45 MIN: CPT | Mod: GT | Performed by: INTERNAL MEDICINE

## 2021-10-06 NOTE — LETTER
10/6/2021         RE: Susi Rossi  [address]  [facility] [id number]        Dear Colleague,    Thank you for referring your patient, Susi Rossi, to the [facility]. Please see a copy of my visit note below.    Patient is being evaluated via a billable video visit.      How would you like to obtain your AVS? Reviewed verbally  If the video visit is dropped, the invitation should be resent by cell phone  Will anyone else be joining your video visit? no      Video Start Time:  8:30 am     Video-Visit Details    Type of service:  Video Visit    Video End Time:  8:55 am    Originating Location (pt. Location): home    Distant Location (provider location):  [facility]/home    Platform used for Video Visit:  [employer]      Name: Susi Rossi is a 55 year old woman, seen at the request of Dr. Korina Vera for evaluation of     Chief Complaint:  Hypothyroidism, diabetes    HPI:  Recent issues:  Here for evaluation of abnormal thyroid levels, diabetes mellitus  Reviewed medical history from patient and Epic chart record        Thyroid:  ~2006. Initial diagnosis of hypothyroidism   Had OBGYN for infertility evaluation with Dr. Kovacs/[facility]   Started levothyroxine medication  Typically taking levothyroxine 0.15 mg most often  No known history of thyroid nodules  FamHx thyroid disease: Sister- hypothyroidism    Had seen Dr. DANITZA Todd/[facility]  Subsequently saw Dr. Korina Vera/[facility]  Dose reductions with levothyroxine medication.  7/2021. Dose reduction levothyroxine to 0.125 mg daily  Has felt less anxious and tired on that dose    Previous [facility] thyroid tests include:     Lab Test 07/13/21  0934 03/05/21  1148 11/24/20  0913 09/14/18  0000 04/02/18  1030   TSH 0.08* <0.01* 0.04* 3.590 2.05   T4 1.56* 1.57* 1.68*  --   --      Recent [facility] labs include:  Lab Results   Component Value Date    TSH 0.08  (L) 07/13/2021    T4 1.56 (H) 07/13/2021     Current dose:  Levothyroxine 0.125 mg daily       Diabetes:  History of pre-diabetes  Took metformin during her infertility management with [facility], took for 1.5-2 yrs ~2006 Fall 2020. Diagnosis of diabetes mellitus  ?? 7/2021. Restarted metformin med use  Dose titrated to 500 mg 2-tabs BID, tolerates well   FamHx DM:  none    Previous [facility] hgbA1c trends include:     Lab Test 07/13/21  0934 03/05/21  1148 11/24/20  0913 03/29/19  0943 09/14/18  0000   A1C 6.5* 6.6* 6.8* 6.0* 6.0*     Current DM medications:  Metformin 500 mg  2-tabs in morning and evening.    Blood glucose (BG) meter:  none   Hasn't done any testing  Recent [facility] labs include:  Lab Results   Component Value Date    A1C 6.5 (H) 07/13/2021     07/13/2021    POTASSIUM 4.2 07/13/2021    CHLORIDE 106 07/13/2021    CO2 24 07/13/2021    ANIONGAP 8 07/13/2021     (H) 07/13/2021    BUN 19 07/13/2021    CR 0.86 07/13/2021    GFRESTIMATED 76 07/13/2021    GFRESTBLACK >90 11/24/2020    MICKY 9.3 07/13/2021    CHOL 145 07/13/2021    TRIG 325 (H) 07/13/2021    HDL 34 (L) 07/13/2021    LDL 46 07/13/2021    NHDL 111 07/13/2021    UCRR 129 07/13/2021    MICROL 10 07/13/2021    UMALCR 7.75 07/13/2021    TSH 0.08 (L) 07/13/2021    T4 1.56 (H) 07/13/2021     DM Complications: none      Lives in [place], [place], 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 Korina Vera/[facility] for general medicine evaluations.  Also sees Dr. Kovacs/[facility]    PMH/PSH:  Past Medical History:   Diagnosis Date     Acquired hypothyroidism      Benign essential hypertension      Cholecystitis with cholelithiasis 05/05/2014     Gallstones      Hyperlipidemia      Type 2 diabetes mellitus without complication, without long-term current use of insulin (H)      Past Surgical History:   Procedure Laterality Date     LAPAROSCOPIC CHOLECYSTECTOMY  5/6/2014    Procedure: LAPAROSCOPIC CHOLECYSTECTOMY;  Surgeon: Hollis  Chay Stock MD;  Location:  OR       Family Hx:  Family History   Problem Relation Age of Onset     Hyperlipidemia Father      Family History Negative Mother          MVA      Breast Cancer Maternal Grandmother          Social Hx:  Social History     Socioeconomic History     Marital status:      Spouse name: Not on file     Number of children: Not on file     Years of education: Not on file     Highest education level: Not on file   Occupational History     Not on file   Tobacco Use     Smoking status: Never Smoker     Smokeless tobacco: Never Used   Substance and Sexual Activity     Alcohol use: Yes     Alcohol/week: 0.0 standard drinks     Comment: rare     Drug use: No     Sexual activity: Yes     Partners: Male   Other Topics Concern     Parent/sibling w/ CABG, MI or angioplasty before 65F 55M? Not Asked   Social History Narrative     Not on file     Social Determinants of Health     Financial Resource Strain:      Difficulty of Paying Living Expenses:    Food Insecurity:      Worried About Running Out of Food in the Last Year:      Ran Out of Food in the Last Year:    Transportation Needs:      Lack of Transportation (Medical):      Lack of Transportation (Non-Medical):    Physical Activity:      Days of Exercise per Week:      Minutes of Exercise per Session:    Stress:      Feeling of Stress :    Social Connections:      Frequency of Communication with Friends and Family:      Frequency of Social Gatherings with Friends and Family:      Attends Bahai Services:      Active Member of Clubs or Organizations:      Attends Club or Organization Meetings:      Marital Status:    Intimate Partner Violence:      Fear of Current or Ex-Partner:      Emotionally Abused:      Physically Abused:      Sexually Abused:           MEDICATIONS:  has a current medication list which includes the following prescription(s): aspirin, cholecalciferol, fexofenadine, fluticasone, levothyroxine, metformin,  multivitamin w/minerals, rosuvastatin, triamterene-hctz, blood pressure monitoring, montelukast, and valacyclovir.    ROS:     ROS: 10 point ROS neg other than the symptoms noted above in the HPI.    GENERAL: some fatigue, wt stable vs gain; denies fevers, chills, night sweats.   HEENT: no dysphagia, odonophagia, diplopia, neck pain  THYROID:  no apparent hyper or hypothyroid symptoms  CV: no chest pain, pressure, palpitations  LUNGS: no SOB, BAKER, cough, wheezing   ABDOMEN: no diarrhea, constipation, abdominal pain  EXTREMITIES: no rashes, ulcers, edema  NEUROLOGY: no headaches, denies changes in vision, tingling, extremitiy numbness   MSK: arthralgias at hands, knees; denies muscle weakness  SKIN: no rashes or lesions  :  No menses since age 54  PSYCH:  stable mood, no significant anxiety or depression  ENDOCRINE: no heat or cold intolerance    Physical Exam (visual exam)  VS:  no vital signs taken for video visit  CONSTITUTIONAL: healthy, alert and NAD, well dressed, answering questions appropriately  ENT: no nose swelling or nasal discharge, mouth redness or gum changes.  EYES: eyes grossly normal to inspection, conjunctivae and sclerae normal, no exophthalmos or proptosis  THYROID:  no apparent nodules or goiter  LUNGS: no audible wheeze, cough or visible cyanosis, no visible retractions or increased work of breathing  ABDOMEN: abdomen not evaluated  EXTREMITIES: no hand tremors, limited exam  NEUROLOGY: CN grossly intact, mentation intact and speech normal   SKIN:  no apparent skin lesions, rash, or edema with visualized skin appearance  PSYCH: mentation appears normal, affect normal/bright, judgement and insight intact,   normal speech and appearance well groomed      LABS:    All pertinent notes, labs, and images personally reviewed by me.     A/P:  Encounter Diagnoses   Name Primary?     Hypothyroidism, unspecified type      Type 2 diabetes mellitus without complication, without long-term current use of  insulin (H)        Comments:  Reviewed health history, thyroid and diabetes issues.  Needs repeat thyroid testing to assess thyroid levels with levothyroxine treatment  Repeat hgbA1c and may need addition of another T2DM medication if persistent hyperglycemia    Plan:  Discussed general issues with the hypothyroidism diagnosis and management  Reviewed the normal thyroid gland anatomy and hormone physiology  Discussed lab tests used to assess patient thyroid hormone levels  Reviewed use of thyroid medication for hypothyroidism treatment    Discussed general issues with the diabetes diagnosis and management  Reviewed the pathophysiology of T2DM  We discussed the hgbA1c test which reflects previous overall glucose levels or control  Discussed the importance of blood glucose (BG) testing to assess glucose trends  Provided general overview of the diabetes medication options and medication treatment plan.    Recommend:   Continue the current levothyroxine daily dose plan, take daily on empty stomach  Repeat thyroid tests soon to reassess levels and medication dosing  Consider additional levothyroxine dose titration to normalize thyroid levels  No thyroid U/S imaging needed at this time    Continue current metformin twice daily dose plan  Goal target hgbA1c <7%... it may be  Plan non-fasting lab appt soon   Lab orders placed  Needs to see [facility] CDE for diabetes overview   Review nutrition plan, BG meter use   Diab Education Referral placed  If persistent high glucose levels and/or hgbA1c, consider adding GLP1RA, DPP4-I or SGLT-I med  Keep focus on diet, exercise, weight management.  Advise having fasting lipid panel testing and dilated eye examination, at least annually    Addressed patient questions today     There are no Patient Instructions on file for this visit.    Future labs ordered today:   Orders Placed This Encounter   Procedures     T4 free     TSH     Thyroid peroxidase antibody     Hemoglobin A1c      Radiology/Consults ordered today:     Total time spent in with the patient evaluation:  25 min  Additional time spent reviewing pertinent lab tests and chart notes, and documentation:  20 min    Follow-up:  ~2/2022    JEROME Durham MD, MS  Endocrinology  [facility]    CC: Korina Vera       Again, thank you for allowing me to participate in the care of your patient.        Sincerely,        Pascual Durham MD

## 2021-10-06 NOTE — PROGRESS NOTES
Patient is being evaluated via a billable video visit.      How would you like to obtain your AVS? Reviewed verbally  If the video visit is dropped, the invitation should be resent by cell phone  Will anyone else be joining your video visit? no      Video Start Time:  8:30 am     Video-Visit Details    Type of service:  Video Visit    Video End Time:  8:55 am    Originating Location (pt. Location): home    Distant Location (provider location):  [facility]/[place]    Platform used for Video Visit:  [employer]      Name: uSsi Rossi is a 55 year old woman, seen at the request of Dr. Korina Vera for evaluation of     Chief Complaint:  Hypothyroidism, diabetes    HPI:  Recent issues:  Here for evaluation of abnormal thyroid levels, diabetes mellitus  Reviewed medical history from patient and Epic chart record        Thyroid:  ~2006. Initial diagnosis of hypothyroidism   Had OBGYN for infertility evaluation with Dr. Kovacs/I   Started levothyroxine medication  Typically taking levothyroxine 0.15 mg most often  No known history of thyroid nodules  FamHx thyroid disease: Sister- hypothyroidism    Had [facility] Dr. DANITZA [facility]  Subsequently saw Dr. Korina Vera/[facility]  Dose reductions with levothyroxine medication.  7/2021. Dose reduction levothyroxine to 0.125 mg daily  Has felt less anxious and tired on that dose    Previous [facility] thyroid tests include:     Lab Test 07/13/21  0934 03/05/21  1148 11/24/20  0913 09/14/18  0000 04/02/18  1030   TSH 0.08* <0.01* 0.04* 3.590 2.05   T4 1.56* 1.57* 1.68*  --   --      Recent [facility] labs include:  Lab Results   Component Value Date    TSH 0.08 (L) 07/13/2021    T4 1.56 (H) 07/13/2021     Current dose:  Levothyroxine 0.125 mg daily       Diabetes:  History of pre-diabetes  Took metformin during her infertility management with OBGYN, took for 1.5-2 yrs ~2006 Fall 2020. Diagnosis of diabetes mellitus  ?? 7/2021.  Restarted metformin med use  Dose titrated to 500 mg 2-tabs BID, tolerates well   FamHx DM:  none    Previous [facility] hgbA1c trends include:     Lab Test 21  0934 21  1148 20  0913 19  0943 18  0000   A1C 6.5* 6.6* 6.8* 6.0* 6.0*     Current DM medications:  Metformin 500 mg  2-tabs in morning and evening.    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 Korina Vera/[facility] for general medicine evaluations.  Also sees Dr. Kovacs/[facility]    PMH/PSH:  Past Medical History:   Diagnosis Date     Acquired hypothyroidism      Benign essential hypertension      Cholecystitis with cholelithiasis 2014     Gallstones      Hyperlipidemia      Type 2 diabetes mellitus without complication, without long-term current use of insulin (H)      Past Surgical History:   Procedure Laterality Date     LAPAROSCOPIC CHOLECYSTECTOMY  2014    Procedure: LAPAROSCOPIC CHOLECYSTECTOMY;  Surgeon: Chay Shafer MD;  Location:  OR       Family Hx:  Family History   Problem Relation Age of Onset     Hyperlipidemia Father      Family History Negative Mother          MVA      Breast Cancer Maternal Grandmother          Social Hx:  Social History      Socioeconomic History     Marital status:      Spouse name: Not on file     Number of children: Not on file     Years of education: Not on file     Highest education level: Not on file   Occupational History     Not on file   Tobacco Use     Smoking status: Never Smoker     Smokeless tobacco: Never Used   Substance and Sexual Activity     Alcohol use: Yes     Alcohol/week: 0.0 standard drinks     Comment: rare     Drug use: No     Sexual activity: Yes     Partners: Male   Other Topics Concern     Parent/sibling w/ CABG, MI or angioplasty before 65F 55M? Not Asked   Social History Narrative     Not on file     Social Determinants of Health     Financial Resource Strain:      Difficulty of Paying Living Expenses:    Food Insecurity:      Worried About Running Out of Food in the Last Year:      Ran Out of Food in the Last Year:    Transportation Needs:      Lack of Transportation (Medical):      Lack of Transportation (Non-Medical):    Physical Activity:      Days of Exercise per Week:      Minutes of Exercise per Session:    Stress:      Feeling of Stress :    Social Connections:      Frequency of Communication with Friends and Family:      Frequency of Social Gatherings with Friends and Family:      Attends Shinto Services:      Active Member of Clubs or Organizations:      Attends Club or Organization Meetings:      Marital Status:    Intimate Partner Violence:      Fear of Current or Ex-Partner:      Emotionally Abused:      Physically Abused:      Sexually Abused:           MEDICATIONS:  has a current medication list which includes the following prescription(s): aspirin, cholecalciferol, fexofenadine, fluticasone, levothyroxine, metformin, multivitamin w/minerals, rosuvastatin, triamterene-hctz, blood pressure monitoring, montelukast, and valacyclovir.    ROS:     ROS: 10 point ROS neg other than the symptoms noted above in the HPI.    GENERAL: some fatigue, wt stable vs gain; denies fevers, chills,  night sweats.   HEENT: no dysphagia, odonophagia, diplopia, neck pain  THYROID:  no apparent hyper or hypothyroid symptoms  CV: no chest pain, pressure, palpitations  LUNGS: no SOB, BAKER, cough, wheezing   ABDOMEN: no diarrhea, constipation, abdominal pain  EXTREMITIES: no rashes, ulcers, edema  NEUROLOGY: no headaches, denies changes in vision, tingling, extremitiy numbness   MSK: arthralgias at hands, knees; denies muscle weakness  SKIN: no rashes or lesions  :  No menses since age 54  PSYCH:  stable mood, no significant anxiety or depression  ENDOCRINE: no heat or cold intolerance    Physical Exam (visual exam)  VS:  no vital signs taken for video visit  CONSTITUTIONAL: healthy, alert and NAD, well dressed, answering questions appropriately  ENT: no nose swelling or nasal discharge, mouth redness or gum changes.  EYES: eyes grossly normal to inspection, conjunctivae and sclerae normal, no exophthalmos or proptosis  THYROID:  no apparent nodules or goiter  LUNGS: no audible wheeze, cough or visible cyanosis, no visible retractions or increased work of breathing  ABDOMEN: abdomen not evaluated  EXTREMITIES: no hand tremors, limited exam  NEUROLOGY: CN grossly intact, mentation intact and speech normal   SKIN:  no apparent skin lesions, rash, or edema with visualized skin appearance  PSYCH: mentation appears normal, affect normal/bright, judgement and insight intact,   normal speech and appearance well groomed      LABS:    All pertinent notes, labs, and images personally reviewed by me.     A/P:  Encounter Diagnoses   Name Primary?     Hypothyroidism, unspecified type      Type 2 diabetes mellitus without complication, without long-term current use of insulin (H)        Comments:  Reviewed health history, thyroid and diabetes issues.  Needs repeat thyroid testing to assess thyroid levels with levothyroxine treatment  Repeat hgbA1c and may need addition of another T2DM medication if persistent  hyperglycemia    Plan:  Discussed general issues with the hypothyroidism diagnosis and management  Reviewed the normal thyroid gland anatomy and hormone physiology  Discussed lab tests used to assess patient thyroid hormone levels  Reviewed use of thyroid medication for hypothyroidism treatment    Discussed general issues with the diabetes diagnosis and management  Reviewed the pathophysiology of T2DM  We discussed the hgbA1c test which reflects previous overall glucose levels or control  Discussed the importance of blood glucose (BG) testing to assess glucose trends  Provided general overview of the diabetes medication options and medication treatment plan.    Recommend:   Continue the current levothyroxine daily dose plan, take daily on empty stomach  Repeat thyroid tests soon to reassess levels and medication dosing  Consider additional levothyroxine dose titration to normalize thyroid levels  No thyroid U/S imaging needed at this time    Continue current metformin twice daily dose plan  Goal target hgbA1c <7%... it may be  Plan non-fasting lab appt soon   Lab orders placed  Needs to see [facility] CDE for diabetes overview   Review nutrition plan, BG meter use   Diab Education Referral placed  If persistent high glucose levels and/or hgbA1c, consider adding GLP1RA, DPP4-I or SGLT-I med  Keep focus on diet, exercise, weight management.  Advise having fasting lipid panel testing and dilated eye examination, at least annually    Addressed patient questions today     There are no Patient Instructions on file for this visit.    Future labs ordered today:   Orders Placed This Encounter   Procedures     T4 free     TSH     Thyroid peroxidase antibody     Hemoglobin A1c     Radiology/Consults ordered today:     Total time spent in with the patient evaluation:  25 min  Additional time spent reviewing pertinent lab tests and chart notes, and documentation:  20 min    Follow-up:  ~2/2022    JEROME Durham MD, MS  Endocrinology  M  [facility]    CC: Korina Vera

## 2021-10-06 NOTE — Clinical Note
Thanks for thyroid & diabetes consult, though the diabetes management not very complicated.  Best wishes. TL

## 2021-11-01 ENCOUNTER — TELEPHONE (OUTPATIENT)
Dept: ENDOCRINOLOGY | Facility: CLINIC | Age: 56
End: 2021-11-01
Payer: COMMERCIAL

## 2021-11-20 ENCOUNTER — HEALTH MAINTENANCE LETTER (OUTPATIENT)
Age: 56
End: 2021-11-20

## 2021-11-22 NOTE — TELEPHONE ENCOUNTER
left 3rd message for PT to call [phone] to schedule 4 m f/u around February with Dr. Durham.

## 2021-12-07 DIAGNOSIS — E03.9 HYPOTHYROIDISM, UNSPECIFIED TYPE: ICD-10-CM

## 2021-12-07 NOTE — TELEPHONE ENCOUNTER
LOV 10-6-2021 TL virtual, thyroid labs now, follow up TL in February 2022  See encounter 11-1-2021 3 call outs to patient to schedule follow up, patient did not return call  No future OV scheduled    30 days R0 pended  SIG/pharm note given  [employer] message sent - lab and OV needed    Griselda Mahoney, RT (R)

## 2021-12-10 ENCOUNTER — LAB (OUTPATIENT)
Dept: LAB | Facility: CLINIC | Age: 56
End: 2021-12-10
Payer: COMMERCIAL

## 2021-12-10 DIAGNOSIS — E11.9 TYPE 2 DIABETES MELLITUS WITHOUT COMPLICATION, WITHOUT LONG-TERM CURRENT USE OF INSULIN (H): Chronic | ICD-10-CM

## 2021-12-10 DIAGNOSIS — E03.9 HYPOTHYROIDISM, UNSPECIFIED TYPE: ICD-10-CM

## 2021-12-10 LAB — HBA1C MFR BLD: 6.6 % (ref 0–5.6)

## 2021-12-10 PROCEDURE — 84443 ASSAY THYROID STIM HORMONE: CPT

## 2021-12-10 PROCEDURE — 36415 COLL VENOUS BLD VENIPUNCTURE: CPT

## 2021-12-10 PROCEDURE — 83036 HEMOGLOBIN GLYCOSYLATED A1C: CPT

## 2021-12-10 PROCEDURE — 86376 MICROSOMAL ANTIBODY EACH: CPT

## 2021-12-10 PROCEDURE — 84439 ASSAY OF FREE THYROXINE: CPT

## 2021-12-10 RX ORDER — LEVOTHYROXINE SODIUM 125 UG/1
125 TABLET ORAL DAILY
Qty: 30 TABLET | Refills: 0 | Status: CANCELLED | OUTPATIENT
Start: 2021-12-10

## 2021-12-10 NOTE — TELEPHONE ENCOUNTER
2nd request from pharmacy    [employer]hart request sent to patient on 12/7/2021; patient read on 12/8/2021 and has taken no action as of yet.    Griselda Mahoney RT (R)

## 2021-12-13 DIAGNOSIS — E03.9 HYPOTHYROIDISM, UNSPECIFIED TYPE: ICD-10-CM

## 2021-12-13 LAB
T4 FREE SERPL-MCNC: 1.19 NG/DL (ref 0.76–1.46)
THYROPEROXIDASE AB SERPL-ACNC: 101 IU/ML
TSH SERPL DL<=0.005 MIU/L-ACNC: 4.26 MU/L (ref 0.4–4)

## 2021-12-13 RX ORDER — LEVOTHYROXINE SODIUM 137 UG/1
137 TABLET ORAL DAILY
Qty: 90 TABLET | Refills: 1 | Status: SHIPPED | OUTPATIENT
Start: 2021-12-13 | End: 2022-01-18

## 2021-12-14 NOTE — TELEPHONE ENCOUNTER
Pt is wondering if there is any additional reason her levels could be fluctuating?Pt wondering if Biotin would interfere she would like to start taking this . She does take an apple cider vinegar . Discuss and reviewed how she is taking the Levothyroxine and she is following all the recommendations.Mary Jane Pfeiffer RN

## 2021-12-14 NOTE — TELEPHONE ENCOUNTER
Mariann:     I reviewed the recent [facility] lab tests.     Your lab results showed normal Free T4 and mildly elevated TSH levels. The thyroid peroxidase antibody was elevated. The hgbA1c was 6.6% (goal < 7%). You can access your lab results through the [facility].     I recommend dose increase with the thyroid medication as levothyroxine 0.137 mg daily, updated Rx sent to your local pharmacy.  Continue the current metformin medication dose plan. Contact me if questions and arrange a follow-up endocrinology evaluation in mid-late 1/2022.     JEROME Durham MD, MS  Endocrinology  [facility]

## 2021-12-15 NOTE — TELEPHONE ENCOUNTER
Message noted.  I am not aware of any alternative reasons patient's thyroid tests abnormal... assuming she has been taking her thyroid medication daily on an empty stomach.  She should discontinue Biotin supplements.  Continue thyroid medication dosing plan as noted in the [employer] message sent to her recently.    Please relay feedback, thanks.    JEROME Durham MD, MS  Endocrinology  [facility]

## 2022-01-15 ENCOUNTER — HEALTH MAINTENANCE LETTER (OUTPATIENT)
Age: 57
End: 2022-01-15

## 2022-01-18 ENCOUNTER — VIRTUAL VISIT (OUTPATIENT)
Dept: ENDOCRINOLOGY | Facility: CLINIC | Age: 57
End: 2022-01-18
Payer: COMMERCIAL

## 2022-01-18 DIAGNOSIS — E11.9 TYPE 2 DIABETES MELLITUS WITHOUT COMPLICATION, WITHOUT LONG-TERM CURRENT USE OF INSULIN (H): Chronic | ICD-10-CM

## 2022-01-18 DIAGNOSIS — E55.9 VITAMIN D DEFICIENCY: ICD-10-CM

## 2022-01-18 DIAGNOSIS — E06.3 HYPOTHYROIDISM DUE TO HASHIMOTO'S THYROIDITIS: Primary | ICD-10-CM

## 2022-01-18 PROCEDURE — 99214 OFFICE O/P EST MOD 30 MIN: CPT | Mod: 95 | Performed by: INTERNAL MEDICINE

## 2022-01-18 RX ORDER — LEVOTHYROXINE SODIUM 137 UG/1
137 TABLET ORAL DAILY
Qty: 90 TABLET | Refills: 1 | Status: SHIPPED | OUTPATIENT
Start: 2022-01-18 | End: 2022-08-04

## 2022-01-18 NOTE — PROGRESS NOTES
Patient is being evaluated via a billable video visit.      How would you like to obtain your AVS? Reviewed verbally  If the video visit is dropped, the invitation should be resent by cell phone  Will anyone else be joining your video visit? no      Video Start Time: 8:09 am    Video-Visit Details    Type of service:  Video Visit    Video End Time: 8:25 am    Originating Location (pt. Location): home    Distant Location (provider location):  [facility] [place]    Platform used for Video Visit:  [employer]        Recent issues:  Hypothyroidism and diabetes follow-up evaluation  Had lab tests in 12/2021 with symptoms of fatigue symptom, then dose increase with levothyroxine med  Reviewed medical history from patient and Epic chart record        Thyroid:  ~2006. Initial diagnosis of hypothyroidism   Had OBGYN for infertility evaluation with Dr. Kovacs/[facility]   Started levothyroxine medication  Typically taking levothyroxine 0.15 mg most often  No known history of thyroid nodules  FamHx thyroid disease: Sister- hypothyroidism    Had seen Dr. DANITZA Todd/[facility] clinic  Subsequently saw Dr. Korina Vera/[facility] clinic  Dose reductions with levothyroxine medication.  7/2021. Dose reduction levothyroxine to 0.125 mg daily  Has felt less anxious and tired on that dose    Previous [facility] thyroid tests include:     Lab Test 07/13/21  0934 03/05/21  1148 11/24/20  0913 09/14/18  0000 04/02/18  1030   TSH 0.08* <0.01* 0.04* 3.590 2.05   T4 1.56* 1.57* 1.68*  --   --          10/6/21. Initial endocrinology evaluation with me at [place]  Reviewed health history, thyroid and diabetes endocrine issues  Lab testing, then dose increase of levothyroxine medication  Recent [facility] labs include:  Lab Results   Component Value Date    TSH 4.26 (H) 12/10/2021    T4 1.19 12/10/2021     (H) 12/10/2021     Current dose:  Levothyroxine 0.137 mg daily       Diabetes:  History of pre-diabetes  Took metformin during her  infertility management with OBGYN, took for 1.5-2 yrs ~2006 Fall 2020. Diagnosis of diabetes mellitus  ?? 7/2021. Restarted metformin med use  11/2020. Labs showed elevated hgbA1c 6.8% indicating T2DM  Metformin dose changed as 500 mg 2-tabs BID, tolerates well     FamHx DM:  none  Previous [facility] hgbA1c trends include:     Lab Test 07/13/21  0934 03/05/21  1148 11/24/20  0913 03/29/19  0943 09/14/18  0000   A1C 6.5* 6.6* 6.8* 6.0* 6.0*     Current DM medications:  Metformin 500 mg  2-tabs in morning and evening.    Blood glucose (BG) meter:  none   Hasn't done any testing  Recent [facility] labs include:  Lab Results   Component Value Date    A1C 6.6 (H) 12/10/2021     07/13/2021    POTASSIUM 4.2 07/13/2021    CHLORIDE 106 07/13/2021    CO2 24 07/13/2021    ANIONGAP 8 07/13/2021     (H) 07/13/2021    BUN 19 07/13/2021    CR 0.86 07/13/2021    GFRESTIMATED 76 07/13/2021    GFRESTBLACK >90 11/24/2020    MICKY 9.3 07/13/2021    CHOL 145 07/13/2021    TRIG 325 (H) 07/13/2021    HDL 34 (L) 07/13/2021    LDL 46 07/13/2021    NHDL 111 07/13/2021    UCRR 129 07/13/2021    MICROL 10 07/13/2021    UMALCR 7.75 07/13/2021    TSH 4.26 (H) 12/10/2021    T4 1.19 12/10/2021     DM Complications: none      Lives in [place], [place], 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 Korina Vera/[facility] for general medicine evaluations.  Also sees Dr. Kovacs/[facility]    PMH/PSH:  Past Medical History:   Diagnosis Date     Acquired hypothyroidism      Benign essential hypertension      Cholecystitis with cholelithiasis 05/05/2014     Gallstones      Hyperlipidemia      Type 2 diabetes mellitus without complication, without long-term current use of insulin (H)      Past Surgical History:   Procedure Laterality Date     LAPAROSCOPIC CHOLECYSTECTOMY  5/6/2014    Procedure: LAPAROSCOPIC CHOLECYSTECTOMY;  Surgeon: Chay Shafer MD;  Location: [facility] OR       Family Hx:  Family History   Problem Relation Age  of Onset     Hyperlipidemia Father      Family History Negative Mother          MVA      Breast Cancer Maternal Grandmother          Social Hx:  Social History     Socioeconomic History     Marital status:      Spouse name: Not on file     Number of children: Not on file     Years of education: Not on file     Highest education level: Not on file   Occupational History     Not on file   Tobacco Use     Smoking status: Never Smoker     Smokeless tobacco: Never Used   Substance and Sexual Activity     Alcohol use: Yes     Alcohol/week: 0.0 standard drinks     Comment: rare     Drug use: No     Sexual activity: Yes     Partners: Male   Other Topics Concern     Parent/sibling w/ CABG, MI or angioplasty before 65F 55M? Not Asked   Social History Narrative     Not on file     Social Determinants of Health     Financial Resource Strain: Not on file   Food Insecurity: Not on file   Transportation Needs: Not on file   Physical Activity: Not on file   Stress: Not on file   Social Connections: Not on file   Intimate Partner Violence: Not on file   Housing Stability: Not on file          MEDICATIONS:  has a current medication list which includes the following prescription(s): aspirin, cholecalciferol, fexofenadine, fluticasone, levothyroxine, metformin, montelukast, multivitamin w/minerals, rosuvastatin, triamterene-hctz, valacyclovir, and blood pressure monitoring.    ROS:     ROS: 10 point ROS neg other than the symptoms noted above in the HPI.    GENERAL: some fatigue, wt stable; denies fevers, chills, night sweats.   HEENT: no dysphagia, odonophagia, diplopia, neck pain  THYROID:  no apparent hyper or hypothyroid symptoms  CV: no chest pain, pressure, palpitations  LUNGS: no SOB, BAKER, cough, wheezing   ABDOMEN: no diarrhea, constipation, abdominal pain  EXTREMITIES: no rashes, ulcers, edema  NEUROLOGY: no headaches, denies changes in vision, tingling, extremitiy numbness   MSK: arthralgias at hands, knees; denies  muscle weakness  SKIN: no rashes or lesions  :  no menses since age 54  PSYCH:  stable mood, no significant anxiety or depression  ENDOCRINE: no heat or cold intolerance    Physical Exam (visual exam)  VS:  no vital signs taken for video visit  CONSTITUTIONAL: healthy, alert and NAD, well dressed, answering questions appropriately  ENT: no nose swelling or nasal discharge, mouth redness or gum changes.  EYES: eyes grossly normal to inspection, conjunctivae and sclerae normal, no exophthalmos or proptosis  THYROID:  no apparent nodules or goiter  LUNGS: no audible wheeze, cough or visible cyanosis, no visible retractions or increased work of breathing  ABDOMEN: abdomen not evaluated  EXTREMITIES: no hand tremors, limited exam  NEUROLOGY: CN grossly intact, mentation intact and speech normal   SKIN:  no apparent skin lesions, rash, or edema with visualized skin appearance  PSYCH: mentation appears normal, affect normal/bright, judgement and insight intact,   normal speech and appearance well groomed      LABS:    All pertinent notes, labs, and images personally reviewed by me.     A/P:  Encounter Diagnoses   Name Primary?     Hypothyroidism due to Hashimoto's thyroiditis Yes     Type 2 diabetes mellitus without complication, without long-term current use of insulin (H)      Vitamin D deficiency        Comments:  Reviewed health history, thyroid and diabetes issues.  Needs repeat thyroid testing to assess thyroid levels with levothyroxine treatment  Repeat hgbA1c and may need addition of another T2DM medication if persistent hyperglycemia  Reviewed and interpreted tests that I previously ordered.   Ordered appropriate tests for the endocrinology disease management.    Management options discussed and implemented after shared medical decision making with the patient.  Hypothyroidism and T2DM problems are chronic-stable    Plan:  Discussed general issues with the hypothyroidism diagnosis and management  Reviewed the  normal thyroid gland anatomy and hormone physiology  Discussed lab tests used to assess patient thyroid hormone levels  Reviewed use of thyroid medication for hypothyroidism treatment    Discussed general issues with the diabetes diagnosis and management  We discussed the hgbA1c test which reflects previous overall glucose levels or control  Discussed the importance of blood glucose (BG) testing to assess glucose trends  Provided general overview of the diabetes medication options and medication treatment plan.    Recommend:   Continue the current levothyroxine 0.137 mg daily dose plan, take daily on empty stomach  Consider additional levothyroxine dose titration to normalize thyroid levels  No thyroid U/S imaging needed at this time    Continue current metformin twice daily dose plan  Goal target hgbA1c <7%  Plan non-fasting labs in 2/2022   Lab orders placed  Needs to see [facility] dietician- CDE for diabetes overview   She would like to focus on her diet plan, wt loss   Also provide T2DM overview and teach BG meter use   Diab Education Referral placed  If future high glucose levels and/or hgbA1c, consider adding GLP1RA, DPP4-I or SGLT-I med  Keep focus on diet, exercise, weight management.  Advise having fasting lipid panel testing and dilated eye examination, at least annually    Check with OBGYN physician regarding f/u DEXA imaging plan  Addressed patient questions today     There are no Patient Instructions on file for this visit.    Future labs ordered today:   Orders Placed This Encounter   Procedures     T4 free     TSH     Basic metabolic panel     Vitamin D Deficiency     AMB Adult Diabetes Educator Referral     Radiology/Consults ordered today: AMBULATORY ADULT DIABETES EDUCATOR REFERRAL    Total time spent in with the patient evaluation:  16 min  Additional time spent reviewing pertinent lab tests and chart notes, and documentation:  10 min    Follow-up:  7/2022    JEROME Durham MD, MS  Endocrinology  [facility]  [facility]    CC: Korina Vera

## 2022-01-18 NOTE — LETTER
1/18/2022         RE: Susi Rossi  [address] [place] [address]        Dear Colleague,    Thank you for referring your patient, Susi Rossi, to the [facility]. Please see a copy of my visit note below.    Patient is being evaluated via a billable video visit.      How would you like to obtain your AVS? Reviewed verbally  If the video visit is dropped, the invitation should be resent by cell phone  Will anyone else be joining your video visit? no      Video Start Time: 8:09 am    Video-Visit Details    Type of service:  Video Visit    Video End Time: 8:25 am    Originating Location (pt. Location): home    Distant Location (provider location):  [facility]/home    Platform used for Video Visit:  [email or web address]        Recent issues:  Hypothyroidism and diabetes follow-up evaluation  Had lab tests in 12/2021 with symptoms of fatigue symptom, then dose increase with levothyroxine med  Reviewed medical history from patient and Epic chart record        Thyroid:  ~2006. Initial diagnosis of hypothyroidism   Had OBGYN for infertility evaluation with Dr. Kovacs/[facility]   Started levothyroxine medication  Typically taking levothyroxine 0.15 mg most often  No known history of thyroid nodules  FamHx thyroid disease: Sister- hypothyroidism    Had seen Dr. DANITZA Todd/[facility]  Subsequently saw Dr. Korina Vera/[facility]  Dose reductions with levothyroxine medication.  7/2021. Dose reduction levothyroxine to 0.125 mg daily  Has felt less anxious and tired on that dose    Previous  thyroid tests include:     Lab Test 07/13/21  0934 03/05/21  1148 11/24/20  0913 09/14/18  0000 04/02/18  1030   TSH 0.08* <0.01* 0.04* 3.590 2.05   T4 1.56* 1.57* 1.68*  --   --          10/6/21. Initial endocrinology evaluation with me at [place]  Reviewed health history, thyroid and diabetes endocrine issues  Lab testing, then dose increase of  levothyroxine medication  Recent [facility] labs include:  Lab Results   Component Value Date    TSH 4.26 (H) 12/10/2021    T4 1.19 12/10/2021     (H) 12/10/2021     Current dose:  Levothyroxine 0.137 mg daily       Diabetes:  History of pre-diabetes  Took metformin during her infertility management with [facility], took for 1.5-2 yrs ~2006 Fall 2020. Diagnosis of diabetes mellitus  ?? 7/2021. Restarted metformin med use  11/2020. Labs showed elevated hgbA1c 6.8% indicating T2DM  Metformin dose changed as 500 mg 2-tabs BID, tolerates well     FamHx DM:  none  Previous [facility] hgbA1c trends include:     Lab Test 07/13/21  0934 03/05/21  1148 11/24/20  0913 03/29/19  0943 09/14/18  0000   A1C 6.5* 6.6* 6.8* 6.0* 6.0*     Current DM medications:  Metformin 500 mg  2-tabs in morning and evening.    Blood glucose (BG) meter:  none   Hasn't done any testing  Recent [facility] labs include:  Lab Results   Component Value Date    A1C 6.6 (H) 12/10/2021     07/13/2021    POTASSIUM 4.2 07/13/2021    CHLORIDE 106 07/13/2021    CO2 24 07/13/2021    ANIONGAP 8 07/13/2021     (H) 07/13/2021    BUN 19 07/13/2021    CR 0.86 07/13/2021    GFRESTIMATED 76 07/13/2021    GFRESTBLACK >90 11/24/2020    MICKY 9.3 07/13/2021    CHOL 145 07/13/2021    TRIG 325 (H) 07/13/2021    HDL 34 (L) 07/13/2021    LDL 46 07/13/2021    NHDL 111 07/13/2021    UCRR 129 07/13/2021    MICROL 10 07/13/2021    UMALCR 7.75 07/13/2021    TSH 4.26 (H) 12/10/2021    T4 1.19 12/10/2021     DM Complications: none      Lives in [place], [place], 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 Korina Vera/[facility] for general medicine evaluations.  Also sees Dr. Kovacs/[facility]    PMH/PSH:  Past Medical History:   Diagnosis Date     Acquired hypothyroidism      Benign essential hypertension      Cholecystitis with cholelithiasis 05/05/2014     Gallstones      Hyperlipidemia      Type 2 diabetes mellitus without complication, without long-term  current use of insulin (H)      Past Surgical History:   Procedure Laterality Date     LAPAROSCOPIC CHOLECYSTECTOMY  2014    Procedure: LAPAROSCOPIC CHOLECYSTECTOMY;  Surgeon: Chay Shafer MD;  Location: [facility] OR       Family Hx:  Family History   Problem Relation Age of Onset     Hyperlipidemia Father      Family History Negative Mother          MVA      Breast Cancer Maternal Grandmother          Social Hx:  Social History     Socioeconomic History     Marital status:      Spouse name: Not on file     Number of children: Not on file     Years of education: Not on file     Highest education level: Not on file   Occupational History     Not on file   Tobacco Use     Smoking status: Never Smoker     Smokeless tobacco: Never Used   Substance and Sexual Activity     Alcohol use: Yes     Alcohol/week: 0.0 standard drinks     Comment: rare     Drug use: No     Sexual activity: Yes     Partners: Male   Other Topics Concern     Parent/sibling w/ CABG, MI or angioplasty before 65F 55M? Not Asked   Social History Narrative     Not on file     Social Determinants of Health     Financial Resource Strain: Not on file   Food Insecurity: Not on file   Transportation Needs: Not on file   Physical Activity: Not on file   Stress: Not on file   Social Connections: Not on file   Intimate Partner Violence: Not on file   Housing Stability: Not on file          MEDICATIONS:  has a current medication list which includes the following prescription(s): aspirin, cholecalciferol, fexofenadine, fluticasone, levothyroxine, metformin, montelukast, multivitamin w/minerals, rosuvastatin, triamterene-hctz, valacyclovir, and blood pressure monitoring.    ROS:     ROS: 10 point ROS neg other than the symptoms noted above in the HPI.    GENERAL: some fatigue, wt stable; denies fevers, chills, night sweats.   HEENT: no dysphagia, odonophagia, diplopia, neck pain  THYROID:  no apparent hyper or hypothyroid symptoms  CV: no chest  pain, pressure, palpitations  LUNGS: no SOB, BAKER, cough, wheezing   ABDOMEN: no diarrhea, constipation, abdominal pain  EXTREMITIES: no rashes, ulcers, edema  NEUROLOGY: no headaches, denies changes in vision, tingling, extremitiy numbness   MSK: arthralgias at hands, knees; denies muscle weakness  SKIN: no rashes or lesions  :  no menses since age 54  PSYCH:  stable mood, no significant anxiety or depression  ENDOCRINE: no heat or cold intolerance    Physical Exam (visual exam)  VS:  no vital signs taken for video visit  CONSTITUTIONAL: healthy, alert and NAD, well dressed, answering questions appropriately  ENT: no nose swelling or nasal discharge, mouth redness or gum changes.  EYES: eyes grossly normal to inspection, conjunctivae and sclerae normal, no exophthalmos or proptosis  THYROID:  no apparent nodules or goiter  LUNGS: no audible wheeze, cough or visible cyanosis, no visible retractions or increased work of breathing  ABDOMEN: abdomen not evaluated  EXTREMITIES: no hand tremors, limited exam  NEUROLOGY: CN grossly intact, mentation intact and speech normal   SKIN:  no apparent skin lesions, rash, or edema with visualized skin appearance  PSYCH: mentation appears normal, affect normal/bright, judgement and insight intact,   normal speech and appearance well groomed      LABS:    All pertinent notes, labs, and images personally reviewed by me.     A/P:  Encounter Diagnoses   Name Primary?     Hypothyroidism due to Hashimoto's thyroiditis Yes     Type 2 diabetes mellitus without complication, without long-term current use of insulin (H)      Vitamin D deficiency        Comments:  Reviewed health history, thyroid and diabetes issues.  Needs repeat thyroid testing to assess thyroid levels with levothyroxine treatment  Repeat hgbA1c and may need addition of another T2DM medication if persistent hyperglycemia  Reviewed and interpreted tests that I previously ordered.   Ordered appropriate tests for the  endocrinology disease management.    Management options discussed and implemented after shared medical decision making with the patient.  Hypothyroidism and T2DM problems are chronic-stable    Plan:  Discussed general issues with the hypothyroidism diagnosis and management  Reviewed the normal thyroid gland anatomy and hormone physiology  Discussed lab tests used to assess patient thyroid hormone levels  Reviewed use of thyroid medication for hypothyroidism treatment    Discussed general issues with the diabetes diagnosis and management  We discussed the hgbA1c test which reflects previous overall glucose levels or control  Discussed the importance of blood glucose (BG) testing to assess glucose trends  Provided general overview of the diabetes medication options and medication treatment plan.    Recommend:   Continue the current levothyroxine 0.137 mg daily dose plan, take daily on empty stomach  Consider additional levothyroxine dose titration to normalize thyroid levels  No thyroid U/S imaging needed at this time    Continue current metformin twice daily dose plan  Goal target hgbA1c <7%  Plan non-fasting labs in 2/2022   Lab orders placed  Needs to see [facility] dietician- CDE for diabetes overview   She would like to focus on her diet plan, wt loss   Also provide T2DM overview and teach BG meter use   Diab Education Referral placed  If future high glucose levels and/or hgbA1c, consider adding GLP1RA, DPP4-I or SGLT-I med  Keep focus on diet, exercise, weight management.  Advise having fasting lipid panel testing and dilated eye examination, at least annually    Check with OBGYN physician regarding f/u DEXA imaging plan  Addressed patient questions today     There are no Patient Instructions on file for this visit.    Future labs ordered today:   Orders Placed This Encounter   Procedures     T4 free     TSH     Basic metabolic panel     Vitamin D Deficiency     AMB Adult Diabetes Educator Referral      Radiology/Consults ordered today: AMBULATORY ADULT DIABETES EDUCATOR REFERRAL    Total time spent in with the patient evaluation:  16 min  Additional time spent reviewing pertinent lab tests and chart notes, and documentation:  10 min    Follow-up:  7/2022    JEROME Durham MD, MS  Endocrinology  [facility]    CC: Korina Vera       Again, thank you for allowing me to participate in the care of your patient.        Sincerely,        Pascual Durham MD

## 2022-01-25 ENCOUNTER — TELEPHONE (OUTPATIENT)
Dept: ENDOCRINOLOGY | Facility: CLINIC | Age: 57
End: 2022-01-25
Payer: COMMERCIAL

## 2022-01-25 NOTE — TELEPHONE ENCOUNTER
Diabetes Education Scheduling Outreach #1:    Call to patient to schedule. Left message with phone number to call to schedule.    Plan for 2nd outreach attempt within 1 week.    Blanca Puga  [facility]  Diabetes and Nutrition Scheduling

## 2022-02-01 NOTE — TELEPHONE ENCOUNTER
Diabetes Education Scheduling Outreach #2:    Call to patient to schedule. Left message with phone number to call to schedule.    Blanca Puga  [facility]  Diabetes and Nutrition Scheduling

## 2022-02-10 ENCOUNTER — TELEPHONE (OUTPATIENT)
Dept: ENDOCRINOLOGY | Facility: CLINIC | Age: 57
End: 2022-02-10
Payer: COMMERCIAL

## 2022-03-12 ENCOUNTER — HEALTH MAINTENANCE LETTER (OUTPATIENT)
Age: 57
End: 2022-03-12

## 2022-07-02 ENCOUNTER — HEALTH MAINTENANCE LETTER (OUTPATIENT)
Age: 57
End: 2022-07-02

## 2022-08-04 DIAGNOSIS — E78.5 HYPERLIPIDEMIA, UNSPECIFIED HYPERLIPIDEMIA TYPE: ICD-10-CM

## 2022-08-04 DIAGNOSIS — I10 ESSENTIAL HYPERTENSION: ICD-10-CM

## 2022-08-04 DIAGNOSIS — E06.3 HYPOTHYROIDISM DUE TO HASHIMOTO'S THYROIDITIS: ICD-10-CM

## 2022-08-04 RX ORDER — LEVOTHYROXINE SODIUM 137 UG/1
137 TABLET ORAL DAILY
Qty: 90 TABLET | Refills: 0 | Status: SHIPPED | OUTPATIENT
Start: 2022-08-04 | End: 2022-10-14

## 2022-08-04 NOTE — TELEPHONE ENCOUNTER
"Last Written Prescription Date:  1/18/22  Last Fill Quantity: 90,  # refills: 1   Last office visit: 1/18/22 with Dr. Durham  Future Office Visit: Was due back 7/22      Requested Prescriptions   Pending Prescriptions Disp Refills     levothyroxine (SYNTHROID/LEVOTHROID) 137 MCG tablet 90 tablet 1     Sig: Take 1 tablet (137 mcg) by mouth daily       Thyroid Protocol Failed - 8/4/2022  8:46 AM        Failed - Normal TSH on file in past 12 months     Recent Labs   Lab Test 12/10/21  1735   TSH 4.26*              Passed - Patient is 12 years or older        Passed - Recent (12 mo) or future (30 days) visit within the authorizing provider's specialty     Patient has had an office visit with the authorizing provider or a provider within the authorizing providers department within the previous 12 mos or has a future within next 30 days. See \"Patient Info\" tab in inbasket, or \"Choose Columns\" in Meds & Orders section of the refill encounter.              Passed - Medication is active on med list        Passed - No active pregnancy on record     If patient is pregnant or has had a positive pregnancy test, please check TSH.          Passed - No positive pregnancy test in past 12 months     If patient is pregnant or has had a positive pregnancy test, please check TSH.             REFILL SENT FOR 90 DAYS WITH REMINDER THAT APPT IS DUE.  WILL SEND [employer] MSG TO REMIND PT TO SCHEDULE APPT.  Ceci Lopez RN    "

## 2022-08-05 RX ORDER — TRIAMTERENE AND HYDROCHLOROTHIAZIDE 37.5; 25 MG/1; MG/1
CAPSULE ORAL
Qty: 30 CAPSULE | Refills: 0 | Status: SHIPPED | OUTPATIENT
Start: 2022-08-05 | End: 2022-09-02

## 2022-08-05 RX ORDER — ROSUVASTATIN CALCIUM 20 MG/1
20 TABLET, COATED ORAL AT BEDTIME
Qty: 30 TABLET | Refills: 0 | Status: SHIPPED | OUTPATIENT
Start: 2022-08-05 | End: 2022-09-02

## 2022-08-05 NOTE — TELEPHONE ENCOUNTER
Routing refill request to provider for review/approval because:  Labs not current:    LDL Cholesterol Calculated   Date Value Ref Range Status   07/13/2021 46 <=100 mg/dL Final     Comment:     Age 0-19 years:  Desirable: 0-110 mg/dL   Borderline high: 110-129 mg/dL   High: >= 130 mg/dL    Age 20 years and older:  Desirable: <100mg/dL  Above desirable: 100-129 mg/dL   Borderline high: 130-159 mg/dL   High: 160-189 mg/dL   Very high: >= 190 mg/dL   11/24/2020 68 <100 mg/dL Final     Comment:     Desirable:       <100 mg/dl     Creatinine   Date Value Ref Range Status   07/13/2021 0.86 mg/dL Final   11/24/2020 0.81 0.52 - 1.04 mg/dL Final     Potassium   Date Value Ref Range Status   07/13/2021 4.2 3.4 - 5.3 mmol/L Final   11/24/2020 3.8 3.4 - 5.3 mmol/L Final     Sodium   Date Value Ref Range Status   07/13/2021 138 133 - 144 mmol/L Final   11/24/2020 139 133 - 144 mmol/L Final     BP out of date   BP Readings from Last 3 Encounters:   07/13/21 125/88   03/05/21 (!) 131/93   11/24/20 115/81     Pt is due for a visit with PCP.

## 2022-08-31 DIAGNOSIS — I10 ESSENTIAL HYPERTENSION: ICD-10-CM

## 2022-08-31 DIAGNOSIS — E78.5 HYPERLIPIDEMIA, UNSPECIFIED HYPERLIPIDEMIA TYPE: ICD-10-CM

## 2022-09-02 RX ORDER — ROSUVASTATIN CALCIUM 20 MG/1
20 TABLET, COATED ORAL AT BEDTIME
Qty: 30 TABLET | Refills: 0 | Status: SHIPPED | OUTPATIENT
Start: 2022-09-02 | End: 2022-10-03

## 2022-09-02 RX ORDER — TRIAMTERENE AND HYDROCHLOROTHIAZIDE 37.5; 25 MG/1; MG/1
CAPSULE ORAL
Qty: 30 CAPSULE | Refills: 0 | Status: SHIPPED | OUTPATIENT
Start: 2022-09-02 | End: 2022-10-03

## 2022-09-02 NOTE — TELEPHONE ENCOUNTER
Routing refill request to provider for review/approval because:  Labs out of range  Labs not current     Milady ARAUJO RN  EP Triage

## 2022-09-28 DIAGNOSIS — E78.5 HYPERLIPIDEMIA, UNSPECIFIED HYPERLIPIDEMIA TYPE: ICD-10-CM

## 2022-09-28 DIAGNOSIS — I10 ESSENTIAL HYPERTENSION: ICD-10-CM

## 2022-10-03 RX ORDER — ROSUVASTATIN CALCIUM 20 MG/1
20 TABLET, COATED ORAL AT BEDTIME
Qty: 30 TABLET | Refills: 0 | Status: SHIPPED | OUTPATIENT
Start: 2022-10-03 | End: 2022-10-17

## 2022-10-03 RX ORDER — TRIAMTERENE AND HYDROCHLOROTHIAZIDE 37.5; 25 MG/1; MG/1
CAPSULE ORAL
Qty: 30 CAPSULE | Refills: 0 | Status: SHIPPED | OUTPATIENT
Start: 2022-10-03 | End: 2022-10-17

## 2022-10-03 NOTE — TELEPHONE ENCOUNTER
Routing refill request to provider for review/approval because:  Patient needs to be seen because it has been more than 1 year since last office visit.    BP Readings from Last 3 Encounters:   07/13/21 125/88   03/05/21 (!) 131/93   11/24/20 115/81     Creatinine   Date Value Ref Range Status   07/13/2021 0.86 mg/dL Final   11/24/2020 0.81 0.52 - 1.04 mg/dL Final     Potassium   Date Value Ref Range Status   07/13/2021 4.2 3.4 - 5.3 mmol/L Final   11/24/2020 3.8 3.4 - 5.3 mmol/L Final     Sodium   Date Value Ref Range Status   07/13/2021 138 133 - 144 mmol/L Final   11/24/2020 139 133 - 144 mmol/L Final     Mary Natarajan RN

## 2022-10-12 ENCOUNTER — HOSPITAL ENCOUNTER (EMERGENCY)
Facility: CLINIC | Age: 57
Discharge: HOME | End: 2022-10-12
Payer: COMMERCIAL

## 2022-10-12 VITALS
DIASTOLIC BLOOD PRESSURE: 95 MMHG | HEART RATE: 65 BPM | SYSTOLIC BLOOD PRESSURE: 165 MMHG | RESPIRATION RATE: 16 BRPM | OXYGEN SATURATION: 98 % | TEMPERATURE: 97.6 F

## 2022-10-13 DIAGNOSIS — E06.3 HYPOTHYROIDISM DUE TO HASHIMOTO'S THYROIDITIS: ICD-10-CM

## 2022-10-13 DIAGNOSIS — I10 ESSENTIAL HYPERTENSION: ICD-10-CM

## 2022-10-13 DIAGNOSIS — E78.5 HYPERLIPIDEMIA, UNSPECIFIED HYPERLIPIDEMIA TYPE: ICD-10-CM
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
## 2022-10-14 RX ORDER — LEVOTHYROXINE SODIUM 137 UG/1
TABLET ORAL
Qty: 30 TABLET | Refills: 0 | Status: SHIPPED | OUTPATIENT
Start: 2022-10-14 | End: 2022-12-09

## 2022-10-14 NOTE — TELEPHONE ENCOUNTER
"Last Written Prescription Date: 8/4/22  Last Fill Quantity: 90,  # refills: 0   Last office visit: 1/18/22 with Dr. Durham  Future Office Visit:  Due 7/22        Requested Prescriptions   Pending Prescriptions Disp Refills     levothyroxine (SYNTHROID/LEVOTHROID) 137 MCG tablet [Pharmacy Med Name: LEVOTHYROXINE 137 MCG TABLET] 30 tablet 2     Sig: TAKE 1 TABLET BY MOUTH EVERY DAY       Thyroid Protocol Failed - 10/13/2022 12:41 PM        Failed - Normal TSH on file in past 12 months     Recent Labs   Lab Test 12/10/21  1735   TSH 4.26*              Passed - Patient is 12 years or older        Passed - Recent (12 mo) or future (30 days) visit within the authorizing provider's specialty     Patient has had an office visit with the authorizing provider or a provider within the authorizing providers department within the previous 12 mos or has a future within next 30 days. See \"Patient Info\" tab in inbasket, or \"Choose Columns\" in Meds & Orders section of the refill encounter.              Passed - Medication is active on med list        Passed - No active pregnancy on record     If patient is pregnant or has had a positive pregnancy test, please check TSH.          Passed - No positive pregnancy test in past 12 months     If patient is pregnant or has had a positive pregnancy test, please check TSH.             1 mo refill sent with note to pharmacy that pt needs appt for further refills.  Ceci Lopez RN    "

## 2022-10-17 RX ORDER — ROSUVASTATIN CALCIUM 20 MG/1
20 TABLET, COATED ORAL AT BEDTIME
Qty: 30 TABLET | Refills: 0 | Status: SHIPPED | OUTPATIENT
Start: 2022-10-17 | End: 2022-11-22

## 2022-10-17 RX ORDER — TRIAMTERENE AND HYDROCHLOROTHIAZIDE 37.5; 25 MG/1; MG/1
CAPSULE ORAL
Qty: 30 CAPSULE | Refills: 0 | Status: SHIPPED | OUTPATIENT
Start: 2022-10-17 | End: 2022-11-22

## 2022-10-17 NOTE — TELEPHONE ENCOUNTER
Routing refill request to provider for review/approval because:  Labs out of range/Labs not current:   LDL Cholesterol Calculated   Date Value Ref Range Status   07/13/2021 46 <=100 mg/dL Final     Comment:     Age 0-19 years:  Desirable: 0-110 mg/dL   Borderline high: 110-129 mg/dL   High: >= 130 mg/dL    Age 20 years and older:  Desirable: <100mg/dL  Above desirable: 100-129 mg/dL   Borderline high: 130-159 mg/dL   High: 160-189 mg/dL   Very high: >= 190 mg/dL   11/24/2020 68 <100 mg/dL Final     Comment:     Desirable:       <100 mg/dl     Creatinine   Date Value Ref Range Status   07/13/2021 0.86 mg/dL Final   11/24/2020 0.81 0.52 - 1.04 mg/dL Final     Potassium   Date Value Ref Range Status   07/13/2021 4.2 3.4 - 5.3 mmol/L Final   11/24/2020 3.8 3.4 - 5.3 mmol/L Final     Sodium   Date Value Ref Range Status   07/13/2021 138 133 - 144 mmol/L Final   11/24/2020 139 133 - 144 mmol/L Final     BP Readings from Last 3 Encounters:   10/12/22 (!) 165/95   07/13/21 125/88   03/05/21 (!) 131/93     Patient needs to be seen because it has been more than 1 year since last office visit. LOV with PCP on 7/13/21    Nany Ruff RN

## 2022-11-21 DIAGNOSIS — E78.5 HYPERLIPIDEMIA, UNSPECIFIED HYPERLIPIDEMIA TYPE: ICD-10-CM

## 2022-11-21 DIAGNOSIS — I10 ESSENTIAL HYPERTENSION: ICD-10-CM

## 2022-11-22 RX ORDER — TRIAMTERENE AND HYDROCHLOROTHIAZIDE 37.5; 25 MG/1; MG/1
CAPSULE ORAL
Qty: 30 CAPSULE | Refills: 0 | Status: SHIPPED | OUTPATIENT
Start: 2022-11-22 | End: 2022-12-16

## 2022-11-22 RX ORDER — ROSUVASTATIN CALCIUM 20 MG/1
20 TABLET, COATED ORAL AT BEDTIME
Qty: 30 TABLET | Refills: 0 | Status: SHIPPED | OUTPATIENT
Start: 2022-11-22 | End: 2022-12-16

## 2022-11-22 NOTE — TELEPHONE ENCOUNTER
Routing refill request to provider for review/approval because:  Patient needs to be seen because it has been more than 1 year since last office visit. Patient has not read any of the [facility] messages sent requesting patient make an appointment. Routing to  for scheduling.     Rosuvastatin: Labs not current:    LDL Cholesterol Calculated   Date Value Ref Range Status   07/13/2021 46 <=100 mg/dL Final     Comment:     Age 0-19 years:  Desirable: 0-110 mg/dL   Borderline high: 110-129 mg/dL   High: >= 130 mg/dL    Age 20 years and older:  Desirable: <100mg/dL  Above desirable: 100-129 mg/dL   Borderline high: 130-159 mg/dL   High: 160-189 mg/dL   Very high: >= 190 mg/dL   11/24/2020 68 <100 mg/dL Final     Comment:     Desirable:       <100 mg/dl     Triamterene: Labs out of range and not current:  BP Readings from Last 3 Encounters:   10/12/22 (!) 165/95   07/13/21 125/88   03/05/21 (!) 131/93     Creatinine   Date Value Ref Range Status   07/13/2021 0.86 mg/dL Final   11/24/2020 0.81 0.52 - 1.04 mg/dL Final     Potassium   Date Value Ref Range Status   07/13/2021 4.2 3.4 - 5.3 mmol/L Final   11/24/2020 3.8 3.4 - 5.3 mmol/L Final     Sodium   Date Value Ref Range Status   07/13/2021 138 133 - 144 mmol/L Final   11/24/2020 139 133 - 144 mmol/L Final        Deepti Espinosa RN

## 2022-12-08 ENCOUNTER — TELEPHONE (OUTPATIENT)
Dept: ENDOCRINOLOGY | Facility: CLINIC | Age: 57
End: 2022-12-08

## 2022-12-08 DIAGNOSIS — E06.3 HYPOTHYROIDISM DUE TO HASHIMOTO'S THYROIDITIS: ICD-10-CM

## 2022-12-08 NOTE — TELEPHONE ENCOUNTER
"Last Written Prescription Date:  10/14/22  Last Fill Quantity: 30,  # refills: 0   Last office visit: 1/18/22 with Dr. Durham  Future Office Visit:  Was due 7/2022        Requested Prescriptions   Pending Prescriptions Disp Refills     levothyroxine (SYNTHROID/LEVOTHROID) 137 MCG tablet [Pharmacy Med Name: LEVOTHYROXINE 137 MCG TABLET] 30 tablet 0     Sig: TAKE 1 TABLET BY MOUTH EVERY DAY.       Thyroid Protocol Failed - 12/8/2022 12:55 AM        Failed - Normal TSH on file in past 12 months     Recent Labs   Lab Test 12/10/21  1735   TSH 4.26*              Passed - Patient is 12 years or older        Passed - Recent (12 mo) or future (30 days) visit within the authorizing provider's specialty     Patient has had an office visit with the authorizing provider or a provider within the authorizing providers department within the previous 12 mos or has a future within next 30 days. See \"Patient Info\" tab in inbasket, or \"Choose Columns\" in Meds & Orders section of the refill encounter.              Passed - Medication is active on med list        Passed - No active pregnancy on record     If patient is pregnant or has had a positive pregnancy test, please check TSH.          Passed - No positive pregnancy test in past 12 months     If patient is pregnant or has had a positive pregnancy test, please check TSH.             Refill sent for 1 mo with reminder to schedule f/u  Also sent msg to  staff to call pt to help schedule.  Ceci Lopez RN    "

## 2022-12-08 NOTE — TELEPHONE ENCOUNTER
----- Message from Ceci Lopez RN sent at 12/8/2022  9:50 AM CST -----  Please call pt to schedule appt with Dr. Durham.  She was due back 7/22, and is wanting a refill.  I already sent a refill last month with reminder to schedule appt.  Once she schedules I can send a refill. Thanks

## 2022-12-09 RX ORDER — LEVOTHYROXINE SODIUM 137 UG/1
TABLET ORAL
Qty: 30 TABLET | Refills: 0 | Status: SHIPPED | OUTPATIENT
Start: 2022-12-09 | End: 2023-01-03

## 2022-12-15 DIAGNOSIS — I10 ESSENTIAL HYPERTENSION: ICD-10-CM

## 2022-12-15 DIAGNOSIS — E78.5 HYPERLIPIDEMIA, UNSPECIFIED HYPERLIPIDEMIA TYPE: ICD-10-CM

## 2022-12-16 RX ORDER — ROSUVASTATIN CALCIUM 20 MG/1
20 TABLET, COATED ORAL AT BEDTIME
Qty: 30 TABLET | Refills: 0 | Status: SHIPPED | OUTPATIENT
Start: 2022-12-16 | End: 2023-07-05

## 2022-12-16 RX ORDER — TRIAMTERENE AND HYDROCHLOROTHIAZIDE 37.5; 25 MG/1; MG/1
CAPSULE ORAL
Qty: 30 CAPSULE | Refills: 0 | Status: SHIPPED | OUTPATIENT
Start: 2022-12-16 | End: 2023-07-05

## 2023-01-03 DIAGNOSIS — E06.3 HYPOTHYROIDISM DUE TO HASHIMOTO'S THYROIDITIS: ICD-10-CM

## 2023-01-03 RX ORDER — LEVOTHYROXINE SODIUM 137 UG/1
TABLET ORAL
Qty: 30 TABLET | Refills: 0 | Status: SHIPPED | OUTPATIENT
Start: 2023-01-03 | End: 2023-01-27

## 2023-01-03 NOTE — TELEPHONE ENCOUNTER
"Last Written Prescription Date:  12/9/22  Last Fill Quantity: 30,  # refills: 0   Last office visit: 1/18/22 with Dr. Durham  Future Office Visit:  1/31/23        Requested Prescriptions   Pending Prescriptions Disp Refills     levothyroxine (SYNTHROID/LEVOTHROID) 137 MCG tablet [Pharmacy Med Name: LEVOTHYROXINE 137 MCG TABLET] 30 tablet 0     Sig: TAKE 1 TABLET BY MOUTH EVERY DAY       Thyroid Protocol Failed - 1/3/2023 10:30 AM        Failed - Normal TSH on file in past 12 months     Recent Labs   Lab Test 12/10/21  1735   TSH 4.26*              Passed - Patient is 12 years or older        Passed - Recent (12 mo) or future (30 days) visit within the authorizing provider's specialty     Patient has had an office visit with the authorizing provider or a provider within the authorizing providers department within the previous 12 mos or has a future within next 30 days. See \"Patient Info\" tab in inbasket, or \"Choose Columns\" in Meds & Orders section of the refill encounter.              Passed - Medication is active on med list        Passed - No active pregnancy on record     If patient is pregnant or has had a positive pregnancy test, please check TSH.          Passed - No positive pregnancy test in past 12 months     If patient is pregnant or has had a positive pregnancy test, please check TSH.             1 month refill sent  Ceci Lopez RN    "

## 2023-01-07 ENCOUNTER — HEALTH MAINTENANCE LETTER (OUTPATIENT)
Age: 58
End: 2023-01-07

## 2023-01-23 ENCOUNTER — LAB (OUTPATIENT)
Dept: LAB | Facility: CLINIC | Age: 58
End: 2023-01-23
Payer: COMMERCIAL

## 2023-01-23 DIAGNOSIS — E55.9 VITAMIN D DEFICIENCY: ICD-10-CM

## 2023-01-23 DIAGNOSIS — E06.3 HYPOTHYROIDISM DUE TO HASHIMOTO'S THYROIDITIS: ICD-10-CM

## 2023-01-23 DIAGNOSIS — E11.9 TYPE 2 DIABETES MELLITUS WITHOUT COMPLICATION, WITHOUT LONG-TERM CURRENT USE OF INSULIN (H): Chronic | ICD-10-CM

## 2023-01-23 LAB
ANION GAP SERPL CALCULATED.3IONS-SCNC: 15 MMOL/L (ref 7–15)
BUN SERPL-MCNC: 16.7 MG/DL (ref 6–20)
CALCIUM SERPL-MCNC: 9.9 MG/DL (ref 8.6–10)
CHLORIDE SERPL-SCNC: 103 MMOL/L (ref 98–107)
CREAT SERPL-MCNC: 0.79 MG/DL (ref 0.51–0.95)
DEPRECATED CALCIDIOL+CALCIFEROL SERPL-MC: 74 UG/L (ref 20–75)
DEPRECATED HCO3 PLAS-SCNC: 23 MMOL/L (ref 22–29)
GFR SERPL CREATININE-BSD FRML MDRD: 87 ML/MIN/1.73M2
GLUCOSE SERPL-MCNC: 194 MG/DL (ref 70–99)
POTASSIUM SERPL-SCNC: 4.5 MMOL/L (ref 3.4–5.3)
SODIUM SERPL-SCNC: 141 MMOL/L (ref 136–145)
T4 FREE SERPL-MCNC: 1.47 NG/DL (ref 0.9–1.7)
TSH SERPL DL<=0.005 MIU/L-ACNC: 3.5 UIU/ML (ref 0.3–4.2)

## 2023-01-23 PROCEDURE — 84439 ASSAY OF FREE THYROXINE: CPT

## 2023-01-23 PROCEDURE — 82306 VITAMIN D 25 HYDROXY: CPT

## 2023-01-23 PROCEDURE — 36415 COLL VENOUS BLD VENIPUNCTURE: CPT

## 2023-01-23 PROCEDURE — 84443 ASSAY THYROID STIM HORMONE: CPT

## 2023-01-23 PROCEDURE — 80048 BASIC METABOLIC PNL TOTAL CA: CPT

## 2023-01-27 DIAGNOSIS — E06.3 HYPOTHYROIDISM DUE TO HASHIMOTO'S THYROIDITIS: ICD-10-CM

## 2023-01-27 RX ORDER — LEVOTHYROXINE SODIUM 137 UG/1
TABLET ORAL
Qty: 30 TABLET | Refills: 0 | Status: SHIPPED | OUTPATIENT
Start: 2023-01-27 | End: 2023-01-31

## 2023-01-27 NOTE — TELEPHONE ENCOUNTER
"Last Written Prescription Date:  1/3/23  Last Fill Quantity: 30,  # refills: 0   Last office visit: 1/18/22 with Dr. Durham  Future Office Visit:  1/31/23        Requested Prescriptions   Pending Prescriptions Disp Refills     levothyroxine (SYNTHROID/LEVOTHROID) 137 MCG tablet [Pharmacy Med Name: LEVOTHYROXINE 137 MCG TABLET] 30 tablet 0     Sig: TAKE 1 TABLET BY MOUTH EVERY DAY       Thyroid Protocol Passed - 1/27/2023 11:35 AM        Passed - Patient is 12 years or older        Passed - Recent (12 mo) or future (30 days) visit within the authorizing provider's specialty     Patient has had an office visit with the authorizing provider or a provider within the authorizing providers department within the previous 12 mos or has a future within next 30 days. See \"Patient Info\" tab in inbasket, or \"Choose Columns\" in Meds & Orders section of the refill encounter.              Passed - Medication is active on med list        Passed - Normal TSH on file in past 12 months     Recent Labs   Lab Test 01/23/23  0836   TSH 3.50              Passed - No active pregnancy on record     If patient is pregnant or has had a positive pregnancy test, please check TSH.          Passed - No positive pregnancy test in past 12 months     If patient is pregnant or has had a positive pregnancy test, please check TSH.             Refill sent  Ceci Lopez RN    "

## 2023-01-31 ENCOUNTER — OFFICE VISIT (OUTPATIENT)
Dept: ENDOCRINOLOGY | Facility: CLINIC | Age: 58
End: 2023-01-31
Payer: COMMERCIAL

## 2023-01-31 VITALS
BODY MASS INDEX: 37.63 KG/M2 | DIASTOLIC BLOOD PRESSURE: 80 MMHG | SYSTOLIC BLOOD PRESSURE: 116 MMHG | WEIGHT: 205.8 LBS | HEART RATE: 82 BPM

## 2023-01-31 DIAGNOSIS — E11.9 TYPE 2 DIABETES MELLITUS WITHOUT COMPLICATION, WITHOUT LONG-TERM CURRENT USE OF INSULIN (H): ICD-10-CM

## 2023-01-31 DIAGNOSIS — E06.3 HYPOTHYROIDISM DUE TO HASHIMOTO'S THYROIDITIS: Primary | ICD-10-CM

## 2023-01-31 PROCEDURE — 99214 OFFICE O/P EST MOD 30 MIN: CPT | Performed by: INTERNAL MEDICINE

## 2023-01-31 RX ORDER — LACTOBACILLUS RHAMNOSUS GG 10B CELL
1 CAPSULE ORAL 2 TIMES DAILY
COMMUNITY

## 2023-01-31 RX ORDER — LEVOTHYROXINE SODIUM 137 UG/1
137 TABLET ORAL DAILY
Qty: 90 TABLET | Refills: 3 | Status: SHIPPED | OUTPATIENT
Start: 2023-01-31 | End: 2024-02-12

## 2023-01-31 RX ORDER — MULTIVITAMIN WITH IRON
1 TABLET ORAL DAILY
COMMUNITY

## 2023-01-31 RX ORDER — CHLORAL HYDRATE 500 MG
2 CAPSULE ORAL DAILY
COMMUNITY

## 2023-01-31 NOTE — PROGRESS NOTES
Recent issues:  Hypothyroidism and diabetes follow-up evaluation  Concerns about wt management in menopause  Reviewed medical history from patient and Epic chart record        Thyroid:  ~2006. Initial diagnosis of hypothyroidism   Had OBGYN for infertility evaluation with Dr. Kovacs/MARILY[facility]   Started levothyroxine medication  Typically taking levothyroxine 0.15 mg most often  No known history of thyroid nodules  FamHx thyroid disease: Sister- hypothyroidism    Had seen Dr. DANITZA Todd/[facility]  Subsequently saw Dr. Korina Vera/[facility]  Dose reductions with levothyroxine medication.  7/2021. Dose reduction levothyroxine to 0.125 mg daily  Has felt less anxious and tired on that dose    Previous [facility] thyroid tests include:     Lab Test 07/13/21  0934 03/05/21  1148 11/24/20  0913 09/14/18  0000 04/02/18  1030   TSH 0.08* <0.01* 0.04* 3.590 2.05   T4 1.56* 1.57* 1.68*  --   --            10/6/21. Initial endocrinology evaluation with me at [place]  Reviewed health history, thyroid and diabetes endocrine issues  Lab testing, then dose increase of levothyroxine medication  Recent [facility] labs include:  Lab Results   Component Value Date    TSH 3.50 01/23/2023    T4 1.47 01/23/2023     (H) 12/10/2021     Current dose:  Levothyroxine 0.137 mg daily       Diabetes:  History of pre-diabetes  Took metformin during her infertility management with OBGYN, took for 1.5-2 yrs ~2006 Fall 2020. Diagnosis of diabetes mellitus  ?? 7/2021. Restarted metformin med use  11/2020. Labs showed elevated hgbA1c 6.8% indicating T2DM  Metformin dose changed as 500 mg 2-tabs BID, tolerates well     FamHx DM:  none  Previous [facility] hgbA1c trends include:     Lab Test 07/13/21  0934 03/05/21  1148 11/24/20  0913 03/29/19  0943 09/14/18  0000   A1C 6.5* 6.6* 6.8* 6.0* 6.0*     Current DM medications:  Metformin 500 mg  2-tabs in morning and evening.    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 Korina Vera/[facility] for general medicine evaluations.  Had seen Dr. Kovacs/[facility]      PMH/PSH:  Past Medical History:   Diagnosis Date     Acquired hypothyroidism      Benign essential hypertension      Cholecystitis with cholelithiasis 2014     Gallstones      Hyperlipidemia      Type 2 diabetes mellitus without complication, without long-term current use of insulin (H)      Past Surgical History:   Procedure Laterality Date     LAPAROSCOPIC CHOLECYSTECTOMY  2014    Procedure: LAPAROSCOPIC CHOLECYSTECTOMY;  Surgeon: Chay Shafer MD;  Location:  OR       Family Hx:  Family History   Problem Relation Age of Onset     Hyperlipidemia Father      Family History Negative Mother          MVA      Breast Cancer Maternal Grandmother          Social Hx:  Social History     Socioeconomic History     Marital status:      Spouse name: Not on file     Number of children: Not on file     Years of education: Not on file     Highest education level: Not on file   Occupational History     Not on file   Tobacco Use     Smoking status: Never     Smokeless tobacco: Never   Substance and Sexual Activity     Alcohol use: Yes     Alcohol/week: 0.0 standard drinks     Comment: rare     Drug use: No      Sexual activity: Yes     Partners: Male   Other Topics Concern     Parent/sibling w/ CABG, MI or angioplasty before 65F 55M? Not Asked   Social History Narrative     Not on file     Social Determinants of Health     Financial Resource Strain: Not on file   Food Insecurity: Not on file   Transportation Needs: Not on file   Physical Activity: Not on file   Stress: Not on file   Social Connections: Not on file   Intimate Partner Violence: Not on file   Housing Stability: Not on file          MEDICATIONS:  has a current medication list which includes the following prescription(s): aspirin, cholecalciferol, fexofenadine, fish oil-omega-3 fatty acids, fluticasone, lactobacillus rhamnosus (gg), levothyroxine, magnesium, metformin, rosuvastatin, triamterene-hctz, vitamin b complex with vitamin c, blood pressure monitoring, montelukast, multivitamin w/minerals, and valacyclovir.    ROS:     ROS: 10 point ROS neg other than the symptoms noted above in the HPI.    GENERAL: some fatigue, wt stable; denies fevers, chills, night sweats.   HEENT: no dysphagia, odonophagia, diplopia, neck pain  THYROID:  no apparent hyper or hypothyroid symptoms  CV: no chest pain, pressure, palpitations  LUNGS: no SOB, BAKER, cough, wheezing   ABDOMEN: some BM urgency; no nausea, diarrhea, constipation, abdominal pain  EXTREMITIES: no rashes, ulcers, edema  NEUROLOGY: no headaches, denies changes in vision, tingling, extremitiy numbness   MSK: arthralgias at hands, knees; denies muscle weakness  SKIN: no rashes or lesions  :  no menses since age 54  PSYCH:  stable mood, no significant anxiety or depression  ENDOCRINE: no heat or cold intolerance      Physical Exam   VS: /80   Pulse 82   Wt 93.4 kg (205 lb 12.8 oz)   BMI 37.63 kg/m    GENERAL: AXOX3, NAD, well dressed, answering questions appropriately, appears stated age.  ENT: no nose swelling or nasal discharge, mouth redness or gum changes.  EYES: eyes grossly normal to inspection,  conjunctivae and sclerae normal, no exophthalmos or proptosis  THYROID:  no apparent nodules or goiter  LUNGS: no audible wheeze, cough or visible cyanosis, or increased work of breathing  ABDOMEN: abdomen obese size  EXTREMITIES: no edema noted  NEUROLOGY: CN grossly intact, no tremors  MSK: grossly intact  SKIN:  no apparent skin lesions, rash, or edema with visualized skin appearance  PSYCH: mentation appears normal, affect normal/bright, judgement and insight intact,   normal speech and appearance well groomed    LABS:    All pertinent notes, labs, and images personally reviewed by me.     A/P:  Encounter Diagnoses   Name Primary?     Hypothyroidism due to Hashimoto's thyroiditis Yes     Type 2 diabetes mellitus without complication, without long-term current use of insulin (H)        Comments:  Reviewed health history, thyroid and diabetes issues.  Overdue for repeat hgbA1c lab testing  Reviewed and interpreted tests that I previously ordered.   Ordered appropriate tests for the endocrinology disease management.    Management options discussed and implemented after shared medical decision making with the patient.  Hypothyroidism and T2DM problems are chronic-stable    Plan:  Discussed general issues with the hypothyroidism diagnosis and management  Reviewed the normal thyroid gland anatomy and hormone physiology  Discussed lab tests used to assess patient thyroid hormone levels  Reviewed use of thyroid medication for hypothyroidism treatment    Discussed general issues with the diabetes diagnosis and management  We discussed the hgbA1c test which reflects previous overall glucose levels or control  Discussed the importance of blood glucose (BG) testing to assess glucose trends  Provided general overview of the diabetes medication options and medication treatment plan.    Recommend:   Continue the current levothyroxine 0.137 mg daily dose plan, take daily on empty stomach  Sent updated 3-mo levothyroxine Rx to  pharmacy  No thyroid U/S imaging needed at this time    Continue current metformin twice daily dose plan  Consider future med alternatives such as Victoza daily, Ozempic vs Trulicity weekly, discussed  Goal target hgbA1c <7%  Plan non-fasting labs soon   Consider testing when PCP appt soon   Lab orders placed  Consider seeing [facility] dietician- CDE for diabetes overview   She would like to focus on her diet plan, wt loss   Also provide T2DM overview and teach BG meter use  Keep focus on diet, exercise, weight management.  Advise having fasting lipid panel testing and dilated eye examination, at least annually    Check with OBGYN or PCP physician regarding f/u DEXA imaging plan  Addressed patient questions today     There are no Patient Instructions on file for this visit.    Future labs ordered today:   Orders Placed This Encounter   Procedures     Hemoglobin A1c     Basic metabolic panel     Radiology/Consults ordered today: None    Total time spent on day of encounter:  20 min    Follow-up:  1/2024, Return    JEROME Durham MD, MS  Endocrinology  [facility]    CC: Korina Vera

## 2023-01-31 NOTE — LETTER
1/31/2023         RE: Susi Rossi  [address] [place] [address]        Dear Colleague,    Thank you for referring your patient, Susi Rossi, to the [facility]. Please see a copy of my visit note below.      Recent issues:  Hypothyroidism and diabetes follow-up evaluation  Concerns about wt management in menopause  Reviewed medical history from patient and Epic chart record        Thyroid:  ~2006. Initial diagnosis of hypothyroidism   Had OBGYN for infertility evaluation with Dr. Kovacs/[facility]   Started levothyroxine medication  Typically taking levothyroxine 0.15 mg most often  No known history of thyroid nodules  FamHx thyroid disease: Sister- hypothyroidism    Had seen Dr. DANITZA Todd/[facility]  Subsequently saw Dr. Korina Vera/[facility]  Dose reductions with levothyroxine medication.  7/2021. Dose reduction levothyroxine to 0.125 mg daily  Has felt less anxious and tired on that dose    Previous [facility] thyroid tests include:     Lab Test 07/13/21  0934 03/05/21  1148 11/24/20  0913 09/14/18  0000 04/02/18  1030   TSH 0.08* <0.01* 0.04* 3.590 2.05   T4 1.56* 1.57* 1.68*  --   --            10/6/21. Initial endocrinology evaluation with me at [place]  Reviewed health history, thyroid and diabetes endocrine issues  Lab testing, then dose increase of levothyroxine medication  Recent [facility] labs include:  Lab Results   Component Value Date    TSH 3.50 01/23/2023    T4 1.47 01/23/2023     (H) 12/10/2021     Current dose:  Levothyroxine 0.137 mg daily       Diabetes:  History of pre-diabetes  Took metformin during her infertility management with OBGYN, took for 1.5-2 yrs ~2006 Fall 2020. Diagnosis of diabetes mellitus  ?? 7/2021. Restarted metformin med use  11/2020. Labs showed elevated hgbA1c 6.8% indicating T2DM  Metformin dose changed as 500 mg 2-tabs BID, tolerates well     FamHx DM:  none  Previous [facility] hgbA1c trends include:     Lab  Test 21  0934 21  1148 20  0913 19  0943 18  0000   A1C 6.5* 6.6* 6.8* 6.0* 6.0*     Current DM medications:  Metformin 500 mg  2-tabs in morning and evening.    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 Korina Vera/[facility] for general medicine evaluations.  Had seen Dr. Kovacs/[facility]      PMH/PSH:  Past Medical History:   Diagnosis Date     Acquired hypothyroidism      Benign essential hypertension      Cholecystitis with cholelithiasis 2014     Gallstones      Hyperlipidemia      Type 2 diabetes mellitus without complication, without long-term current use of insulin (H)      Past Surgical History:   Procedure Laterality Date     LAPAROSCOPIC CHOLECYSTECTOMY  2014    Procedure: LAPAROSCOPIC CHOLECYSTECTOMY;  Surgeon: Chay Shafer MD;  Location:  OR       Family Hx:  Family History   Problem Relation Age of Onset     Hyperlipidemia Father      Family History Negative Mother          MVA      Breast Cancer Maternal Grandmother          Social Hx:  Social History     Socioeconomic History     Marital status:      Spouse name: Not on file     Number of children: Not on file     Years of education: Not on file      Highest education level: Not on file   Occupational History     Not on file   Tobacco Use     Smoking status: Never     Smokeless tobacco: Never   Substance and Sexual Activity     Alcohol use: Yes     Alcohol/week: 0.0 standard drinks     Comment: rare     Drug use: No     Sexual activity: Yes     Partners: Male   Other Topics Concern     Parent/sibling w/ CABG, MI or angioplasty before 65F 55M? Not Asked   Social History Narrative     Not on file     Social Determinants of Health     Financial Resource Strain: Not on file   Food Insecurity: Not on file   Transportation Needs: Not on file   Physical Activity: Not on file   Stress: Not on file   Social Connections: Not on file   Intimate Partner Violence: Not on file   Housing Stability: Not on file          MEDICATIONS:  has a current medication list which includes the following prescription(s): aspirin, cholecalciferol, fexofenadine, fish oil-omega-3 fatty acids, fluticasone, lactobacillus rhamnosus (gg), levothyroxine, magnesium, metformin, rosuvastatin, triamterene-hctz, vitamin b complex with vitamin c, blood pressure monitoring, montelukast, multivitamin w/minerals, and valacyclovir.    ROS:     ROS: 10 point ROS neg other than the symptoms noted above in the HPI.    GENERAL: some fatigue, wt stable; denies fevers, chills, night sweats.   HEENT: no dysphagia, odonophagia, diplopia, neck pain  THYROID:  no apparent hyper or hypothyroid symptoms  CV: no chest pain, pressure, palpitations  LUNGS: no SOB, BAKER, cough, wheezing   ABDOMEN: some BM urgency; no nausea, diarrhea, constipation, abdominal pain  EXTREMITIES: no rashes, ulcers, edema  NEUROLOGY: no headaches, denies changes in vision, tingling, extremitiy numbness   MSK: arthralgias at hands, knees; denies muscle weakness  SKIN: no rashes or lesions  :  no menses since age 54  PSYCH:  stable mood, no significant anxiety or depression  ENDOCRINE: no heat or cold intolerance      Physical Exam   VS: BP  116/80   Pulse 82   Wt 93.4 kg (205 lb 12.8 oz)   BMI 37.63 kg/m    GENERAL: AXOX3, NAD, well dressed, answering questions appropriately, appears stated age.  ENT: no nose swelling or nasal discharge, mouth redness or gum changes.  EYES: eyes grossly normal to inspection, conjunctivae and sclerae normal, no exophthalmos or proptosis  THYROID:  no apparent nodules or goiter  LUNGS: no audible wheeze, cough or visible cyanosis, or increased work of breathing  ABDOMEN: abdomen obese size  EXTREMITIES: no edema noted  NEUROLOGY: CN grossly intact, no tremors  MSK: grossly intact  SKIN:  no apparent skin lesions, rash, or edema with visualized skin appearance  PSYCH: mentation appears normal, affect normal/bright, judgement and insight intact,   normal speech and appearance well groomed    LABS:    All pertinent notes, labs, and images personally reviewed by me.     A/P:  Encounter Diagnoses   Name Primary?     Hypothyroidism due to Hashimoto's thyroiditis Yes     Type 2 diabetes mellitus without complication, without long-term current use of insulin (H)        Comments:  Reviewed health history, thyroid and diabetes issues.  Overdue for repeat hgbA1c lab testing  Reviewed and interpreted tests that I previously ordered.   Ordered appropriate tests for the endocrinology disease management.    Management options discussed and implemented after shared medical decision making with the patient.  Hypothyroidism and T2DM problems are chronic-stable    Plan:  Discussed general issues with the hypothyroidism diagnosis and management  Reviewed the normal thyroid gland anatomy and hormone physiology  Discussed lab tests used to assess patient thyroid hormone levels  Reviewed use of thyroid medication for hypothyroidism treatment    Discussed general issues with the diabetes diagnosis and management  We discussed the hgbA1c test which reflects previous overall glucose levels or control  Discussed the importance of blood glucose  (BG) testing to assess glucose trends  Provided general overview of the diabetes medication options and medication treatment plan.    Recommend:   Continue the current levothyroxine 0.137 mg daily dose plan, take daily on empty stomach  Sent updated 3-mo levothyroxine Rx to pharmacy  No thyroid U/S imaging needed at this time    Continue current metformin twice daily dose plan  Consider future med alternatives such as Victoza daily, Ozempic vs Trulicity weekly, discussed  Goal target hgbA1c <7%  Plan non-fasting labs soon   Consider testing when PCP appt soon   Lab orders placed  Consider seeing [facility] dietician- CDE for diabetes overview   She would like to focus on her diet plan, wt loss   Also provide T2DM overview and teach BG meter use  Keep focus on diet, exercise, weight management.  Advise having fasting lipid panel testing and dilated eye examination, at least annually    Check with OBGYN or PCP physician regarding f/u DEXA imaging plan  Addressed patient questions today     There are no Patient Instructions on file for this visit.    Future labs ordered today:   Orders Placed This Encounter   Procedures     Hemoglobin A1c     Basic metabolic panel     Radiology/Consults ordered today: None    Total time spent on day of encounter:  20 min    Follow-up:  1/2024, Return    JEROME Durham MD, MS  Endocrinology  [facility]    CC: Korina Vera       Again, thank you for allowing me to participate in the care of your patient.        Sincerely,        Pascual Durham MD

## 2023-04-22 ENCOUNTER — HEALTH MAINTENANCE LETTER (OUTPATIENT)
Age: 58
End: 2023-04-22

## 2023-06-23 DIAGNOSIS — E11.9 TYPE 2 DIABETES MELLITUS WITHOUT COMPLICATION, WITHOUT LONG-TERM CURRENT USE OF INSULIN (H): Chronic | ICD-10-CM

## 2023-06-23 NOTE — TELEPHONE ENCOUNTER
"Requested Prescriptions   Pending Prescriptions Disp Refills     metFORMIN (GLUCOPHAGE) 500 MG tablet [Pharmacy Med Name: METFORMIN  MG TABLET] 60 tablet 23     Sig: TAKE 2 TABLETS BY MOUTH 2 TIMES DAILY WITH MEALS       Biguanide Agents Failed - 6/23/2023  9:14 AM        Failed - Patient has documented A1c within the specified period of time.     If HgbA1C is 8 or greater, it needs to be on file within the past 3 months.  If less than 8, must be on file within the past 6 months.     Recent Labs   Lab Test 12/10/21  1735   A1C 6.6*             Passed - Patient is age 10 or older        Passed - Patient's CR is NOT>1.4 OR Patient's EGFR is NOT<45 within past 12 mos.     Recent Labs   Lab Test 01/23/23  0836 07/13/21  0934 11/24/20  0913   GFRESTIMATED 87   < > 82   GFRESTBLACK  --   --  >90    < > = values in this interval not displayed.       Recent Labs   Lab Test 01/23/23  0836   CR 0.79             Passed - Patient does NOT have a diagnosis of CHF.        Passed - Medication is active on med list        Passed - Patient is not pregnant        Passed - Patient has not had a positive pregnancy test within the past 12 mos.         Passed - Recent (6 mo) or future (30 days) visit within the authorizing provider's specialty     Patient had office visit in the last 6 months or has a visit in the next 30 days with authorizing provider or within the authorizing provider's specialty.  See \"Patient Info\" tab in inbasket, or \"Choose Columns\" in Meds & Orders section of the refill encounter.               Last Written Prescription Date:  1/18/22  Last Fill Quantity: 360 tab,  # refills: 3   Last office visit: 1/31/2023 ; last virtual visit: 1/18/2022 with prescribing provider:  Dr Durham   Future Office Visit:  1/15/24    Patient needs to have labs completed. Sent [employer] message with reminder.    Anand Llanes RN            "

## 2023-06-28 ENCOUNTER — TELEPHONE (OUTPATIENT)
Dept: FAMILY MEDICINE | Facility: CLINIC | Age: 58
End: 2023-06-28
Payer: COMMERCIAL

## 2023-06-28 DIAGNOSIS — E78.5 HYPERLIPIDEMIA, UNSPECIFIED HYPERLIPIDEMIA TYPE: ICD-10-CM

## 2023-06-28 DIAGNOSIS — I10 ESSENTIAL HYPERTENSION: ICD-10-CM

## 2023-06-28 RX ORDER — ROSUVASTATIN CALCIUM 20 MG/1
20 TABLET, COATED ORAL AT BEDTIME
Qty: 30 TABLET | Refills: 0 | OUTPATIENT
Start: 2023-06-28

## 2023-06-28 RX ORDER — TRIAMTERENE AND HYDROCHLOROTHIAZIDE 37.5; 25 MG/1; MG/1
CAPSULE ORAL
Qty: 30 CAPSULE | Refills: 0 | OUTPATIENT
Start: 2023-06-28

## 2023-06-28 NOTE — TELEPHONE ENCOUNTER
Medication Question or Refill        What medication are you calling about (include dose and sig)?: rosuvastatin (CRESTOR) 20 MG tablet  triamterene-HCTZ (DYAZIDE) 37.5-25 MG capsule    metFORMIN (GLUCOPHAGE) 500 MG tablet      Preferred Pharmacy:   [facility]/pharmacy #[id number] - [place], [place] - [address]  [address] [address]  Phone: [phone] Fax: [phone]      Controlled Substance Agreement on file:   CSA -- Patient Level:    CSA: None found at the patient level.       Who prescribed the medication?: TSERING PARSON    Do you need a refill? Yes    When did you use the medication last? 06/28/2023    Patient offered an appointment? No    Do you have any questions or concerns?  No      Could we send this information to you in [facility] or would you prefer to receive a phone call?:   Patient would prefer a phone call   Okay to leave a detailed message?: Yes at Cell number on file:    Telephone Information:   Mobile [phone]

## 2023-06-28 NOTE — TELEPHONE ENCOUNTER
TCs - see below, PCP declines refills as patient very overdue. Can pt be offered a sooner VV if needing refills before upcoming visit?     Blanca SALMON, Triage RN  [facility]

## 2023-06-28 NOTE — TELEPHONE ENCOUNTER
metFORMIN (GLUCOPHAGE) 500 MG tablet 360 tablet 0 6/23/2023  No   Sig: TAKE 2 TABLETS BY MOUTH 2 TIMES DAILY WITH MEALS   Sent to pharmacy as: metFORMIN HCl 500 MG Oral Tablet (GLUCOPHAGE)   Class: E-Prescribe   Notes to Pharmacy: DX Code Needed  PLEASE REFILL.  90 DAY SUPPLY PLEASE..   Order: [id number]   E-Prescribing Status: Receipt confirmed by pharmacy (6/23/2023 10:25 AM CDT)     [id number] - [place], [place] - [address]    rosuvastatin (CRESTOR) 20 MG tablet 30 tablet 0 12/16/2022  No   Sig - Route: TAKE 1 TABLET (20 MG) BY MOUTH AT BEDTIME (OVERDUE FOR APPT) - Oral   Sent to pharmacy as: Rosuvastatin Calcium 20 MG Oral Tablet (CRESTOR)   Class: E-Prescribe   Order: [id number]   E-Prescribing Status: Receipt confirmed by pharmacy (12/16/2022  7:35 AM CST)     triamterene-HCTZ (DYAZIDE) 37.5-25 MG capsule 30 capsule 0 12/16/2022  No   Sig: TAKE 1 CAPSULE BY MOUTH EVERY DAY (OVERDUE FOR APPT)   Sent to pharmacy as: Triamterene-HCTZ 37.5-25 MG Oral Capsule (DYAZIDE)   Class: E-Prescribe   Order: [id number]   E-Prescribing Status: Receipt confirmed by pharmacy (12/16/2022  7:35 AM CST)       [id number] - [place], [place] - [address]

## 2023-06-28 NOTE — TELEPHONE ENCOUNTER
Metformin - duplicate, recently approved by Dr Durham's nurse     Triamterene-hydrochlorothiazide, rosuvastatin- break in medication? Last Rxs 30 day 12/16/22     Appointments in Next Year    Jul 14, 2023  8:30 AM  LAB with CS LAB  [facility] ([facility] ) [phone]   Jul 24, 2023  4:00 PM  (Arrive by 3:40 PM)  Adult Preventative Visit with Korina Vera DO  [facility] ([facility] ) [phone]   Dominic 15, 2024  9:00 AM  RETURN ENDOCRINE with Pascual Durham MD  [facility] ([facility] ) [phone]

## 2023-07-05 ENCOUNTER — MYC REFILL (OUTPATIENT)
Dept: ENDOCRINOLOGY | Facility: CLINIC | Age: 58
End: 2023-07-05
Payer: COMMERCIAL

## 2023-07-05 ENCOUNTER — MYC REFILL (OUTPATIENT)
Dept: FAMILY MEDICINE | Facility: CLINIC | Age: 58
End: 2023-07-05
Payer: COMMERCIAL

## 2023-07-05 DIAGNOSIS — E11.9 TYPE 2 DIABETES MELLITUS WITHOUT COMPLICATION, WITHOUT LONG-TERM CURRENT USE OF INSULIN (H): Chronic | ICD-10-CM

## 2023-07-05 DIAGNOSIS — I10 ESSENTIAL HYPERTENSION: ICD-10-CM

## 2023-07-05 DIAGNOSIS — E78.5 HYPERLIPIDEMIA, UNSPECIFIED HYPERLIPIDEMIA TYPE: ICD-10-CM

## 2023-07-05 RX ORDER — TRIAMTERENE AND HYDROCHLOROTHIAZIDE 37.5; 25 MG/1; MG/1
CAPSULE ORAL
Qty: 30 CAPSULE | Refills: 0 | Status: SHIPPED | OUTPATIENT
Start: 2023-07-05 | End: 2023-07-24

## 2023-07-05 RX ORDER — ROSUVASTATIN CALCIUM 20 MG/1
20 TABLET, COATED ORAL AT BEDTIME
Qty: 30 TABLET | Refills: 0 | Status: SHIPPED | OUTPATIENT
Start: 2023-07-05 | End: 2023-07-24

## 2023-07-05 NOTE — TELEPHONE ENCOUNTER
Appointments in Next Year    Jul 14, 2023  8:30 AM  LAB with CS LAB  [facility] ([facility] ) [phone]   Jul 24, 2023  4:00 PM  (Arrive by 3:40 PM)  Adult Preventative Visit with Korina Vera DO  [facility] ([facility] ) [phone]   Dominic 15, 2024  9:00 AM  RETURN ENDOCRINE with Pascual Durham MD  [facility] ([facility] ) [phone]        Pt is again requesting refills - pt now has future visit scheduled

## 2023-07-14 ENCOUNTER — LAB (OUTPATIENT)
Dept: LAB | Facility: CLINIC | Age: 58
End: 2023-07-14
Payer: COMMERCIAL

## 2023-07-14 DIAGNOSIS — E11.9 TYPE 2 DIABETES MELLITUS WITHOUT COMPLICATION, WITHOUT LONG-TERM CURRENT USE OF INSULIN (H): ICD-10-CM

## 2023-07-14 LAB
ANION GAP SERPL CALCULATED.3IONS-SCNC: 14 MMOL/L (ref 7–15)
BUN SERPL-MCNC: 19.2 MG/DL (ref 6–20)
CALCIUM SERPL-MCNC: 9.8 MG/DL (ref 8.6–10)
CHLORIDE SERPL-SCNC: 103 MMOL/L (ref 98–107)
CREAT SERPL-MCNC: 0.86 MG/DL (ref 0.51–0.95)
DEPRECATED HCO3 PLAS-SCNC: 24 MMOL/L (ref 22–29)
GFR SERPL CREATININE-BSD FRML MDRD: 78 ML/MIN/1.73M2
GLUCOSE SERPL-MCNC: 204 MG/DL (ref 70–99)
HBA1C MFR BLD: 8.1 % (ref 0–5.6)
POTASSIUM SERPL-SCNC: 4.7 MMOL/L (ref 3.4–5.3)
SODIUM SERPL-SCNC: 141 MMOL/L (ref 136–145)

## 2023-07-14 PROCEDURE — 80048 BASIC METABOLIC PNL TOTAL CA: CPT

## 2023-07-14 PROCEDURE — 36415 COLL VENOUS BLD VENIPUNCTURE: CPT

## 2023-07-14 PROCEDURE — 83036 HEMOGLOBIN GLYCOSYLATED A1C: CPT

## 2023-07-17 ENCOUNTER — HOSPITAL ENCOUNTER (OUTPATIENT)
Dept: MAMMOGRAPHY | Facility: CLINIC | Age: 58
Discharge: HOME | End: 2023-07-17
Attending: INTERNAL MEDICINE | Admitting: INTERNAL MEDICINE
Payer: COMMERCIAL

## 2023-07-17 DIAGNOSIS — Z12.31 VISIT FOR SCREENING MAMMOGRAM: ICD-10-CM

## 2023-07-17 PROCEDURE — 77067 SCR MAMMO BI INCL CAD: CPT

## 2023-07-23 ASSESSMENT — ENCOUNTER SYMPTOMS
HEMATOCHEZIA: 0
SHORTNESS OF BREATH: 0
ARTHRALGIAS: 1
DYSURIA: 0
CHILLS: 0
NERVOUS/ANXIOUS: 0
PALPITATIONS: 0
COUGH: 0
NAUSEA: 0
ABDOMINAL PAIN: 0
JOINT SWELLING: 0
HEARTBURN: 0
EYE PAIN: 0
WEAKNESS: 0
FREQUENCY: 0
FEVER: 0
HEADACHES: 0
MYALGIAS: 0
SORE THROAT: 0
HEMATURIA: 0
DIARRHEA: 0
DIZZINESS: 0
PARESTHESIAS: 0
BREAST MASS: 0
CONSTIPATION: 0

## 2023-07-24 ENCOUNTER — OFFICE VISIT (OUTPATIENT)
Dept: FAMILY MEDICINE | Facility: CLINIC | Age: 58
End: 2023-07-24
Payer: COMMERCIAL

## 2023-07-24 VITALS
HEIGHT: 62 IN | RESPIRATION RATE: 18 BRPM | TEMPERATURE: 98 F | SYSTOLIC BLOOD PRESSURE: 124 MMHG | DIASTOLIC BLOOD PRESSURE: 86 MMHG | BODY MASS INDEX: 37.6 KG/M2 | OXYGEN SATURATION: 97 % | WEIGHT: 204.3 LBS | HEART RATE: 96 BPM

## 2023-07-24 DIAGNOSIS — I10 ESSENTIAL HYPERTENSION: Chronic | ICD-10-CM

## 2023-07-24 DIAGNOSIS — E78.5 HYPERLIPIDEMIA, UNSPECIFIED HYPERLIPIDEMIA TYPE: Chronic | ICD-10-CM

## 2023-07-24 DIAGNOSIS — E03.9 HYPOTHYROIDISM, UNSPECIFIED TYPE: Chronic | ICD-10-CM

## 2023-07-24 DIAGNOSIS — Z00.00 ROUTINE HISTORY AND PHYSICAL EXAMINATION OF ADULT: Primary | ICD-10-CM

## 2023-07-24 DIAGNOSIS — Z12.11 COLON CANCER SCREENING: ICD-10-CM

## 2023-07-24 DIAGNOSIS — K76.0 FATTY LIVER: Chronic | ICD-10-CM

## 2023-07-24 DIAGNOSIS — E11.65 TYPE 2 DIABETES MELLITUS WITH HYPERGLYCEMIA, WITHOUT LONG-TERM CURRENT USE OF INSULIN (H): ICD-10-CM

## 2023-07-24 DIAGNOSIS — E66.01 MORBID OBESITY (H): ICD-10-CM

## 2023-07-24 DIAGNOSIS — G47.33 OSA (OBSTRUCTIVE SLEEP APNEA): ICD-10-CM

## 2023-07-24 DIAGNOSIS — H93.13 TINNITUS OF BOTH EARS: ICD-10-CM

## 2023-07-24 LAB
HCT VFR BLD AUTO: 42.3 % (ref 35–47)
HGB BLD-MCNC: 14.2 G/DL (ref 11.7–15.7)

## 2023-07-24 PROCEDURE — 83721 ASSAY OF BLOOD LIPOPROTEIN: CPT | Mod: 59 | Performed by: INTERNAL MEDICINE

## 2023-07-24 PROCEDURE — 82043 UR ALBUMIN QUANTITATIVE: CPT | Performed by: INTERNAL MEDICINE

## 2023-07-24 PROCEDURE — 85018 HEMOGLOBIN: CPT | Performed by: INTERNAL MEDICINE

## 2023-07-24 PROCEDURE — 90471 IMMUNIZATION ADMIN: CPT | Performed by: INTERNAL MEDICINE

## 2023-07-24 PROCEDURE — 80061 LIPID PANEL: CPT | Performed by: INTERNAL MEDICINE

## 2023-07-24 PROCEDURE — 82570 ASSAY OF URINE CREATININE: CPT | Performed by: INTERNAL MEDICINE

## 2023-07-24 PROCEDURE — 80076 HEPATIC FUNCTION PANEL: CPT | Performed by: INTERNAL MEDICINE

## 2023-07-24 PROCEDURE — 90677 PCV20 VACCINE IM: CPT | Performed by: INTERNAL MEDICINE

## 2023-07-24 PROCEDURE — 99396 PREV VISIT EST AGE 40-64: CPT | Mod: 25 | Performed by: INTERNAL MEDICINE

## 2023-07-24 PROCEDURE — 99214 OFFICE O/P EST MOD 30 MIN: CPT | Mod: 25 | Performed by: INTERNAL MEDICINE

## 2023-07-24 PROCEDURE — 36415 COLL VENOUS BLD VENIPUNCTURE: CPT | Performed by: INTERNAL MEDICINE

## 2023-07-24 PROCEDURE — 85014 HEMATOCRIT: CPT | Performed by: INTERNAL MEDICINE

## 2023-07-24 RX ORDER — ROSUVASTATIN CALCIUM 20 MG/1
20 TABLET, COATED ORAL AT BEDTIME
Qty: 90 TABLET | Refills: 3 | Status: SHIPPED | OUTPATIENT
Start: 2023-07-24 | End: 2024-08-29

## 2023-07-24 RX ORDER — TRIAMTERENE AND HYDROCHLOROTHIAZIDE 37.5; 25 MG/1; MG/1
CAPSULE ORAL
Qty: 90 CAPSULE | Refills: 3 | Status: SHIPPED | OUTPATIENT
Start: 2023-07-24 | End: 2024-08-29

## 2023-07-24 ASSESSMENT — ENCOUNTER SYMPTOMS
CONSTIPATION: 0
WEAKNESS: 0
FEVER: 0
HEADACHES: 0
DYSURIA: 0
HEARTBURN: 0
DIARRHEA: 0
PALPITATIONS: 0
SORE THROAT: 0
BREAST MASS: 0
HEMATOCHEZIA: 0
HEMATURIA: 0
FREQUENCY: 0
MYALGIAS: 0
COUGH: 0
DIZZINESS: 0
SHORTNESS OF BREATH: 0
JOINT SWELLING: 0
CHILLS: 0
EYE PAIN: 0
ARTHRALGIAS: 1
NERVOUS/ANXIOUS: 0
ABDOMINAL PAIN: 0
NAUSEA: 0
PARESTHESIAS: 0

## 2023-07-24 ASSESSMENT — PAIN SCALES - GENERAL: PAINLEVEL: NO PAIN (0)

## 2023-07-24 NOTE — PROGRESS NOTES
SUBJECTIVE:   CC: Mariann is an 57 year old who presents for preventive health visit.         Healthy Habits:     Getting at least 3 servings of Calcium per day:  Yes    Bi-annual eye exam:  NO    Dental care twice a year:  Yes    Sleep apnea or symptoms of sleep apnea:  Sleep apnea    Diet:  Regular (no restrictions)    Frequency of exercise:  4-5 days/week    Duration of exercise:  30-45 minutes    Taking medications regularly:  Yes    Medication side effects:  None    Additional concerns today:  Yes      Today's PHQ-2 Score:       7/23/2023     5:23 PM   PHQ-2 ( 1999 [employer])   Q1: Little interest or pleasure in doing things 0   Q2: Feeling down, depressed or hopeless 0   PHQ-2 Score 0   Q1: Little interest or pleasure in doing things Not at all   Q2: Feeling down, depressed or hopeless Not at all   PHQ-2 Score 0                   Have you ever done Advance Care Planning? (For example, a Health Directive, POLST, or a discussion with a medical provider or your loved ones about your wishes):     Social History     Tobacco Use    Smoking status: Never    Smokeless tobacco: Never   Substance Use Topics    Alcohol use: Yes     Alcohol/week: 0.0 standard drinks of alcohol     Comment: rare             7/23/2023     5:22 PM   Alcohol Use   Prescreen: >3 drinks/day or >7 drinks/week? No     Reviewed orders with patient.  Reviewed health maintenance and updated orders accordingly -       Breast Cancer Screening:    FHS-7:       7/17/2023     8:03 AM 7/23/2023     5:26 PM   Breast CA Risk Assessment (FHS-7)   Did any of your first-degree relatives have breast or ovarian cancer? No Yes   Did any of your relatives have bilateral breast cancer? No Unknown   Did any man in your family have breast cancer? No No   Did any woman in your family have breast and ovarian cancer? No Yes   Did any woman in your family have breast cancer before age 50 y? Yes Unknown   Do you have 2 or more relatives with breast and/or ovarian cancer? Yes  "Yes   Do you have 2 or more relatives with breast and/or bowel cancer? No Yes         Pertinent mammograms are reviewed under the imaging tab.    History of abnormal Pap smear:      Reviewed and updated as needed this visit by clinical staff   Tobacco  Allergies  Meds              Reviewed and updated as needed this visit by Provider                     Review of Systems   Constitutional:  Negative for chills and fever.   HENT:  Negative for ear pain, hearing loss and sore throat.    Eyes:  Negative for pain and visual disturbance.   Respiratory:  Negative for cough and shortness of breath.    Cardiovascular:  Positive for peripheral edema. Negative for chest pain and palpitations.   Gastrointestinal:  Negative for abdominal pain, constipation, diarrhea, heartburn, hematochezia and nausea.   Breasts:  Negative for tenderness, breast mass and discharge.   Genitourinary:  Negative for dysuria, frequency, genital sores, hematuria, pelvic pain, urgency, vaginal bleeding and vaginal discharge.   Musculoskeletal:  Positive for arthralgias. Negative for joint swelling and myalgias.   Skin:  Negative for rash.   Neurological:  Negative for dizziness, weakness, headaches and paresthesias.   Psychiatric/Behavioral:  Negative for mood changes. The patient is not nervous/anxious.           OBJECTIVE:   /86 (BP Location: Left arm, Patient Position: Sitting, Cuff Size: Adult Large)   Pulse 96   Temp 98  F (36.7  C) (Oral)   Resp 18   Ht 1.58 m (5' 2.21\")   Wt 92.7 kg (204 lb 4.8 oz)   SpO2 97%   BMI 37.12 kg/m    Physical Exam    GENERAL APPEARANCE: AAOx3, no distress. Well developed.    RESP: Lungs CTA bilaterally. No w/r/r. No distress     CV: RRR, S1/S2 present. No m/r/c.     ABDOMEN:  soft, nontender, no distention. No rebound or guarding.     EXT: No c/c/e in lower extremities b/l. No rashes or deformities noted.    PSYCH: appropriate mood and affect.     EARS: Nonimpacted bilaterally, TM " intact          ASSESSMENT/PLAN:   Susi was seen today for physical.    Diagnoses and all orders for this visit:    Routine history and physical examination of adult   PCV20 administered today   Discussed shingrix-she will consider doing this through pharmacy    Type 2 diabetes mellitus with hyperglycemia, without long-term current use of insulin (H)  Following endo, she declines second agent and admits she was running low on metformin and taking two tabs daily rather than 4 total in the day. She is motivated to work on lifestyle and plans to follow-up with endo in three months for close follow-up.  She is overdue for eye exam-referral placed  Check below labs:  -     Adult Eye  Referral; Future  -     Lipid panel reflex to direct LDL Non-fasting; Future  -     Albumin Random Urine Quantitative with Creat Ratio; Future  -     Hemoglobin and hematocrit; Future    Essential hypertension  controlled  -     triamterene-HCTZ (DYAZIDE) 37.5-25 MG capsule; TAKE 1 CAPSULE BY MOUTH EVERY DAY    Fatty liver  Check labs  -     Hepatic panel (Albumin, ALT, AST, Bili, Alk Phos, TP); Future    Hyperlipidemia, unspecified hyperlipidemia type  Update lipids and refill rx       rosuvastatin (CRESTOR) 20 MG tablet; Take 1 tablet (20 mg) by mouth At Bedtime    Hypothyroidism, unspecified type   Labs UTD    OSEAS (obstructive sleep apnea)   Follows sleep specialist at [facility], compliant with cpap    Obesity (BMI 35.0-39.9) with comorbidity (H)   We did review that newer diabetic meds assist with weight loss more so than metformin however she is not quite ready to commit to a second agent and will revisit if A1c not improving.    Colon cancer screening  Next cscope due for screening due to polyps, referral placed  -     Colonoscopy Screening  Referral; Future    Tinnitus of both ears  -     Adult ENT  Referral; Future    Other orders  -     Pneumococcal 20 Valent Conjugate (Prevnar 20)        She reports  that she has never smoked. She has never used smokeless tobacco.      Korina Vera DO  [facility]

## 2023-07-24 NOTE — NURSING NOTE
Prior to immunization administration, verified patients identity using patient s name and date of birth. Please see Immunization Activity for additional information.     Screening Questionnaire for Adult Immunization    Are you sick today?   No   Do you have allergies to medications, food, a vaccine component or latex?   Yes   Have you ever had a serious reaction after receiving a vaccination?   No   Do you have a long-term health problem with heart, lung, kidney, or metabolic disease (e.g., diabetes), asthma, a blood disorder, no spleen, complement component deficiency, a cochlear implant, or a spinal fluid leak?  Are you on long-term aspirin therapy?   No   Do you have cancer, leukemia, HIV/AIDS, or any other immune system problem?   No   Do you have a parent, brother, or sister with an immune system problem?   No   In the past 3 months, have you taken medications that affect  your immune system, such as prednisone, other steroids, or anticancer drugs; drugs for the treatment of rheumatoid arthritis, Crohn s disease, or psoriasis; or have you had radiation treatments?   No   Have you had a seizure, or a brain or other nervous system problem?   No   During the past year, have you received a transfusion of blood or blood    products, or been given immune (gamma) globulin or antiviral drug?   No   For women: Are you pregnant or is there a chance you could become       pregnant during the next month?   No   Have you received any vaccinations in the past 4 weeks?   No     Immunization questionnaire was positive for at least one answer.  Notified no one.      Patient instructed to remain in clinic for 15 minutes afterwards, and to report any adverse reactions.     Screening performed by Yola Slaughter MA on 7/24/2023 at 4:23 PM.

## 2023-07-25 LAB
ALBUMIN SERPL BCG-MCNC: 4.6 G/DL (ref 3.5–5.2)
ALP SERPL-CCNC: 89 U/L (ref 35–104)
ALT SERPL W P-5'-P-CCNC: 33 U/L (ref 0–50)
AST SERPL W P-5'-P-CCNC: 28 U/L (ref 0–45)
BILIRUB DIRECT SERPL-MCNC: <0.2 MG/DL (ref 0–0.3)
BILIRUB SERPL-MCNC: 0.4 MG/DL
CHOLEST SERPL-MCNC: 150 MG/DL
CREAT UR-MCNC: 90.6 MG/DL
HDLC SERPL-MCNC: 28 MG/DL
LDLC SERPL CALC-MCNC: ABNORMAL MG/DL
LDLC SERPL DIRECT ASSAY-MCNC: 57 MG/DL
MICROALBUMIN UR-MCNC: <12 MG/L
MICROALBUMIN/CREAT UR: NORMAL MG/G{CREAT}
NONHDLC SERPL-MCNC: 122 MG/DL
PROT SERPL-MCNC: 7 G/DL (ref 6.4–8.3)
TRIGL SERPL-MCNC: 564 MG/DL

## 2023-09-23 DIAGNOSIS — E11.9 TYPE 2 DIABETES MELLITUS WITHOUT COMPLICATION, WITHOUT LONG-TERM CURRENT USE OF INSULIN (H): Chronic | ICD-10-CM

## 2023-09-25 NOTE — TELEPHONE ENCOUNTER
"Requested Prescriptions   Pending Prescriptions Disp Refills    metFORMIN (GLUCOPHAGE) 500 MG tablet [Pharmacy Med Name: METFORMIN  MG TABLET] 120 tablet 2     Sig: TAKE 2 TABLETS BY MOUTH TWICE A DAY WITH MEALS       Biguanide Agents Failed - 9/23/2023  7:49 AM        Failed - Recent (6 mo) or future (30 days) visit within the authorizing provider's specialty     Patient had office visit in the last 6 months or has a visit in the next 30 days with authorizing provider or within the authorizing provider's specialty.  See \"Patient Info\" tab in inbasket, or \"Choose Columns\" in Meds & Orders section of the refill encounter.            Passed - Patient is age 10 or older        Passed - Patient has documented A1c within the specified period of time.     If HgbA1C is 8 or greater, it needs to be on file within the past 3 months.  If less than 8, must be on file within the past 6 months.     Recent Labs   Lab Test 07/14/23  0837   A1C 8.1*             Passed - Patient's CR is NOT>1.4 OR Patient's EGFR is NOT<45 within past 12 mos.     Recent Labs   Lab Test 07/14/23  0837 07/13/21  0934 11/24/20  0913   GFRESTIMATED 78   < > 82   GFRESTBLACK  --   --  >90    < > = values in this interval not displayed.       Recent Labs   Lab Test 07/14/23  0837   CR 0.86             Passed - Patient does NOT have a diagnosis of CHF.        Passed - Medication is active on med list        Passed - Patient is not pregnant        Passed - Patient has not had a positive pregnancy test within the past 12 mos.            Last Written Prescription Date:  6/23/23  Last Fill Quantity: 360 tab,  # refills: 0   Last office visit: 1/31/2023 ; last virtual visit: 1/18/2022 with prescribing provider:  Dr Durham   Future Office Visit:  6/23/23    Refill sent  Anand Llanes RN            "

## 2023-12-02 ENCOUNTER — HEALTH MAINTENANCE LETTER (OUTPATIENT)
Age: 58
End: 2023-12-02

## 2024-01-15 ENCOUNTER — OFFICE VISIT (OUTPATIENT)
Dept: ENDOCRINOLOGY | Facility: CLINIC | Age: 59
End: 2024-01-15
Payer: COMMERCIAL

## 2024-01-15 VITALS
WEIGHT: 199 LBS | SYSTOLIC BLOOD PRESSURE: 126 MMHG | BODY MASS INDEX: 36.62 KG/M2 | HEART RATE: 87 BPM | DIASTOLIC BLOOD PRESSURE: 86 MMHG | HEIGHT: 62 IN

## 2024-01-15 DIAGNOSIS — E11.9 TYPE 2 DIABETES MELLITUS WITHOUT COMPLICATION, WITHOUT LONG-TERM CURRENT USE OF INSULIN (H): Primary | ICD-10-CM

## 2024-01-15 DIAGNOSIS — E06.3 HYPOTHYROIDISM DUE TO HASHIMOTO'S THYROIDITIS: ICD-10-CM

## 2024-01-15 PROCEDURE — 99214 OFFICE O/P EST MOD 30 MIN: CPT | Performed by: INTERNAL MEDICINE

## 2024-01-15 NOTE — PROGRESS NOTES
Recent issues:  Hypothyroidism and diabetes follow-up evaluation  Recent vacation in [facility], went well  Reviewed medical history from patient, prior labs 7/2023 with elevated hgbA1c, and Epic chart record        Thyroid:  ~2006. Initial diagnosis of hypothyroidism   Had OBGYN for infertility evaluation with Dr. Kovacs/[facility]   Started levothyroxine medication  Typically taking levothyroxine 0.15 mg most often  No known history of thyroid nodules  FamHx thyroid disease: Sister- hypothyroidism    Had seen Dr. DANITZA Todd/[facility]  Subsequently saw Dr. Korina Vera/[facility]  Dose reductions with levothyroxine medication.  7/2021. Dose reduction levothyroxine to 0.125 mg daily  Has felt less anxious and tired on that dose    Previous [facility] thyroid tests include:     Lab Test 07/13/21  0934 03/05/21  1148 11/24/20  0913 09/14/18  0000 04/02/18  1030   TSH 0.08* <0.01* 0.04* 3.590 2.05   T4 1.56* 1.57* 1.68*  --   --            10/6/21. Initial endocrinology evaluation with me at [facility]  Reviewed health history, thyroid and diabetes endocrine issues  Lab testing, then dose increase of levothyroxine medication  Recent [facility] labs include:  Lab Results   Component Value Date    TSH 3.50 01/23/2023    T4 1.47 01/23/2023     (H) 12/10/2021     Current dose:  Levothyroxine 0.137 mg daily       Diabetes:  History of pre-diabetes  Took metformin during her infertility management with OBGYN, took for 1.5-2 yrs ~2006 Fall 2020. Diagnosis of diabetes mellitus  ?? 7/2021. Restarted metformin med use  11/2020. Labs showed elevated hgbA1c 6.8% indicating T2DM  Metformin dose changed as 500 mg 2-tabs BID, tolerates well     FamHx DM:  none  Previous [facility] hgbA1c trends include:     Lab Test 07/13/21  0934 03/05/21  1148 11/24/20  0913 03/29/19  0943 09/14/18  0000   A1C 6.5* 6.6* 6.8* 6.0* 6.0*     Current DM medications:  Metformin 500 mg as 2-tabs in morning and evening.    Blood glucose (BG) meter:  none   Hasn't done any  testing  Previous [facility] hgbA1c trends include:  Lab Results   Component Value Date    A1C 8.1 (H) 07/14/2023    A1C 6.6 (H) 12/10/2021    A1C 6.5 (H) 07/13/2021    A1C 6.6 (H) 03/05/2021    A1C 6.8 (H) 11/24/2020      Recent [facility] labs include:  Lab Results   Component Value Date    A1C 8.1 (H) 07/14/2023     07/14/2023    POTASSIUM 4.7 07/14/2023    CHLORIDE 103 07/14/2023    CO2 24 07/14/2023    ANIONGAP 14 07/14/2023     (H) 07/14/2023    BUN 19.2 07/14/2023    CR 0.86 07/14/2023    GFRESTIMATED 78 07/14/2023    GFRESTBLACK >90 11/24/2020    MICKY 9.8 07/14/2023    CHOL 150 07/24/2023    TRIG 564 (H) 07/24/2023    HDL 28 (L) 07/24/2023    LDL  07/24/2023      Comment:      Cannot estimate LDL when triglyceride exceeds 400 mg/dL    LDL 57 07/24/2023    NHDL 122 07/24/2023    UCRR 90.6 07/24/2023    MICROL <12.0 07/24/2023    UMALCR  07/24/2023      Comment:      Unable to calculate, urine albumin and/or urine creatinine is outside detectable limits.  Microalbuminuria is defined as an albumin:creatinine ratio of 17 to 299 for males and 25 to 299 for females. A ratio of albumin:creatinine of 300 or higher is indicative of overt proteinuria.  Due to biologic variability, positive results should be confirmed by a second, first-morning random or 24-hour timed urine specimen. If there is discrepancy, a third specimen is recommended. When 2 out of 3 results are in the microalbuminuria range, this is evidence for incipient nephropathy and warrants increased efforts at glucose control, blood pressure control, and institution of therapy with an angiotensin-converting-enzyme (ACE) inhibitor (if the patient can tolerate it).      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 Korina Vera/[facility] for general medicine evaluations.  Had seen Dr. Kovacs/[facility]      PMH/PSH:  Past Medical  History:   Diagnosis Date    Acquired hypothyroidism     Benign essential hypertension     Cholecystitis with cholelithiasis 2014    Gallstones     Hyperlipidemia     Type 2 diabetes mellitus without complication, without long-term current use of insulin (H)      Past Surgical History:   Procedure Laterality Date    LAPAROSCOPIC CHOLECYSTECTOMY  2014    Procedure: LAPAROSCOPIC CHOLECYSTECTOMY;  Surgeon: Chay Shafer MD;  Location:  OR       Family Hx:  Family History   Problem Relation Age of Onset    Hyperlipidemia Father     Family History Negative Mother          MVA     Breast Cancer Maternal Grandmother          Social Hx:  Social History     Socioeconomic History    Marital status:      Spouse name: Not on file    Number of children: Not on file    Years of education: Not on file    Highest education level: Not on file   Occupational History    Not on file   Tobacco Use    Smoking status: Never    Smokeless tobacco: Never   Vaping Use    Vaping Use: Never used   Substance and Sexual Activity    Alcohol use: Yes     Alcohol/week: 0.0 standard drinks of alcohol     Comment: rare    Drug use: No    Sexual activity: Yes     Partners: Male   Other Topics Concern    Parent/sibling w/ CABG, MI or angioplasty before 65F 55M? Not Asked   Social History Narrative    Not on file     Social Determinants of Health     Financial Resource Strain: Not on file   Food Insecurity: Not on file   Transportation Needs: Not on file   Physical Activity: Not on file   Stress: Not on file   Social Connections: Not on file   Interpersonal Safety: Not on file   Housing Stability: Not on file          MEDICATIONS:  has a current medication list which includes the following prescription(s): aspirin, cholecalciferol, fexofenadine, fish oil-omega-3 fatty acids, fluticasone, lactobacillus rhamnosus (gg), levothyroxine, magnesium, metformin, rosuvastatin, triamterene-hctz, vitamin b complex with vitamin c,  "blood pressure monitoring, multivitamin w/minerals, and valacyclovir.    ROS:     ROS: 10 point ROS neg other than the symptoms noted above in the HPI.    GENERAL: some fatigue, wt stable; denies fevers, chills, night sweats.   HEENT: no dysphagia, odonophagia, diplopia, neck pain  THYROID:  no apparent hyper or hypothyroid symptoms  CV: no chest pain, pressure, palpitations  LUNGS: no SOB, BAKER, cough, wheezing   ABDOMEN: some BM urgency; no nausea, diarrhea, constipation, abdominal pain  EXTREMITIES: no rashes, ulcers, edema  NEUROLOGY: no headaches, denies changes in vision, tingling, extremitiy numbness   MSK: arthralgias at hands, knees; denies muscle weakness  SKIN: no rashes or lesions  :  no menses since age 54, no hot flashes  PSYCH:  stable mood, no significant anxiety or depression  ENDOCRINE: no heat or cold intolerance      Physical Exam   VS: /86   Pulse 87   Ht 1.575 m (5' 2\")   Wt 90.3 kg (199 lb)   BMI 36.40 kg/m    GENERAL: AXOX3, NAD, well dressed, answering questions appropriately, appears stated age.  ENT: no nose swelling or nasal discharge, mouth redness or gum changes.  EYES: eyes grossly normal to inspection, conjunctivae and sclerae normal, no exophthalmos or proptosis  THYROID:  no apparent nodules or goiter  LUNGS: no audible wheeze, cough or visible cyanosis, or increased work of breathing  ABDOMEN: abdomen obese size  EXTREMITIES: no edema noted  NEUROLOGY: CN grossly intact, no tremors  MSK: grossly intact  SKIN:  no apparent skin lesions, rash, or edema with visualized skin appearance  PSYCH: mentation appears normal, affect normal/bright, judgement and insight intact,   normal speech and appearance well groomed    LABS:    All pertinent notes, labs, and images personally reviewed by me.     A/P:  Encounter Diagnoses   Name Primary?    Type 2 diabetes mellitus without complication, without long-term current use of insulin (H) Yes    Hypothyroidism due to Hashimoto's " thyroiditis          Comments:  Reviewed health history, thyroid and diabetes issues.  Overdue for repeat hgbA1c lab testing  Reviewed and interpreted tests that I previously ordered.   Ordered appropriate tests for the endocrinology disease management.    Management options discussed and implemented after shared medical decision making with the patient.  Diabetes problem is chronic with exacerbation progression, hyperglycemia     Plan:  Reviewed general issues with the hypothyroidism diagnosis and management  Discussed lab tests used to assess patient thyroid hormone levels  Reviewed use of thyroid medication for hypothyroidism treatment    Discussed general issues with the diabetes diagnosis and management  We discussed the hgbA1c test which reflects previous overall glucose levels or control  Discussed the importance of blood glucose (BG) testing to assess glucose trends  Provided general overview of the diabetes medication options and medication treatment plan.    Recommend:   Continue the current levothyroxine 0.137 mg daily dose plan, take daily on empty stomach  We have previously sent updated 3-mo levothyroxine Rx to pharmacy  No thyroid U/S imaging needed at this time    Continue current metformin twice daily dose plan  Consider future med alternatives such as Januvia, Tradjenta, Victoza daily, Ozempic, Mounjaro vs Trulicity weekly, discussed  Goal target hgbA1c <7%  Plan non-fasting labs soon   Testing at [facility]   Lab orders placed  Consider seeing [facility] dietician- CDE for diabetes overview   She would like to focus on her diet plan, wt loss   Also provide T2DM overview and teach BG meter use  Keep focus on diet, exercise, weight management.  Advise having fasting lipid panel testing and dilated eye examination, at least annually    Check with OBGYN or PCP physician regarding f/u DEXA imaging plan  Addressed patient questions today     There are no Patient Instructions on file for this  visit.    Future labs ordered today:   Orders Placed This Encounter   Procedures    Hemoglobin A1c    Basic metabolic panel    ALT    TSH    T4 free     Radiology/Consults ordered today: None    Total time spent on day of encounter:  20 min    Follow-up:  7/2024, Return    JEROME Duhram MD, MS  Endocrinology  [facility]

## 2024-01-25 ENCOUNTER — LAB (OUTPATIENT)
Dept: LAB | Facility: CLINIC | Age: 59
End: 2024-01-25
Payer: COMMERCIAL

## 2024-01-25 DIAGNOSIS — E06.3 HYPOTHYROIDISM DUE TO HASHIMOTO'S THYROIDITIS: ICD-10-CM

## 2024-01-25 DIAGNOSIS — E11.9 TYPE 2 DIABETES MELLITUS WITHOUT COMPLICATION, WITHOUT LONG-TERM CURRENT USE OF INSULIN (H): ICD-10-CM

## 2024-01-25 LAB
ALT SERPL W P-5'-P-CCNC: 37 U/L (ref 0–50)
ANION GAP SERPL CALCULATED.3IONS-SCNC: 13 MMOL/L (ref 7–15)
BUN SERPL-MCNC: 15.6 MG/DL (ref 6–20)
CALCIUM SERPL-MCNC: 9.9 MG/DL (ref 8.6–10)
CHLORIDE SERPL-SCNC: 102 MMOL/L (ref 98–107)
CREAT SERPL-MCNC: 0.78 MG/DL (ref 0.51–0.95)
DEPRECATED HCO3 PLAS-SCNC: 25 MMOL/L (ref 22–29)
EGFRCR SERPLBLD CKD-EPI 2021: 88 ML/MIN/1.73M2
GLUCOSE SERPL-MCNC: 192 MG/DL (ref 70–99)
HBA1C MFR BLD: 8.2 % (ref 0–5.6)
POTASSIUM SERPL-SCNC: 4.2 MMOL/L (ref 3.4–5.3)
SODIUM SERPL-SCNC: 140 MMOL/L (ref 135–145)
T4 FREE SERPL-MCNC: 1.66 NG/DL (ref 0.9–1.7)
TSH SERPL DL<=0.005 MIU/L-ACNC: 1.27 UIU/ML (ref 0.3–4.2)

## 2024-01-25 PROCEDURE — 84460 ALANINE AMINO (ALT) (SGPT): CPT

## 2024-01-25 PROCEDURE — 80048 BASIC METABOLIC PNL TOTAL CA: CPT

## 2024-01-25 PROCEDURE — 84443 ASSAY THYROID STIM HORMONE: CPT

## 2024-01-25 PROCEDURE — 36415 COLL VENOUS BLD VENIPUNCTURE: CPT

## 2024-01-25 PROCEDURE — 84439 ASSAY OF FREE THYROXINE: CPT

## 2024-01-25 PROCEDURE — 83036 HEMOGLOBIN GLYCOSYLATED A1C: CPT

## 2024-01-28 DIAGNOSIS — E11.9 TYPE 2 DIABETES MELLITUS WITHOUT COMPLICATION, WITHOUT LONG-TERM CURRENT USE OF INSULIN (H): Primary | ICD-10-CM

## 2024-02-11 DIAGNOSIS — E06.3 HYPOTHYROIDISM DUE TO HASHIMOTO'S THYROIDITIS: ICD-10-CM

## 2024-02-12 RX ORDER — LEVOTHYROXINE SODIUM 137 UG/1
137 TABLET ORAL DAILY
Qty: 90 TABLET | Refills: 3 | Status: SHIPPED | OUTPATIENT
Start: 2024-02-12

## 2024-02-12 NOTE — TELEPHONE ENCOUNTER
Requested Prescriptions   Pending Prescriptions Disp Refills    levothyroxine (SYNTHROID/LEVOTHROID) 137 MCG tablet [Pharmacy Med Name: LEVOTHYROXINE 137 MCG TABLET] 30 tablet 11     Sig: TAKE 1 TABLET BY MOUTH EVERY DAY       Thyroid Protocol Passed - 2/11/2024  7:11 AM        Passed - Patient is 12 years or older        Passed - Recent (12 mo) or future (30 days) visit within the authorizing provider's specialty     The patient must have completed an in-person or virtual visit within the past 12 months or has a future visit scheduled within the next 90 days with the authorizing provider s specialty.  Urgent care and e-visits do not quality as an office visit for this protocol.          Passed - Medication is active on med list        Passed - Normal TSH on file in past 12 months     Recent Labs   Lab Test 01/25/24  0855   TSH 1.27              Passed - No active pregnancy on record     If patient is pregnant or has had a positive pregnancy test, please check TSH.          Passed - No positive pregnancy test in past 12 months     If patient is pregnant or has had a positive pregnancy test, please check TSH.             Last Written Prescription Date:  1/31/23  Last Fill Quantity: 90 tab,  # refills: 3   Last office visit: 1/15/2024 ; last virtual visit: Visit date not found with prescribing provider:  Dr Durham   Future Office Visit:  7/23/24    Refill sent  Anand Llanes RN

## 2024-03-30 DIAGNOSIS — E11.9 TYPE 2 DIABETES MELLITUS WITHOUT COMPLICATION, WITHOUT LONG-TERM CURRENT USE OF INSULIN (H): Chronic | ICD-10-CM

## 2024-04-01 NOTE — TELEPHONE ENCOUNTER
Last Written Prescription Date:  9/25/23  Last Fill Quantity: 360,  # refills: 1   Last office visit: 1/15/2024   Future Office Visit:  7/23/24        Requested Prescriptions   Pending Prescriptions Disp Refills    metFORMIN (GLUCOPHAGE) 500 MG tablet [Pharmacy Med Name: METFORMIN  MG TABLET] 60 tablet 11     Sig: TAKE 2 TABLETS BY MOUTH TWICE A DAY WITH MEALS       Biguanide Agents Passed - 3/30/2024 12:16 PM        Passed - Patient is age 10 or older        Passed - Patient has documented A1c within the specified period of time.     If HgbA1C is 8 or greater, it needs to be on file within the past 3 months.  If less than 8, must be on file within the past 6 months.     Recent Labs   Lab Test 01/25/24  0855   A1C 8.2*             Passed - Patient does NOT have a diagnosis of CHF.        Passed - Medication is active on med list        Passed - Medication indicated for associated diagnosis     Medication is associated with one or more of the following diagnoses:     Gestational diabetes mellitus     Hyperinsulinar obesity     Hypersecretion of ovarian androgens    Non-alcoholic fatty liver    Polycystic ovarian syndrome               Pre-diabetes (DM 2 prevention)    Type 2 diabetes mellitus     Weight gain, antipsychotic therapy-induced             Passed - Has GFR on file in past 12 months and most recent value is normal        Passed - Recent (6 mo) or future (90 days) visit within the authorizing provider's specialty     The patient must have completed an in-person or virtual visit within the past 6 months or has a future visit scheduled within the next 90 days with the authorizing provider s specialty.  Urgent care and e-visits do not quality as an office visit for this protocol.          Passed - Patient is not pregnant        Passed - Patient has not had a positive pregnancy test within the past 12 mos.            Refills sent  Ceci Lopez RN

## 2024-06-24 ENCOUNTER — PATIENT OUTREACH (OUTPATIENT)
Dept: CARE COORDINATION | Facility: CLINIC | Age: 59
End: 2024-06-24
Payer: COMMERCIAL

## 2024-06-29 ENCOUNTER — HEALTH MAINTENANCE LETTER (OUTPATIENT)
Age: 59
End: 2024-06-29

## 2024-08-29 ENCOUNTER — VIRTUAL VISIT (OUTPATIENT)
Dept: FAMILY MEDICINE | Facility: CLINIC | Age: 59
End: 2024-08-29
Payer: COMMERCIAL

## 2024-08-29 DIAGNOSIS — E78.5 HYPERLIPIDEMIA, UNSPECIFIED HYPERLIPIDEMIA TYPE: Chronic | ICD-10-CM

## 2024-08-29 DIAGNOSIS — E11.65 TYPE 2 DIABETES MELLITUS WITH HYPERGLYCEMIA, WITHOUT LONG-TERM CURRENT USE OF INSULIN (H): Primary | ICD-10-CM

## 2024-08-29 DIAGNOSIS — I10 ESSENTIAL HYPERTENSION: Chronic | ICD-10-CM

## 2024-08-29 PROCEDURE — 99213 OFFICE O/P EST LOW 20 MIN: CPT | Mod: 95 | Performed by: INTERNAL MEDICINE

## 2024-08-29 RX ORDER — ROSUVASTATIN CALCIUM 20 MG/1
20 TABLET, COATED ORAL AT BEDTIME
Qty: 90 TABLET | Refills: 1 | Status: SHIPPED | OUTPATIENT
Start: 2024-08-29

## 2024-08-29 RX ORDER — TRIAMTERENE AND HYDROCHLOROTHIAZIDE 37.5; 25 MG/1; MG/1
CAPSULE ORAL
Qty: 90 CAPSULE | Refills: 1 | Status: SHIPPED | OUTPATIENT
Start: 2024-08-29

## 2024-08-29 NOTE — PROGRESS NOTES
Susi is a 58 year old who is being evaluated via a billable video visit.    How would you like to obtain your AVS? MyChart  If the video visit is dropped, the invitation should be resent by: Text to cell phone: [phone]  Will anyone else be joining your video visit? No          Subjective   Susi is a 58 year old, presenting for the following health issues:  Refill Request        8/29/2024    11:40 AM   Additional Questions   Roomed by Margarito   Accompanied by Not applicable, by themselves     This is a very pleasant 58-year-old who is new to me.  She needs a refill on her medications.  She is doing quite well.  I reviewed the endocrine note.  I reviewed recent labs.  She has not been in here for over a year.  Her past medical history and past surgical histories are reviewed.  She does not smoke.  She is  and has no kids.  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  She is feeling well.    Vitals:  No vitals were obtained today due to virtual visit.    Physical Exam   GENERAL: alert and no distress  EYES: Eyes grossly normal to inspection.  No discharge or erythema, or obvious scleral/conjunctival abnormalities.  RESP: No audible wheeze, cough, or visible cyanosis.    SKIN: Visible skin clear. No significant rash, abnormal pigmentation or lesions.  NEURO: Cranial nerves grossly intact.  Mentation and speech appropriate for age.  PSYCH: Appropriate affect, tone, and pace of words    ASSESSMENT:  Hypertension, controlled  Hyperlipidemia, on statin therapy  Non-insulin-dependent diabetes, has follow-up with endocrine  Hypothyroidism, has follow-up with endocrine    PLAN:  Medications renewed  I recommend flu and COVID vaccines at pharmacy  She has a follow-up for a physical in November    Rishabh Alfonos M.D.          Video-Visit Details    Type of service:  Video Visit   Originating Location (pt. Location): Home    Distant Location (provider location):  On-site  Platform used for Video Visit:  Doximity  Signed Electronically by: Rishabh Alfonso MD

## 2024-09-07 ENCOUNTER — HEALTH MAINTENANCE LETTER (OUTPATIENT)
Age: 59
End: 2024-09-07

## 2024-09-19 ENCOUNTER — OFFICE VISIT (OUTPATIENT)
Dept: ENDOCRINOLOGY | Facility: CLINIC | Age: 59
End: 2024-09-19
Payer: COMMERCIAL

## 2024-09-19 ENCOUNTER — LAB (OUTPATIENT)
Dept: LAB | Facility: CLINIC | Age: 59
End: 2024-09-19
Payer: COMMERCIAL

## 2024-09-19 VITALS
SYSTOLIC BLOOD PRESSURE: 119 MMHG | DIASTOLIC BLOOD PRESSURE: 80 MMHG | HEART RATE: 67 BPM | BODY MASS INDEX: 34.97 KG/M2 | WEIGHT: 191.2 LBS

## 2024-09-19 DIAGNOSIS — E11.9 TYPE 2 DIABETES MELLITUS WITHOUT COMPLICATION, WITHOUT LONG-TERM CURRENT USE OF INSULIN (H): ICD-10-CM

## 2024-09-19 DIAGNOSIS — E06.3 HYPOTHYROIDISM DUE TO HASHIMOTO'S THYROIDITIS: ICD-10-CM

## 2024-09-19 DIAGNOSIS — E11.9 TYPE 2 DIABETES MELLITUS WITHOUT COMPLICATION, WITHOUT LONG-TERM CURRENT USE OF INSULIN (H): Primary | ICD-10-CM

## 2024-09-19 LAB
ALT SERPL W P-5'-P-CCNC: 22 U/L (ref 0–50)
ANION GAP SERPL CALCULATED.3IONS-SCNC: 13 MMOL/L (ref 7–15)
BUN SERPL-MCNC: 13.5 MG/DL (ref 6–20)
CALCIUM SERPL-MCNC: 9.6 MG/DL (ref 8.8–10.4)
CHLORIDE SERPL-SCNC: 101 MMOL/L (ref 98–107)
CHOLEST SERPL-MCNC: 165 MG/DL
CREAT SERPL-MCNC: 0.72 MG/DL (ref 0.51–0.95)
CREAT UR-MCNC: 61.1 MG/DL
EGFRCR SERPLBLD CKD-EPI 2021: >90 ML/MIN/1.73M2
EST. AVERAGE GLUCOSE BLD GHB EST-MCNC: 232 MG/DL
FASTING STATUS PATIENT QL REPORTED: YES
FASTING STATUS PATIENT QL REPORTED: YES
GLUCOSE SERPL-MCNC: 255 MG/DL (ref 70–99)
HBA1C MFR BLD: 9.7 % (ref 0–5.6)
HCO3 SERPL-SCNC: 26 MMOL/L (ref 22–29)
HDLC SERPL-MCNC: 31 MG/DL
LDLC SERPL CALC-MCNC: ABNORMAL MG/DL
LDLC SERPL DIRECT ASSAY-MCNC: 69 MG/DL
MICROALBUMIN UR-MCNC: 20.7 MG/L
MICROALBUMIN/CREAT UR: 33.88 MG/G CR (ref 0–25)
NONHDLC SERPL-MCNC: 134 MG/DL
POTASSIUM SERPL-SCNC: 4.3 MMOL/L (ref 3.4–5.3)
SODIUM SERPL-SCNC: 140 MMOL/L (ref 135–145)
TRIGL SERPL-MCNC: 450 MG/DL
TSH SERPL DL<=0.005 MIU/L-ACNC: 1.7 UIU/ML (ref 0.3–4.2)

## 2024-09-19 PROCEDURE — 82570 ASSAY OF URINE CREATININE: CPT

## 2024-09-19 PROCEDURE — 99214 OFFICE O/P EST MOD 30 MIN: CPT | Performed by: INTERNAL MEDICINE

## 2024-09-19 PROCEDURE — 83036 HEMOGLOBIN GLYCOSYLATED A1C: CPT

## 2024-09-19 PROCEDURE — 80061 LIPID PANEL: CPT

## 2024-09-19 PROCEDURE — 36415 COLL VENOUS BLD VENIPUNCTURE: CPT

## 2024-09-19 PROCEDURE — G2211 COMPLEX E/M VISIT ADD ON: HCPCS | Performed by: INTERNAL MEDICINE

## 2024-09-19 PROCEDURE — 82043 UR ALBUMIN QUANTITATIVE: CPT

## 2024-09-19 PROCEDURE — 84443 ASSAY THYROID STIM HORMONE: CPT

## 2024-09-19 PROCEDURE — 83721 ASSAY OF BLOOD LIPOPROTEIN: CPT

## 2024-09-19 PROCEDURE — 84460 ALANINE AMINO (ALT) (SGPT): CPT

## 2024-09-19 PROCEDURE — 80048 BASIC METABOLIC PNL TOTAL CA: CPT

## 2024-09-19 NOTE — PROGRESS NOTES
Recent issues:  Hypothyroidism and diabetes follow-up evaluation  Patient reports feeling well overall, no new health issues  She did not schedule the [facility] Diab Ed appt, also delay in reading her previous MyChart lab result message        Thyroid:  ~2006. Initial diagnosis of hypothyroidism   Had OBGYN for infertility evaluation with Dr. Kovacs/[facility]   Started levothyroxine medication  Typically taking levothyroxine 0.15 mg most often  No known history of thyroid nodules  FamHx thyroid disease: Sister- hypothyroidism    [facility]  Subsequently saw Dr. Korina Vera/[facility]  Dose reductions with levothyroxine medication.  7/2021. Dose reduction levothyroxine to 0.125 mg daily  Has felt less anxious and tired on that dose  Previous [facility] thyroid tests include:   Lab Test 07/13/21  0934 03/05/21  1148 11/24/20  0913 09/14/18  0000 04/02/18  1030   TSH 0.08* <0.01* 0.04* 3.590 2.05   T4 1.56* 1.57* 1.68*  --   --            10/6/21. Initial endocrinology evaluation with me at [place]  Reviewed health history, thyroid and diabetes endocrine issues  Lab testing, then dose increase of levothyroxine medication  Recent [facility] labs include:  Lab Results   Component Value Date    TSH 1.70 09/19/2024    T4 1.66 01/25/2024     (H) 12/10/2021     Current dose:  Levothyroxine 0.137 mg daily         Diabetes:  History of pre-diabetes  Took metformin during her infertility management with OBGYN, took for 1.5-2 yrs ~2006 Fall 2020. Diagnosis of diabetes mellitus  ?? 7/2021. Restarted metformin med use  11/2020. Labs showed elevated hgbA1c 6.8% indicating T2DM  Metformin dose changed as 500 mg 2-tabs BID, tolerates well   FamHx DM:  none  Previous [facility] hgbA1c trends include:   Lab Test 07/13/21  0934 03/05/21  1148 11/24/20  0913 03/29/19  0943 09/14/18  0000   A1C 6.5* 6.6* 6.8* 6.0* 6.0*     Current DM medications:    Metformin 500 mg as 2-tabs in morning and evening.    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 Korina [facility] for general medicine evaluations.  Had seen Dr. Kovacs/[facility]      PMH/PSH:  Past Medical History:   Diagnosis Date    Acquired hypothyroidism     Benign essential hypertension     on med since  or so    Hyperlipidemia     on rx since about     Type 2 diabetes mellitus without complication, without long-term current use of insulin (H)     metformin added in      Past Surgical History:   Procedure Laterality Date    LAPAROSCOPIC CHOLECYSTECTOMY  2014    Procedure: LAPAROSCOPIC CHOLECYSTECTOMY;  Surgeon: Chay Shafer MD;  Location:  OR       Family Hx:  Family History   Problem Relation Age of Onset    Hyperlipidemia Father     Family History Negative Mother          MVA     Breast Cancer Maternal Grandmother          Social Hx:  Social History     Socioeconomic History    Marital status:      Spouse name: Not on file    Number of children: 0    Years of education: Not on file    Highest education level: Not on file   Occupational History    Occupation: supervisor at [employer]   Tobacco Use    Smoking status: Never    Smokeless tobacco: Never   Vaping Use    Vaping status: Never Used   Substance and Sexual Activity    Alcohol use: Yes     Alcohol/week: 0.0 standard drinks of alcohol     Comment: rare    Drug use: No    Sexual activity: Yes     Partners: Male   Other Topics Concern    Parent/sibling w/ CABG, MI or angioplasty before 65F 55M? Not Asked   Social History Narrative    Not on file     Social Determinants of Health     Financial Resource Strain: Not on file   Food Insecurity: Not on file   Transportation Needs: Not on file   Physical Activity: Not on file   Stress: Not on file   Social Connections: Not on file    Interpersonal Safety: Not on file   Housing Stability: Not on file          MEDICATIONS:  has a current medication list which includes the following prescription(s): aspirin, cholecalciferol, fexofenadine, fish oil-omega-3 fatty acids, fluticasone, lactobacillus rhamnosus (gg), levothyroxine, magnesium, metformin, multivitamin w/minerals, rosuvastatin, triamterene-hctz, vitamin b complex with vitamin c, and blood pressure monitoring.    ROS:     ROS: 10 point ROS neg other than the symptoms noted above in the HPI.    GENERAL: some fatigue, wt stable; denies fevers, chills, night sweats.   HEENT: no dysphagia, odonophagia, diplopia, neck pain  THYROID:  no apparent hyper or hypothyroid symptoms  CV: no chest pain, pressure, palpitations  LUNGS: no SOB, BAKER, cough, wheezing   ABDOMEN: some BM urgency; no nausea, diarrhea, constipation, abdominal pain  EXTREMITIES: no rashes, ulcers, edema  NEUROLOGY: no headaches, denies changes in vision, tingling, extremitiy numbness   MSK: arthralgias at hands, knees; denies muscle weakness  SKIN: no rashes or lesions  :  no menses since age 54, no hot flashes  PSYCH:  stable mood, no significant anxiety or depression  ENDOCRINE: no heat or cold intolerance      Physical Exam   VS: /80   Pulse 67   Wt 86.7 kg (191 lb 3.2 oz)   LMP 09/29/2015 (Approximate)   BMI 34.97 kg/m    GENERAL: AXOX3, NAD, well dressed, answering questions appropriately, appears stated age.  ENT: no nose swelling or nasal discharge, mouth redness or gum changes.  EYES: eyes grossly normal to inspection, conjunctivae and sclerae normal, no exophthalmos or proptosis  THYROID:  no apparent nodules or goiter  LUNGS: no audible wheeze, cough or visible cyanosis, or increased work of breathing  ABDOMEN: abdomen obese size  EXTREMITIES: no edema noted  NEUROLOGY: CN grossly intact, no tremors  MSK: grossly intact  SKIN:  no apparent skin lesions, rash, or edema with visualized skin appearance  PSYCH:  mentation appears normal, affect normal/bright, judgement and insight intact,   normal speech and appearance well groomed    LABS:    All pertinent notes, labs, and images personally reviewed by me.     A/P:  Encounter Diagnoses   Name Primary?    Type 2 diabetes mellitus without complication, without long-term current use of insulin (H) Yes    Hypothyroidism due to Hashimoto's thyroiditis      Comments:  Reviewed health history, thyroid and diabetes issues.  Overdue for repeat hgbA1c lab testing and Diab Ed evaluation  Reviewed and interpreted tests that I previously ordered.   Ordered appropriate tests for the endocrinology disease management.    Management options discussed and implemented after shared medical decision making with the patient.  Diabetes problem is chronic with exacerbation progression, hyperglycemia     Plan:  Reviewed general issues with the hypothyroidism diagnosis and management  Discussed lab tests used to assess patient thyroid hormone levels  Reviewed use of thyroid medication for hypothyroidism treatment    Discussed general issues with the diabetes diagnosis and management  We discussed the hgbA1c test which reflects previous overall glucose levels or control  Discussed the importance of blood glucose (BG) testing to assess glucose trends  Provided general overview of the diabetes medication options and medication treatment plan.    Recommend:   Continue the current levothyroxine 0.137 mg daily dose plan, take daily on empty stomach  No thyroid U/S imaging needed at this time    Continue current metformin twice daily dose plan  Consider future med alternatives such as Januvia, Tradjenta, Victoza daily, Ozempic, Mounjaro vs Trulicity weekly, discussed  Goal target hgbA1c <7%  Keep focus on diet, exercise, weight loss management.  Plan to see [facility] Diab Educator soon   Review BGM testing, DPP4 vs GLP1RA med options, wear diagnostic Miguel   Another [facility] Diab Ed Referral placed  Plan fasting  labs soon   Testing at [facility]   Lab orders placed  Advise having fasting lipid panel testing and dilated eye examination, at least annually    Check with OBGYN or PCP physician regarding f/u DEXA imaging plan  Addressed patient questions today     The longitudinal plan of care for the endocrine problem(s) were addressed during this visit.  Due to added complexity of care,   we will continue to support the patient and the subsequent management of this condition with ongoing continuity of care.    There are no Patient Instructions on file for this visit.    Future labs ordered today:   Orders Placed This Encounter   Procedures    Hemoglobin A1c    Basic metabolic panel    ALT    Lipid panel reflex to direct LDL Fasting    Albumin Random Urine Quantitative with Creat Ratio    TSH with free T4 reflex    Adult Diabetes Education  Referral     Radiology/Consults ordered today: DIABETES EDUCATION ADULT  REFERRAL    Total time spent on day of encounter:  20 min    Follow-up:  1/20/25    JEROME Durham MD, MS  Endocrinology  [facility]

## 2024-09-26 ENCOUNTER — PATIENT OUTREACH (OUTPATIENT)
Dept: CARE COORDINATION | Facility: CLINIC | Age: 59
End: 2024-09-26
Payer: COMMERCIAL

## 2024-09-27 ENCOUNTER — DOCUMENTATION ONLY (OUTPATIENT)
Dept: LAB | Facility: CLINIC | Age: 59
End: 2024-09-27
Payer: COMMERCIAL

## 2024-09-27 NOTE — PROGRESS NOTES
Message reviewed.  This patient just had lab testing done 9/19/24.  I do not need additional lab tests at this time and don't know why another lab appointment was made (unless done for another provider).  Please relay message ASAP.    JEROME Durham MD, MS  Endocrinology  [facility]

## 2024-10-30 ENCOUNTER — OFFICE VISIT (OUTPATIENT)
Dept: FAMILY MEDICINE | Facility: CLINIC | Age: 59
End: 2024-10-30
Payer: COMMERCIAL

## 2024-10-30 VITALS
RESPIRATION RATE: 18 BRPM | SYSTOLIC BLOOD PRESSURE: 131 MMHG | DIASTOLIC BLOOD PRESSURE: 85 MMHG | TEMPERATURE: 98.4 F | HEIGHT: 62 IN | OXYGEN SATURATION: 95 % | BODY MASS INDEX: 35.5 KG/M2 | HEART RATE: 81 BPM | WEIGHT: 192.9 LBS

## 2024-10-30 DIAGNOSIS — E11.65 TYPE 2 DIABETES MELLITUS WITH HYPERGLYCEMIA, WITHOUT LONG-TERM CURRENT USE OF INSULIN (H): ICD-10-CM

## 2024-10-30 DIAGNOSIS — Z12.4 CERVICAL CANCER SCREENING: ICD-10-CM

## 2024-10-30 DIAGNOSIS — I10 ESSENTIAL HYPERTENSION: Chronic | ICD-10-CM

## 2024-10-30 DIAGNOSIS — Z00.00 ANNUAL PHYSICAL EXAM: Primary | ICD-10-CM

## 2024-10-30 DIAGNOSIS — Z12.11 SCREEN FOR COLON CANCER: ICD-10-CM

## 2024-10-30 DIAGNOSIS — E03.9 HYPOTHYROIDISM, UNSPECIFIED TYPE: Chronic | ICD-10-CM

## 2024-10-30 DIAGNOSIS — E66.01 MORBID OBESITY (H): ICD-10-CM

## 2024-10-30 DIAGNOSIS — E78.5 HYPERLIPIDEMIA, UNSPECIFIED HYPERLIPIDEMIA TYPE: Chronic | ICD-10-CM

## 2024-10-30 LAB
ERYTHROCYTE [DISTWIDTH] IN BLOOD BY AUTOMATED COUNT: 13 % (ref 10–15)
HCT VFR BLD AUTO: 45.2 % (ref 35–47)
HGB BLD-MCNC: 14.7 G/DL (ref 11.7–15.7)
MCH RBC QN AUTO: 27.1 PG (ref 26.5–33)
MCHC RBC AUTO-ENTMCNC: 32.5 G/DL (ref 31.5–36.5)
MCV RBC AUTO: 83 FL (ref 78–100)
PLATELET # BLD AUTO: 295 10E3/UL (ref 150–450)
RBC # BLD AUTO: 5.42 10E6/UL (ref 3.8–5.2)
WBC # BLD AUTO: 8.3 10E3/UL (ref 4–11)

## 2024-10-30 PROCEDURE — 85027 COMPLETE CBC AUTOMATED: CPT | Performed by: INTERNAL MEDICINE

## 2024-10-30 PROCEDURE — G0145 SCR C/V CYTO,THINLAYER,RESCR: HCPCS | Performed by: INTERNAL MEDICINE

## 2024-10-30 PROCEDURE — 99214 OFFICE O/P EST MOD 30 MIN: CPT | Mod: 25 | Performed by: INTERNAL MEDICINE

## 2024-10-30 PROCEDURE — 99396 PREV VISIT EST AGE 40-64: CPT | Performed by: INTERNAL MEDICINE

## 2024-10-30 PROCEDURE — 36415 COLL VENOUS BLD VENIPUNCTURE: CPT | Performed by: INTERNAL MEDICINE

## 2024-10-30 PROCEDURE — 87624 HPV HI-RISK TYP POOLED RSLT: CPT | Performed by: INTERNAL MEDICINE

## 2024-10-30 RX ORDER — LANCETS 30 GAUGE
EACH MISCELLANEOUS
Qty: 100 EACH | Refills: 6 | Status: CANCELLED | OUTPATIENT
Start: 2024-10-30

## 2024-10-30 RX ORDER — LANCETS
EACH MISCELLANEOUS
Qty: 100 EACH | Refills: 6 | Status: SHIPPED | OUTPATIENT
Start: 2024-10-30 | End: 2024-11-01

## 2024-10-30 SDOH — HEALTH STABILITY: PHYSICAL HEALTH: ON AVERAGE, HOW MANY DAYS PER WEEK DO YOU ENGAGE IN MODERATE TO STRENUOUS EXERCISE (LIKE A BRISK WALK)?: 4 DAYS

## 2024-10-30 SDOH — HEALTH STABILITY: PHYSICAL HEALTH: ON AVERAGE, HOW MANY MINUTES DO YOU ENGAGE IN EXERCISE AT THIS LEVEL?: 60 MIN

## 2024-10-30 ASSESSMENT — PAIN SCALES - GENERAL: PAINLEVEL_OUTOF10: NO PAIN (0)

## 2024-10-30 ASSESSMENT — SOCIAL DETERMINANTS OF HEALTH (SDOH): HOW OFTEN DO YOU GET TOGETHER WITH FRIENDS OR RELATIVES?: MORE THAN THREE TIMES A WEEK

## 2024-10-30 NOTE — PROGRESS NOTES
"Preventive Care Visit  [facility] SHAAN Naranjo MD, Internal Medicine  Oct 30, 2024      Assessment & Plan     Annual physical exam  - CBC with Platelets (Today)    Screen for colon cancer  - Colonoscopy Screening  Referral; Future    Cervical cancer screening  - HPV and Gynecologic Cytology Panel - Recommended Age 30 - 65 Years  - Gynecologic Cytology (PAP)    Type 2 diabetes mellitus with hyperglycemia, without long-term current use of insulin (H)  - OPTOMETRY REFERRAL; Future  - blood glucose monitoring (NO BRAND SPECIFIED) meter device kit; Use to test blood sugar daily or as directed. Preferred blood glucose meter OR supplies to accompany: Blood Glucose Monitor Brands: per insurance.  - blood glucose (NO BRAND SPECIFIED) test strip; Use to test blood sugar daily or as directed. To accompany: Blood Glucose Monitor Brands: per insurance.    Morbid obesity (H)  Discussed diet and exercise   Talked about glp-1 agonist for management of diabetes and weight loss.    Hypothyroidism, unspecified type  Stable. Continue medication.    Hyperlipidemia, unspecified hyperlipidemia type  Stable. Continue medication.  LDL goal discussed     Essential hypertension  Stable. Continue triamterene-hydrochlorothiazide      Patient has been advised of split billing requirements and indicates understanding: Yes        BMI  Estimated body mass index is 35.28 kg/m  as calculated from the following:    Height as of this encounter: 1.575 m (5' 2\").    Weight as of this encounter: 87.5 kg (192 lb 14.4 oz).   Weight management plan: Discussed healthy diet and exercise guidelines    Counseling  Appropriate preventive services were addressed with this patient via screening, questionnaire, or discussion as appropriate for fall prevention, nutrition, physical activity, Tobacco-use cessation, social engagement, weight loss and cognition.  Checklist reviewing preventive services available has been given to the " patient.  Reviewed patient's diet, addressing concerns and/or questions.       See Patient Instructions    Subjective   Susi is a 59 year old, presenting for the following:  Physical and Establish Care       History of Present Illness       Reason for visit:  Establish new provider   She is taking medications regularly.    Susi Rossi is here for APE    Recent A1c: 9.7%   Glucose Control:   Medications: on metformin 1000 mg bid.  Lipids:   Blood Pressure:    Diabetic Nephropathy screening: diabetic nephropathy   Diabetic Retinopathy screening: due for retinal exam  Diabetic Neuropathy screening:  due for foot exam       Fam hx: breast cancer in maternal aunts and cousins.   Cousin with uterine cancer.     Nonsmoker  ETOH: occasional      Health Care Directive  Patient does not have a Health Care Directive: Discussed advance care planning with patient; however, patient declined at this time.      10/30/2024   General Health   How would you rate your overall physical health? Good   Feel stress (tense, anxious, or unable to sleep) Not at all            10/30/2024   Nutrition   Three or more servings of calcium each day? Yes   Diet: Regular (no restrictions)   How many servings of fruit and vegetables per day? (!) 2-3   How many sweetened beverages each day? 0-1            10/30/2024   Exercise   Days per week of moderate/strenous exercise 4 days   Average minutes spent exercising at this level 60 min            10/30/2024   Social Factors   Frequency of gathering with friends or relatives More than three times a week   Worry food won't last until get money to buy more No   Food not last or not have enough money for food? No   Do you have housing? (Housing is defined as stable permanent housing and does not include staying ouside in a car, in a tent, in an abandoned building, in an overnight shelter, or couch-surfing.) Yes   Are you worried about losing your housing? No   Lack of transportation? No    Unable to get utilities (heat,electricity)? No            10/30/2024   Fall Risk   Fallen 2 or more times in the past year? No    Trouble with walking or balance? No        Patient-reported          10/30/2024   Dental   Dentist two times every year? Yes            10/30/2024   TB Screening   Were you born outside of the [place]? No          Today's PHQ-2 Score:       1/15/2024     8:54 AM   PHQ-2 ( 1999 Pf[employer])   Q1: Little interest or pleasure in doing things 0   Q2: Feeling down, depressed or hopeless 0   PHQ-2 Score 0         10/30/2024   Substance Use   Alcohol more than 3/day or more than 7/wk No   Do you use any other substances recreationally? No        Social History     Tobacco Use    Smoking status: Never    Smokeless tobacco: Never   Vaping Use    Vaping status: Never Used   Substance Use Topics    Alcohol use: Yes     Alcohol/week: 0.0 standard drinks of alcohol     Comment: rare    Drug use: No           7/23/2023   LAST [facility]HS-7 RESULTS   1st degree relative breast or ovarian cancer Yes    Any relative bilateral breast cancer Unknown    Any male have breast cancer No    Any ONE woman have BOTH breast AND ovarian cancer Yes    Any woman with breast cancer before 50yrs Unknown    2 or more relatives with breast AND/OR ovarian cancer Yes    2 or more relatives with breast AND/OR bowel cancer Yes        Patient-reported     Mammogram Screening - Mammogram every 1-2 years updated in Health Maintenance based on mutual decision making        10/30/2024   STI Screening   New sexual partner(s) since last STI/HIV test? No        History of abnormal Pap smear: No - age 30- 64 PAP with HPV every 5 years recommended       ASCVD Risk   The 10-year ASCVD risk score (Juan Carlos JURADO, et al., 2019) is: 7.6%    Values used to calculate the score:      Age: 59 years      Sex: Female      Is Non- : No      Diabetic: Yes      Tobacco smoker: No      Systolic Blood Pressure: 131 mmHg      Is BP  "treated: No      HDL Cholesterol: 31 mg/dL      Total Cholesterol: 165 mg/dL    Fracture Risk Assessment Tool  Link to Frax Calculator  Use the information below to complete the Frax calculator  : 1965  Sex: female  Weight (kg): 87.5 kg (actual weight)  Height (cm): 157.5 cm  Previous Fragility Fracture:  No  History of parent with fractured hip:  No  Current Smoking:  No  Patient has been on glucocorticoids for more than 3 months (5mg/day or more): No  Rheumatoid Arthritis on Problem List:  No  Secondary Osteoporosis on Problem List:  No  Consumes 3 or more units of alcohol per day: No  Femoral Neck BMD (g/cm2)    Reviewed and updated as needed this visit by Provider     Meds                 Objective    Exam  /85 (BP Location: Right arm, Patient Position: Sitting, Cuff Size: Adult Regular)   Pulse 81   Temp 98.4  F (36.9  C) (Oral)   Resp 18   Ht 1.575 m (5' 2\")   Wt 87.5 kg (192 lb 14.4 oz)   LMP 2015 (Approximate)   SpO2 95%   BMI 35.28 kg/m     Estimated body mass index is 35.28 kg/m  as calculated from the following:    Height as of this encounter: 1.575 m (5' 2\").    Weight as of this encounter: 87.5 kg (192 lb 14.4 oz).    Physical Exam  Vitals reviewed.   Constitutional:       Appearance: Normal appearance.   HENT:      Right Ear: Tympanic membrane normal. There is no impacted cerumen.      Left Ear: Tympanic membrane normal. There is no impacted cerumen.      Mouth/Throat:      Mouth: Mucous membranes are moist.      Pharynx: Oropharynx is clear. No oropharyngeal exudate or posterior oropharyngeal erythema.   Cardiovascular:      Rate and Rhythm: Normal rate and regular rhythm.      Heart sounds: Normal heart sounds. No murmur heard.     No gallop.   Pulmonary:      Effort: Pulmonary effort is normal. No respiratory distress.      Breath sounds: Normal breath sounds. No stridor. No wheezing or rales.   Chest:   Breasts:     Right: No swelling, bleeding, inverted nipple, mass, " nipple discharge, skin change or tenderness.      Left: No swelling, bleeding, inverted nipple, mass, nipple discharge, skin change or tenderness.   Abdominal:      General: Abdomen is flat. There is no distension.      Palpations: Abdomen is soft. There is no mass.      Tenderness: There is no abdominal tenderness. There is no guarding.      Hernia: No hernia is present.   Genitourinary:     Vagina: Normal.      Cervix: Normal.   Musculoskeletal:         General: Normal range of motion.      Cervical back: Normal range of motion and neck supple. No rigidity or tenderness.      Right lower leg: No edema.      Left lower leg: No edema.   Lymphadenopathy:      Cervical: No cervical adenopathy.   Skin:     General: Skin is warm and dry.   Neurological:      Mental Status: She is alert.               Signed Electronically by: Ashleigh Naranjo MD

## 2024-10-30 NOTE — PATIENT INSTRUCTIONS
Fasting blood sugar:     Post-meal (2 hours after meal): 140-180    You are due for mammogram.  Please call the following number to make appointment :  [phone]  It is located in [address]

## 2024-11-01 LAB
HPV HR 12 DNA CVX QL NAA+PROBE: NEGATIVE
HPV16 DNA CVX QL NAA+PROBE: NEGATIVE
HPV18 DNA CVX QL NAA+PROBE: NEGATIVE
HUMAN PAPILLOMA VIRUS FINAL DIAGNOSIS: NORMAL

## 2024-11-01 RX ORDER — LANCETS
EACH MISCELLANEOUS
Qty: 100 EACH | Refills: 5 | Status: SHIPPED | OUTPATIENT
Start: 2024-11-01 | End: 2024-11-04

## 2024-11-04 RX ORDER — LANCETS
EACH MISCELLANEOUS
Qty: 100 EACH | Refills: 5 | Status: SHIPPED | OUTPATIENT
Start: 2024-11-04

## 2024-11-05 LAB
BKR AP ASSOCIATED HPV REPORT: NORMAL
BKR LAB AP GYN ADEQUACY: NORMAL
BKR LAB AP GYN INTERPRETATION: NORMAL
BKR LAB AP PREVIOUS ABNORMAL: NORMAL
PATH REPORT.COMMENTS IMP SPEC: NORMAL
PATH REPORT.COMMENTS IMP SPEC: NORMAL
PATH REPORT.RELEVANT HX SPEC: NORMAL

## 2024-12-22 DIAGNOSIS — E11.9 TYPE 2 DIABETES MELLITUS WITHOUT COMPLICATION, WITHOUT LONG-TERM CURRENT USE OF INSULIN (H): Chronic | ICD-10-CM

## 2024-12-23 NOTE — TELEPHONE ENCOUNTER
Last Written Prescription Date:  4/1/24  Last Fill Quantity: 360,  # refills: 1   Last office visit: 9/19/2024 ; last virtual visit: Visit date not found with prescribing provider:  Dr. Durham   Future Office Visit:  1/20/25    Requested Prescriptions   Pending Prescriptions Disp Refills    metFORMIN (GLUCOPHAGE) 500 MG tablet [Pharmacy Med Name: METFORMIN  MG TABLET] 60 tablet 11     Sig: TAKE 2 TABLETS BY MOUTH TWICE A DAY WITH MEALS       Biguanide Agents Failed - 12/23/2024 10:52 AM        Failed - Patient has documented A1c within the specified period of time.     If HgbA1C is 8 or greater, it needs to be on file within the past 3 months.  If less than 8, must be on file within the past 6 months.     Recent Labs   Lab Test 09/19/24  1100   A1C 9.7*             Passed - Patient is age 10 or older        Passed - Patient does NOT have a diagnosis of CHF.        Passed - Medication is active on med list        Passed - Medication indicated for associated diagnosis     Medication is associated with one or more of the following diagnoses:     Gestational diabetes mellitus     Hyperinsulinar obesity     Hypersecretion of ovarian androgens    Non-alcoholic fatty liver    Polycystic ovarian syndrome               Pre-diabetes (DM 2 prevention)    Type 2 diabetes mellitus     Weight gain, antipsychotic therapy-induced    Impaired fasting glucose          Passed - Has GFR on file in past 12 months and most recent value is normal        Passed - Recent (6 mo) or future (90 days) visit within the authorizing provider's specialty     The patient must have completed an in-person or virtual visit within the past 6 months or has a future visit scheduled within the next 90 days with the authorizing provider s specialty.  Urgent care and e-visits do not quality as an office visit for this protocol.          Passed - Patient is not pregnant        Passed - Patient has not had a positive pregnancy test within the past 12  mos.            Refills sent  Ceci Lopez RN

## 2025-01-04 ENCOUNTER — HEALTH MAINTENANCE LETTER (OUTPATIENT)
Age: 60
End: 2025-01-04

## 2025-01-18 DIAGNOSIS — E06.3 HYPOTHYROIDISM DUE TO HASHIMOTO'S THYROIDITIS: ICD-10-CM

## 2025-01-20 ENCOUNTER — OFFICE VISIT (OUTPATIENT)
Dept: ENDOCRINOLOGY | Facility: CLINIC | Age: 60
End: 2025-01-20
Payer: COMMERCIAL

## 2025-01-20 VITALS
HEART RATE: 82 BPM | BODY MASS INDEX: 34.75 KG/M2 | WEIGHT: 190 LBS | SYSTOLIC BLOOD PRESSURE: 130 MMHG | DIASTOLIC BLOOD PRESSURE: 86 MMHG

## 2025-01-20 DIAGNOSIS — E06.3 HYPOTHYROIDISM DUE TO HASHIMOTO'S THYROIDITIS: Primary | ICD-10-CM

## 2025-01-20 DIAGNOSIS — E11.9 TYPE 2 DIABETES MELLITUS WITHOUT COMPLICATION, WITHOUT LONG-TERM CURRENT USE OF INSULIN (H): ICD-10-CM

## 2025-01-20 PROCEDURE — 99214 OFFICE O/P EST MOD 30 MIN: CPT | Performed by: INTERNAL MEDICINE

## 2025-01-20 PROCEDURE — G2211 COMPLEX E/M VISIT ADD ON: HCPCS | Performed by: INTERNAL MEDICINE

## 2025-01-20 RX ORDER — LEVOTHYROXINE SODIUM 137 UG/1
137 TABLET ORAL DAILY
Qty: 30 TABLET | Refills: 5 | Status: SHIPPED | OUTPATIENT
Start: 2025-01-20

## 2025-01-20 NOTE — TELEPHONE ENCOUNTER
Requested Prescriptions   Pending Prescriptions Disp Refills    levothyroxine (SYNTHROID/LEVOTHROID) 137 MCG tablet [Pharmacy Med Name: LEVOTHYROXINE 137 MCG TABLET] 30 tablet 11     Sig: TAKE 1 TABLET BY MOUTH EVERY DAY       Thyroid Protocol Passed - 1/20/2025  9:26 AM        Passed - Patient is 12 years or older        Passed - Medication is active on med list        Passed - Recent (12 mo) or future (90 days) visit within the authorizing provider's specialty     The patient must have completed an in-person or virtual visit within the past 12 months or has a future visit scheduled within the next 90 days with the authorizing provider s specialty.  Urgent care and e-visits do not qualify as an office visit for this protocol.          Passed - Medication indicated for associated diagnosis     Medication is associated with one or more of the following diagnoses:  Hypothyroidism  Thyroid stimulating hormone suppression therapy  Thyroid cancer  Acquired atrophy of thyroid          Passed - Normal TSH on file in past 12 months     Recent Labs   Lab Test 09/19/24  1100   TSH 1.70              Passed - No active pregnancy on record     If patient is pregnant or has had a positive pregnancy test, please check TSH.          Passed - No positive pregnancy test in past 12 months     If patient is pregnant or has had a positive pregnancy test, please check TSH.             Last Written Prescription Date:  2/12/24  Last Fill Quantity: 90 tab,  # refills: 3   Last office visit: 9/19/2024 ; last virtual visit: Visit date not found with prescribing provider:  Dr Durham   Future Office Visit:  1/20/25

## 2025-01-20 NOTE — PROGRESS NOTES
Recent issues:  Hypothyroidism and diabetes follow-up evaluation  Patient reports feeling well overall, no new health issues  Has had high hgbA1c trends, did not schedule the [facility] Ed appt as advised in 9/2024        Thyroid:  ~2006. Initial diagnosis of hypothyroidism   Had OBGYN for infertility evaluation with Dr. Kovacs/[facility]   Started levothyroxine medication  Typically taking levothyroxine 0.15 mg most often  No known history of thyroid nodules  FamHx thyroid disease: Sister- hypothyroidism    Had seen Dr. DANITZA Todd/[facility] clinic  Subsequently saw Dr. Korina Vera/[facility] clinic  Dose reductions with levothyroxine medication.  7/2021. Dose reduction levothyroxine to 0.125 mg daily  Has felt less anxious and tired on that dose  Previous [facility] thyroid tests include:   Lab Test 07/13/21  0934 03/05/21  1148 11/24/20  0913 09/14/18  0000 04/02/18  1030   TSH 0.08* <0.01* 0.04* 3.590 2.05   T4 1.56* 1.57* 1.68*  --   --            10/6/21. Initial endocrinology evaluation with me at [place]  Reviewed health history, thyroid and diabetes endocrine issues  Lab testing, then dose increase of levothyroxine medication  Recent [facility] labs include:  Lab Results   Component Value Date    TSH 1.70 09/19/2024    T4 1.66 01/25/2024     (H) 12/10/2021     Current dose:  Levothyroxine 0.137 mg daily      Diabetes:  History of pre-diabetes  Took metformin during her infertility management with OBGYN, took for 1.5-2 yrs ~2006 Fall 2020. Diagnosis of diabetes mellitus  ?? 7/2021. Restarted metformin med use  11/2020. Labs showed elevated hgbA1c 6.8% indicating T2DM  Metformin dose changed as 500 mg 2-tabs BID, tolerates well   FamHx DM:  none  Previous [facility] hgbA1c trends include:   Lab Test 07/13/21  0934 03/05/21  1148 11/24/20  0913 03/29/19  0943 09/14/18  0000   A1C 6.5* 6.6* 6.8* 6.0* 6.0*     Current DM medications:    Metformin 500 mg as 2-tabs in morning and evening.    Blood glucose (BG) meter:  none   No BGM testing  or CGM use  Previous [facility] hgbA1c trends include:  Lab Results   Component Value Date    A1C 9.7 (H) 2024    A1C 8.2 (H) 2024    A1C 8.1 (H) 2023    A1C 6.6 (H) 12/10/2021    A1C 6.5 (H) 2021        Recent [facility] labs include:  Lab Results   Component Value Date    A1C 9.7 (H) 2024     2024    POTASSIUM 4.3 2024    CHLORIDE 101 2024    CO2 26 2024    ANIONGAP 13 2024     (H) 2024    BUN 13.5 2024    CR 0.72 2024    GFRESTIMATED >90 2024    GFRESTBLACK >90 2020    MICKY 9.6 2024    CHOL 165 2024    TRIG 450 (H) 2024    HDL 31 (L) 2024    LDL  2024      Comment:      Cannot estimate LDL when triglyceride exceeds 400 mg/dL    LDL 69 2024    NHDL 134 (H) 2024    UCRR 61.1 2024    MICROL 20.7 2024    UMALCR 33.88 (H) 2024     DM Complications: none      Lives in [place], [place], works as supervisor at [employer])  Had seen Dr. Korina Vera/[facility] for general medicine evaluations.  Had seen Dr. Kovasc/[facility]      PMH/PSH:  Past Medical History:   Diagnosis Date    Acquired hypothyroidism 2006    Benign essential hypertension     on med since 2016 or so    Hyperlipidemia     on rx since about     Type 2 diabetes mellitus without complication, without long-term current use of insulin (H)     metformin added in      Past Surgical History:   Procedure Laterality Date    LAPAROSCOPIC CHOLECYSTECTOMY  2014    Procedure: LAPAROSCOPIC CHOLECYSTECTOMY;  Surgeon: Chay Shafer MD;  Location:  OR       Family Hx:  Family History   Problem Relation Age of Onset    Hyperlipidemia Father     Family History Negative Mother          MVA     Breast Cancer Maternal Grandmother          Social Hx:  Social History     Socioeconomic History    Marital status:      Spouse name: Not on file    Number of children: 0    Years  of education: Not on file    Highest education level: Not on file   Occupational History    Occupation: supervisor at [employer]   Tobacco Use    Smoking status: Never    Smokeless tobacco: Never   Vaping Use    Vaping status: Never Used   Substance and Sexual Activity    Alcohol use: Yes     Alcohol/week: 0.0 standard drinks of alcohol     Comment: rare    Drug use: No    Sexual activity: Yes     Partners: Male   Other Topics Concern    Parent/sibling w/ CABG, MI or angioplasty before 65F 55M? Not Asked   Social History Narrative    Not on file     Social Drivers of Health     Financial Resource Strain: Low Risk  (10/30/2024)    Financial Resource Strain     Within the past 12 months, have you or your family members you live with been unable to get utilities (heat, electricity) when it was really needed?: No   Food Insecurity: Low Risk  (10/30/2024)    Food Insecurity     Within the past 12 months, did you worry that your food would run out before you got money to buy more?: No     Within the past 12 months, did the food you bought just not last and you didn t have money to get more?: No   Transportation Needs: Low Risk  (10/30/2024)    Transportation Needs     Within the past 12 months, has lack of transportation kept you from medical appointments, getting your medicines, non-medical meetings or appointments, work, or from getting things that you need?: No   Physical Activity: Sufficiently Active (10/30/2024)    Exercise Vital Sign     Days of Exercise per Week: 4 days     Minutes of Exercise per Session: 60 min   Stress: No Stress Concern Present (10/30/2024)    [employer] [place] [employer] - Occupational Stress Questionnaire     Feeling of Stress : Not at all   Social Connections: Unknown (10/30/2024)    Social Connection and Isolation Panel [NHANES]     Frequency of Communication with Friends and Family: Not on file     Frequency of Social Gatherings with Friends and Family: More than three times a  week     Attends Yazidism Services: Not on file     Active Member of Clubs or Organizations: Not on file     Attends Club or Organization Meetings: Not on file     Marital Status: Not on file   Interpersonal Safety: Low Risk  (10/30/2024)    Interpersonal Safety     Do you feel physically and emotionally safe where you currently live?: Yes     Within the past 12 months, have you been hit, slapped, kicked or otherwise physically hurt by someone?: No     Within the past 12 months, have you been humiliated or emotionally abused in other ways by your partner or ex-partner?: No   Housing Stability: Low Risk  (10/30/2024)    Housing Stability     Do you have housing? : Yes     Are you worried about losing your housing?: No          MEDICATIONS:  has a current medication list which includes the following prescription(s): aspirin, cholecalciferol, cyanocobalamin, fexofenadine, fish oil-omega-3 fatty acids, fluticasone, lactobacillus rhamnosus (gg), levothyroxine, magnesium, metformin, multivitamin w/minerals, rosuvastatin, triamterene-hctz, turmeric, blood glucose, blood glucose monitoring, blood pressure monitoring, and thin.    ROS:     ROS: 10 point ROS neg other than the symptoms noted above in the HPI.    GENERAL: some fatigue, gradual wt loss; denies fevers, chills, night sweats.   HEENT: no dysphagia, odonophagia, diplopia, neck pain  THYROID:  no apparent hyper or hypothyroid symptoms  CV: no chest pain, pressure, palpitations  LUNGS: no SOB, BAKER, cough, wheezing   ABDOMEN: some BM urgency; no nausea, diarrhea, constipation, abdominal pain  EXTREMITIES: no rashes, ulcers, edema  NEUROLOGY: no headaches, denies changes in vision, tingling, extremitiy numbness   MSK: arthralgias at hands, knees; denies muscle weakness  SKIN: no rashes or lesions  :  no menses since age 54, no hot flashes  PSYCH:  stable mood, no significant anxiety or depression  ENDOCRINE: no heat or cold intolerance      Physical Exam   VS: BP  130/86   Pulse 82   Wt 86.2 kg (190 lb)   LMP 09/29/2015 (Approximate)   BMI 34.75 kg/m    GENERAL: AXOX3, NAD, well dressed, answering questions appropriately, appears stated age.  ENT: no nose swelling or nasal discharge, mouth redness or gum changes.  EYES: eyes grossly normal to inspection, conjunctivae and sclerae normal, no exophthalmos or proptosis  THYROID:  no apparent nodules or goiter  LUNGS: no audible wheeze, cough or visible cyanosis, or increased work of breathing  ABDOMEN: abdomen obese size  EXTREMITIES: feet not examined today, no pedal edema noted  NEUROLOGY: CN grossly intact, no tremors  MSK: grossly intact  SKIN:  no apparent skin lesions, rash, or edema with visualized skin appearance  PSYCH: mentation appears normal, affect normal/bright, judgement and insight intact,   normal speech and appearance well groomed    LABS:    All pertinent notes, labs, and images personally reviewed by me.     A/P:  Encounter Diagnoses   Name Primary?    Hypothyroidism due to Hashimoto's thyroiditis Yes    Type 2 diabetes mellitus without complication, without long-term current use of insulin (H)        Comments:  Reviewed health history, thyroid and diabetes issues.  Overdue for repeat hgbA1c lab testing and Diab Ed evaluation  Reviewed and interpreted tests that I previously ordered.   Ordered appropriate tests for the endocrinology disease management.    Management options discussed and implemented after shared medical decision making with the patient.  Diabetes problem is chronic with exacerbation progression, hyperglycemia     Plan:  Reviewed general issues with the hypothyroidism diagnosis and management  Discussed lab tests used to assess patient thyroid hormone levels  Reviewed use of thyroid medication for hypothyroidism treatment    Discussed general issues with the diabetes diagnosis and management  We discussed the hgbA1c test which reflects previous overall glucose levels or control  Discussed  the importance of blood glucose (BG) testing to assess glucose trends  Provided general overview of the diabetes medication options and medication treatment plan.    Recommend:   Continue the current levothyroxine 0.137 mg daily dose plan, take daily on empty stomach  No thyroid U/S imaging needed at this time    Continue current metformin twice daily dose plan  Needs to consider alternatives such as Januvia, Tradjenta, Victoza daily, Ozempic, Mounjaro vs Trulicity weekly   I suspect she'd prefer to use oral T2DM meds at this time   Plan to see one of our [facility] diabetes educators soon, new Referral placed again   Needs to learn BGM testing routine, also consider placement of a Diagnostic Miguel  Goal target hgbA1c <7%  Keep focus on diet, exercise, weight loss management.  Plan non-fasting labs soon   Testing at [facility] clinic   Lab orders placed  Advise having fasting lipid panel testing and dilated eye examination, at least annually    Check with OBGYN or PCP physician regarding f/u DEXA imaging plan  Addressed patient questions today     The longitudinal plan of care for the endocrine problem(s) were addressed during this visit.  Due to added complexity of care,   we will continue to support the patient and the subsequent management of this condition with ongoing continuity of care.    There are no Patient Instructions on file for this visit.    Future labs ordered today:   Orders Placed This Encounter   Procedures    Hemoglobin A1c    Basic metabolic panel    Albumin Random Urine Quantitative with Creat Ratio    TSH with free T4 reflex    Adult Diabetes Education  Referral     Radiology/Consults ordered today: DIABETES EDUCATION ADULT  REFERRAL    Total time spent on day of encounter:  20 min    Follow-up:  5/2025, Return    JEROME Durham MD, MS  Endocrinology  [facility]

## 2025-04-08 ENCOUNTER — TELEPHONE (OUTPATIENT)
Dept: GASTROENTEROLOGY | Facility: CLINIC | Age: 60
End: 2025-04-08
Payer: COMMERCIAL

## 2025-04-08 NOTE — TELEPHONE ENCOUNTER
Caller: Susi Rossi      Reason for Reschedule/Cancellation (please be detailed, any staff messages or encounters to note?):   SCHEDULING CONFLICT    Did you cancel or rescheduled an EUS procedure? No.    Is screening questionnaire older than 3 months from the reschedule date.   If Yes, please complete screening questionnaire. No    Prior to reschedule please review:  Ordering Provider: Ashleigh Naranjo MD   Sedation Determined: MODERATE  Does patient have any ASC Exclusions, please identify?: Y - OSEAS    Notes on Cancelled Procedure:  Procedure: Lower Endoscopy [Colonoscopy]   Date: 04/21/2025  Location: [facility]; [address] [place] [address]   Surgeon: SUN    Rescheduled: Yes,   Procedure: Lower Endoscopy [Colonoscopy]    Date: 05/12/2025   Location: [facility]; [address] [place] [address]    Surgeon: SUN   Sedation Level Scheduled  MODERATE ,  Reason for Sedation Level PER ORDER   Instructions updated and sent: VIA [employer]     Does patient need PAC or Pre -Op Rescheduled? : NO

## 2025-04-09 ENCOUNTER — HOSPITAL ENCOUNTER (OUTPATIENT)
Dept: MAMMOGRAPHY | Facility: CLINIC | Age: 60
Discharge: HOME | End: 2025-04-09
Attending: INTERNAL MEDICINE
Payer: COMMERCIAL

## 2025-04-09 DIAGNOSIS — Z12.31 VISIT FOR SCREENING MAMMOGRAM: ICD-10-CM

## 2025-04-09 PROCEDURE — 77063 BREAST TOMOSYNTHESIS BI: CPT

## 2025-04-09 PROCEDURE — 77067 SCR MAMMO BI INCL CAD: CPT

## 2025-04-10 ENCOUNTER — MYC REFILL (OUTPATIENT)
Dept: FAMILY MEDICINE | Facility: CLINIC | Age: 60
End: 2025-04-10
Payer: COMMERCIAL

## 2025-04-10 DIAGNOSIS — I10 ESSENTIAL HYPERTENSION: Chronic | ICD-10-CM

## 2025-04-10 RX ORDER — TRIAMTERENE AND HYDROCHLOROTHIAZIDE 37.5; 25 MG/1; MG/1
CAPSULE ORAL
Qty: 90 CAPSULE | Refills: 1 | Status: SHIPPED | OUTPATIENT
Start: 2025-04-10

## 2025-04-10 RX ORDER — TRIAMTERENE AND HYDROCHLOROTHIAZIDE 37.5; 25 MG/1; MG/1
CAPSULE ORAL
Qty: 90 CAPSULE | Refills: 1 | OUTPATIENT
Start: 2025-04-10

## 2025-04-14 ENCOUNTER — ALLIED HEALTH/NURSE VISIT (OUTPATIENT)
Dept: EDUCATION SERVICES | Facility: CLINIC | Age: 60
End: 2025-04-14
Attending: INTERNAL MEDICINE
Payer: COMMERCIAL

## 2025-04-14 DIAGNOSIS — E11.9 TYPE 2 DIABETES MELLITUS WITHOUT COMPLICATION, WITHOUT LONG-TERM CURRENT USE OF INSULIN (H): ICD-10-CM

## 2025-04-14 PROCEDURE — G0108 DIAB MANAGE TRN  PER INDIV: HCPCS | Performed by: DIETITIAN, REGISTERED

## 2025-04-14 PROCEDURE — 99207 PR DROP WITH A PROCEDURE: CPT | Performed by: DIETITIAN, REGISTERED

## 2025-04-14 NOTE — PROGRESS NOTES
Diabetes Self-Management Education & Support    Presents for: Professional CGM Start    Type of Service: In Person Visit      Assessment  Susi is wondering why her A1C keeps going up. She would like to avoid more medications and work on lifestyle.  Currently, she is fasting for 15-17 hrs per day on average (intermittent fasting). Has been doing for the past 3 months most days.    She usually has a small lunch and then a bigger dinner. Bed is about 3 hours after dinner. She works for [employer] so has nuggets usually for lunch. Would benefit from reducing her processed food intake and including more whole foods with an emphasis on balance with carb, protein, veggies at meals. She tries to have balance at her dinner meal and eat healthier.     Would recommend another medication such as a GLP1 to be added.  Given her interest in lifestyle change, also discussed option of a personal CGM to better see impact of different foods/meals/exercise on her glucose and help facilitate change. She would like to see how the pro CGM goes first. Did discuss that insurance often does not cover personal CGMs if someone is not on insulin but that  we  can  do the Miguel with a discount code.  Emphasized diabetes pathophysiology and progression in our visit and why medications are usually added the longer someone has diabetes. She has never actually had diabetes education before.    Dexcom G6 pro-specific education provided on: Sensor insertion, intention of monitoring for 10 days. Keep records of BG, food intake, exercise, and medication dosing during wear.    Sensor was inserted with no resistance or bleeding at insertion site.  Professional CGM inserted today in addition to time spent in DSMT.     37GMCH  Exp Date 10-9-2025  Indication for CGM study: Difficult to manage hyperglycemia    Care Plan and Education Provided:  Healthy Eating: Balanced meals, Plate planning method, and Portion control, Being Active: Finding a physical  activity routine that works for you and Relationship of activity to glucose, Monitoring: Frequency of monitoring, Individual glucose targets, Purpose, Proper technique, and Meter instruction: Patient was instructed on Accu-Chek Guide meter and was able to provide an accurate return demonstration. Patient's blood glucose reading today was 293 mg/dL., and Taking Medication: Educated on options such as GLP1-Edelmira or GIP/GLP1s, SGLT2s, or DPP4s.  Briefly discussed potential side effects and benefits of each.     Patient verbalized understanding of diabetes self-management education concepts discussed, opportunities for ongoing education and support, and recommendations provided today.    Plan  Check BG once daily at home.  Alternate testing fasting and 2 hours after biggest meal (supper). Goal before meals is  mg/dL and 2 hours after meals <180 mg/dL.   Wear G6 pro for 10 days and return to clinic at next visit.   Keep a food log while wearing the G6 pro  Consider a personal sensor to help with lifestyle changes. She will see what she thinks of wearing a pro first.   Recommend a GLP1 or GIP/GLP1-RA. Discussed them today in our visit. Will await results from the G6 pro.   Try to have more balance at lunch meal and reduce processed food intake.  Topics to cover at upcoming visits: Taking Medication, Problem Solving, Reducing Risks, and Healthy Coping    Follow-up:  Upcoming Diabetes Ed Appointments     Visit Type Date Time Department    DIABETES ED 4/14/2025  8:00 AM  DIABETES ED    PROFESSIONAL CGM 4/24/2025  8:00 AM  DIABETES ED        See Care Plan for co-developed, patient-state behavior change goals.    Education Materials Provided:  - [facility] Understanding Diabetes Booklet   - My Plate Planner   - Food Log    Subjective/Objective  Susi is an 59 year old year old, presenting for the following diabetes education related to: Presents for: Professional CGM Start  Accompanied by: Self  Diabetes  "education in the past 24mo: (Patient-Rptd) No  Focus of Visit: Healthy Eating  Diabetes type: (Patient-Rptd) Type 2  Date of diagnosis: about 2 years ago  Disease course: (Patient-Rptd) Stable  How confident are you filling out medical forms by yourself:: (Patient-Rptd) Extremely  Diabetes management related comments/concerns: (Patient-Rptd) How to get back to none  Difficulty affording diabetes testing supplies?: No  Other concerns:: Glasses  Cultural Influences/Ethnic Background:  Not  or     Diabetes Symptoms & Complications:  Diabetes Related Symptoms: (Patient-Rptd) None  Weight trend: (Patient-Rptd) Decreasing  Symptom course: (Patient-Rptd) Stable  Disease course: (Patient-Rptd) Stable  Complications assessed today?: No    Patient Problem List and Family Medical History reviewed for relevant medical history, current medical status, and diabetes risk factors.    Vitals:  LMP 09/29/2015 (Approximate)   Estimated body mass index is 34.75 kg/m  as calculated from the following:    Height as of 10/30/24: 1.575 m (5' 2\").    Weight as of 1/20/25: 86.2 kg (190 lb).   Last 3 BP:   BP Readings from Last 3 Encounters:   01/20/25 130/86   10/30/24 131/85   09/19/24 119/80       History   Smoking Status    Never   Smokeless Tobacco    Never       Labs:  Lab Results   Component Value Date    A1C 10.7 03/07/2025    A1C 6.6 03/05/2021     Lab Results   Component Value Date     03/07/2025     07/13/2021     11/24/2020     Lab Results   Component Value Date    LDL  09/19/2024      Comment:      Cannot estimate LDL when triglyceride exceeds 400 mg/dL    LDL 69 09/19/2024    LDL 68 11/24/2020     HDL Cholesterol   Date Value Ref Range Status   11/24/2020 30 (L) >49 mg/dL Final     Direct Measure HDL   Date Value Ref Range Status   09/19/2024 31 (L) >=50 mg/dL Final   ]  GFR Estimate   Date Value Ref Range Status   03/07/2025 >90 >60 mL/min/1.73m2 Final     Comment:     eGFR calculated using " "2021 CKD-EPI equation.   11/24/2020 82 >60 mL/min/[1.73_m2] Final     Comment:     Non  GFR Calc  Starting 12/18/2018, serum creatinine based estimated GFR (eGFR) will be   calculated using the Chronic Kidney Disease Epidemiology Collaboration   (CKD-EPI) equation.       GFR Estimate If Black   Date Value Ref Range Status   11/24/2020 >90 >60 mL/min/[1.73_m2] Final     Comment:      GFR Calc  Starting 12/18/2018, serum creatinine based estimated GFR (eGFR) will be   calculated using the Chronic Kidney Disease Epidemiology Collaboration   (CKD-EPI) equation.       Lab Results   Component Value Date    CR 0.71 03/07/2025    CR 0.81 11/24/2020     No results found for: \"MICROALBUMIN\"    Healthy Eating:  Healthy Eating Assessed Today: Yes  Cultural/Restorationist diet restrictions?: (Patient-Rptd) No  Meal planning/habits: Other  Please elaborate:: intermittant fasting  How many times a week on average do you eat food made away from home (restaurant/take-out)?: (Patient-Rptd) 3  Meals include: Dinner, Lunch  Breakfast: skips  Lunch: nuggets (works for  [employer]) - something small usually  Dinner: chicken breast or tacos or or salads or salmon or occasional brats/burger or pizza or pasta  Snacks: not usually  Beverages: (Patient-Rptd) Water    Being Active:  Being Active Assessed Today: Yes  Exercise:: Yes  Barrier to exercise: (Patient-Rptd) Time  On her feet 50/50 with work. Has a dog. Walking the dog more now that it is nice out.     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    Taking Medications:  Diabetes Medication(s)       Biguanides       metFORMIN (GLUCOPHAGE) 500 MG tablet TAKE 2 TABLETS BY MOUTH TWICE A DAY WITH MEALS          Taking Medication Assessed Today: Yes  Current Treatments: (Patient-Rptd) Oral Medication (taken by mouth)  Problems taking " diabetes medications regularly?: No  Diabetes medication side effects?: No    Healthy Coping:  Healthy Coping Assessed Today: Yes  Emotional response to diabetes: Ready to learn, Concern for health and well-being  Informal Support system:: (Patient-Rptd) [place] based, Family, Friends, Parent, Spouse  Stage of change: PREPARATION (Decided to change - considering how)    LUCILA Erazo [facility]  Time Spent: 63 minutes  Encounter Type: Individual    Any diabetes medication dose changes were [facility] the [facility] under the patient's referring provider.

## 2025-04-14 NOTE — LETTER
4/14/2025         RE: Susi Rossi  [address] [place] [address]        Dear Colleague,    Thank you for referring your patient, Susi Rossi, to the [facility] SPECIAL[facility] [place]. Please see a copy of my visit note below.      Diabetes Self-Management Education & Support    Presents for: Professional CGM Start    Type of Service: In Person Visit      Assessment  Susi is wondering why her A1C keeps going up. She would like to avoid more medications and work on lifestyle.  Currently, she is fasting for 15-17 hrs per day on average (intermittent fasting). Has been doing for the past 3 months most days.    She usually has a small lunch and then a bigger dinner. Bed is about 3 hours after dinner. She works for [employer] so has nuggets usually for lunch. Would benefit from reducing her processed food intake and including more whole foods with an emphasis on balance with carb, protein, veggies at meals. She tries to have balance at her dinner meal and eat healthier.     Would recommend another medication such as a GLP1 to be added.  Given her interest in lifestyle change, also discussed option of a personal CGM to better see impact of different foods/meals/exercise on her glucose and help facilitate change. She would like to see how the pro CGM goes first. Did discuss that insurance often does not cover personal CGMs if someone is not on insulin but that  we  can  do the Miguel with a discount code.  Emphasized diabetes pathophysiology and progression in our visit and why medications are usually added the longer someone has diabetes. She has never actually had diabetes education before.    Dexcom G6 pro-specific education provided on: Sensor insertion, intention of monitoring for 10 days. Keep records of BG, food intake, exercise, and medication dosing during wear.    Sensor was inserted with no resistance or bleeding at insertion site.  Professional CGM inserted today in  addition to time spent in DSMT.     37GM[facility]  Exp Date 10-9-2025  Indication for CGM study: Difficult to manage hyperglycemia    Care Plan and Education Provided:  Healthy Eating: Balanced meals, Plate planning method, and Portion control, Being Active: Finding a physical activity routine that works for you and Relationship of activity to glucose, Monitoring: Frequency of monitoring, Individual glucose targets, Purpose, Proper technique, and Meter instruction: Patient was instructed on Accu-Chek Guide meter and was able to provide an accurate return demonstration. Patient's blood glucose reading today was 293 mg/dL., and Taking Medication: Educated on options such as GLP1-Edelmira or GIP/GLP1s, SGLT2s, or DPP4s.  Briefly discussed potential side effects and benefits of each.     Patient verbalized understanding of diabetes self-management education concepts discussed, opportunities for ongoing education and support, and recommendations provided today.    Plan  Check BG once daily at home.  Alternate testing fasting and 2 hours after biggest meal (supper). Goal before meals is  mg/dL and 2 hours after meals <180 mg/dL.   Wear G6 pro for 10 days and return to clinic at next visit.   Keep a food log while wearing the G6 pro  Consider a personal sensor to help with lifestyle changes. She will see what she thinks of wearing a pro first.   Recommend a GLP1 or GIP/GLP1-RA. Discussed them today in our visit. Will await results from the G6 pro.   Try to have more balance at lunch meal and reduce processed food intake.  Topics to cover at upcoming visits: Taking Medication, Problem Solving, Reducing Risks, and Healthy Coping    Follow-up:  Upcoming Diabetes Ed Appointments     Visit Type Date Time Department    DIABETES ED 4/14/2025  8:00 AM  DIABETES ED    PROFESSIONAL CGM 4/24/2025  8:00 AM  DIABETES ED        See Care Plan for co-developed, patient-state behavior change goals.    Education Materials Provided:  Ashlye PAIZ  "[facility] [place] Understanding Diabetes Booklet   - My Plate Planner   - Food Log    Subjective/Objective  Susi is an 59 year old year old, presenting for the following diabetes education related to: Presents for: Professional CGM Start  Accompanied by: Self  Diabetes education in the past 24mo: (Patient-Rptd) No  Focus of Visit: Healthy Eating  Diabetes type: (Patient-Rptd) Type 2  Date of diagnosis: about 2 years ago  Disease course: (Patient-Rptd) Stable  How confident are you filling out medical forms by yourself:: (Patient-Rptd) Extremely  Diabetes management related comments/concerns: (Patient-Rptd) How to get back to none  Difficulty affording diabetes testing supplies?: No  Other concerns:: Glasses  Cultural Influences/Ethnic Background:  Not  or     Diabetes Symptoms & Complications:  Diabetes Related Symptoms: (Patient-Rptd) None  Weight trend: (Patient-Rptd) Decreasing  Symptom course: (Patient-Rptd) Stable  Disease course: (Patient-Rptd) Stable  Complications assessed today?: No    Patient Problem List and Family Medical History reviewed for relevant medical history, current medical status, and diabetes risk factors.    Vitals:  LMP 09/29/2015 (Approximate)   Estimated body mass index is 34.75 kg/m  as calculated from the following:    Height as of 10/30/24: 1.575 m (5' 2\").    Weight as of 1/20/25: 86.2 kg (190 lb).   Last 3 BP:   BP Readings from Last 3 Encounters:   01/20/25 130/86   10/30/24 131/85   09/19/24 119/80       History   Smoking Status     Never   Smokeless Tobacco     Never       Labs:  Lab Results   Component Value Date    A1C 10.7 03/07/2025    A1C 6.6 03/05/2021     Lab Results   Component Value Date     03/07/2025     07/13/2021     11/24/2020     Lab Results   Component Value Date    LDL  09/19/2024      Comment:      Cannot estimate LDL when triglyceride exceeds 400 mg/dL    LDL 69 09/19/2024    LDL 68 11/24/2020     HDL Cholesterol   Date Value " "Ref Range Status   11/24/2020 30 (L) >49 mg/dL Final     Direct Measure HDL   Date Value Ref Range Status   09/19/2024 31 (L) >=50 mg/dL Final   ]  GFR Estimate   Date Value Ref Range Status   03/07/2025 >90 >60 mL/min/1.73m2 Final     Comment:     eGFR calculated using 2021 CKD-EPI equation.   11/24/2020 82 >60 mL/min/[1.73_m2] Final     Comment:     Non  GFR Calc  Starting 12/18/2018, serum creatinine based estimated GFR (eGFR) will be   calculated using the Chronic Kidney Disease Epidemiology Collaboration   (CKD-EPI) equation.       GFR Estimate If Black   Date Value Ref Range Status   11/24/2020 >90 >60 mL/min/[1.73_m2] Final     Comment:      GFR Calc  Starting 12/18/2018, serum creatinine based estimated GFR (eGFR) will be   calculated using the Chronic Kidney Disease Epidemiology Collaboration   (CKD-EPI) equation.       Lab Results   Component Value Date    CR 0.71 03/07/2025    CR 0.81 11/24/2020     No results found for: \"MICROALBUMIN\"    Healthy Eating:  Healthy Eating Assessed Today: Yes  Cultural/Evangelical diet restrictions?: (Patient-Rptd) No  Meal planning/habits: Other  Please elaborate:: intermittant fasting  How many times a week on average do you eat food made away from home (restaurant/take-out)?: (Patient-Rptd) 3  Meals include: Dinner, Lunch  Breakfast: skips  Lunch: nuggets (works for  [employer]) - something small usually  Dinner: chicken breast or tacos or or salads or salmon or occasional brats/burger or pizza or pasta  Snacks: not usually  Beverages: (Patient-Rptd) Water    Being Active:  Being Active Assessed Today: Yes  Exercise:: Yes  Barrier to exercise: (Patient-Rptd) Time  On her feet 50/50 with work. Has a dog. Walking the dog more now that it is nice out.     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    Taking " Medications:  Diabetes Medication(s)       Biguanides       metFORMIN (GLUCOPHAGE) 500 MG tablet TAKE 2 TABLETS BY MOUTH TWICE A DAY WITH MEALS          Taking Medication Assessed Today: Yes  Current Treatments: (Patient-Rptd) Oral Medication (taken by mouth)  Problems taking diabetes medications regularly?: No  Diabetes medication side effects?: No    Healthy Coping:  Healthy Coping Assessed Today: Yes  Emotional response to diabetes: Ready to learn, Concern for health and well-being  Informal Support system:: (Patient-Rptd) Hayde based, Family, Friends, Parent, Spouse  Stage of change: PREPARATION (Decided to change - considering how)    LUCILA Erazo [facility]  Time Spent: 63 minutes  Encounter Type: Individual    Any diabetes medication dose changes were made via the [facility] Standing Orders under the patient's referring provider.       Render Note In Bullet Format When Appropriate: No Detail Level: Detailed Post-Care Instructions: I reviewed with the patient in detail post-care instructions. Patient is to wear sunprotection, and avoid picking at any of the treated lesions. Pt may apply Vaseline to crusted or scabbing areas.  If lesions located on lower leg or other difficult to treat area, luxamend or biafine was discussed to decrease healing time. Consent: The patient's consent was obtained including but not limited to risks of crusting, scabbing, blistering, scarring, darker or lighter pigmentary change, recurrence, incomplete removal and infection. Discussed that the treated areas could take up to 2-3 weeks to heal. Duration Of Freeze Thaw-Cycle (Seconds): 0

## 2025-04-14 NOTE — PATIENT INSTRUCTIONS
Dexcom G6 Pro Patient Instructions:    1. Plan to wear the Dexcom G6 Pro sensor for 10 days.  It is okay to shower, bathe, and swim.    2. Continue with your usual diabetes care plan - check blood sugars and take medicines, as prescribed.    3. Keep a log of the following things while wearing the sensor:  A. Food/drink: write down what you eat and drink while wearing the sensor, including the amounts  B. Medications: write down the times you take your medications and the dose or units you took  C. Exercise: write down any exercise you do while wearing the sensor.    4. Use a little extra care, especially when getting dressed or exercising, to avoid accidentally loosening or removing the sensor.  And try to avoid laying on the sensor at night.    5. Remove the sensor if you need to have an MRI or full body scans.   Do not throw the sensor away after you remove it.  We can still get the data from the time the sensor was worn.    6. Do not put any sunscreens or lotions on the sensor/transmitter.  Never disconnect the sensor from the transmitter- it will stop the study.    7. If the Dexcom 6 Pro sensor comes off early, place it in a plastic bag or envelope and bring it with you to your follow-up visit.     8. You may remove your sensor on 4/24 or wear to our visit as it will no longer be recording information after this time.  Just peel it off like a band-aid.  You can use adhesive remover to assist with removal as needed.  Make sure you put the sensor and transmitter in an envelope or bag and bring it with you to the follow-up visit to be downloaded.  We cannot review your information without it.     YOU MUST RETURN THE SENSOR TO BE DOWNLOADED WITHIN 30 DAYS (FROM THE DATE THE SENSOR WAS PLACED). DATA CANNOT BE OBTAINED FROM THE SYSTEM AFTER THIS TIME.      Follow-up appointment: 4/24.      [facility] for the [facility]:  For Your Diabetes Education and Nutrition  Appointments Call:  [phone]   For Diabetes Education or Nutrition Related Questions:   Phone: [phone]  Send Moku Message   If you need a medication refill please contact your pharmacy. Please allow 3 business days for your refills to be completed.

## 2025-04-24 ENCOUNTER — OFFICE VISIT (OUTPATIENT)
Dept: EDUCATION SERVICES | Facility: CLINIC | Age: 60
End: 2025-04-24
Payer: COMMERCIAL

## 2025-04-24 ENCOUNTER — TELEPHONE (OUTPATIENT)
Dept: ENDOCRINOLOGY | Facility: CLINIC | Age: 60
End: 2025-04-24

## 2025-04-24 DIAGNOSIS — E11.9 TYPE 2 DIABETES MELLITUS WITHOUT COMPLICATION, WITHOUT LONG-TERM CURRENT USE OF INSULIN (H): Primary | ICD-10-CM

## 2025-04-24 RX ORDER — HYDROCHLOROTHIAZIDE 12.5 MG/1
CAPSULE ORAL
Qty: 2 EACH | Refills: 11 | Status: SHIPPED | OUTPATIENT
Start: 2025-04-24

## 2025-04-24 NOTE — LETTER
4/24/2025         RE: Susi Rossi  [address] [place] [address]        Dear Colleague,    Thank you for referring your patient, Suis Rossi, to the [facility] [place]. Please see a copy of my visit note below.      Diabetes Self-Management Education & Support    Presents for: CGM Review    Type of Service: In Person Visit      Assessment  Follow up for G6 pro today. Susi has been checking her BG at home now 1x/day. Praised her on starting this. Home monitoring usually >200 mg/dL. Highest reading 398 mg/dL.     Spent bulk of visit reviewing her CGM data with her. She would like to keep working on weight loss and lifestyle changes and not need more medications. Explained that it would be unlikely that her glucose would improve enough with lifestyle changes alone currently.  She reports feeling like she has failed or is taking the easy way out with starting a GLP1.  Reviewed the progression of diabetes and her A1c results with her. Reviewed risk for complications as well and showed her how her A1c trends have been above target for over a year.  Reinforced what she is doing well and praised her on working on her lifestyle, weight loss, and home BG monitoring.  Recommend adding another diabetes therapy.  Educated on options for medications including GLP1s, SGLT2s, DPP4s, and Saundra. Explained how they each work and benefits/side effects of each. After some discussion, she is open to trying a GLP1. Checked with MTM liaison team for coverage.   Results:  GLP1 -Prior Authorization Required  DEXCOM G7 SENSORS -$147.60  FREESTYLE ZHENG 3 PLUS -$58.18  FARXIGA -$225.64    Recommended Ozempic or Mounjaro as preferred choices. She is open to either and is interested in Dr. Durham's preference. Spoke with him about this and he prefers Mounjaro for her.     We also discussed a personal CGM today. She is considering this. Explained we can order it and she can try it and if she does not  like it that is okay. She can just do BGM at home then and we can always do a professional CGM again in a couple months.     See CGM Reports in Monitoring section below.      Glucose Patterns & Trends:  Hyperglycemia, weekend- postmeal and weekday- postmeal and Time in range of  mg/dL, 3% of the time. Patient not meeting ADA Time in Range Targets.    Care Plan and Education Provided:  Healthy Eating: Balanced meals, Consistency in amount and timing of carbohydrate intake, Plate planning method, and Portion control  Being Active: Relationship of activity to glucose  Monitoring: Frequency of monitoring, Individual glucose targets, Log and interpret results, and reviewed CGM data with her indepthly  Taking Medication: GLP-1/GIP Instruction - Mounjaro and Trulicity administration technique taught today. Patient verbalized understanding. Side effects were discussed, if patient has any abdominal pain, with or without nausea and/or vomiting, stop medication, call provider, clinic or go to the emergency room.  Problem Solving: High glucose - causes, signs/symptoms, treatment and prevention  Reducing Risks: Complications of diabetes  Healthy Coping: Benefits of making appropriate lifestyle changes    Patient verbalized understanding of diabetes self-management education concepts discussed, opportunities for ongoing education and support, and recommendations provided today.    Plan  Spoke to Dr. Durham since Susi is on board to try Mounjaro or Ozempic.  He would prefer Mounjaro.   Mounjaro 2.5 mg weekly ordered.   Miguel 3+ ordered. Discount provided in note to pharmacy and in AVS for Susi if needed.  Continue to work on lifestyle changes and diet.   Continue Metformin.   Follow up with endocrinology in 1 month scheduled.   Topics to cover at upcoming visits: help with miguel 3+ set up if she decides to try this and link her to our [facility].    Follow-up:  Upcoming Diabetes Ed Appointments     DIABETES ED 5/8/2025   "8:00 AM  DIABETES ED    DIABETES ED 6/30/2025  8:00 AM  DIABETES ED        See Care Plan for co-developed, patient-state behavior change goals.    Education Materials Provided:  Copy of CGM print out      Subjective/Objective  Susi is an 59 year old, presenting for the following diabetes education related to: CGM Review  Cultural Influences/Ethnic Background:  Not  or     Diabetes Symptoms & Complications:  Diabetes Related Symptoms: (Patient-Rptd) None  Weight trend: (Patient-Rptd) Decreasing  Symptom course: (Patient-Rptd) Stable  Disease course: (Patient-Rptd) Stable  Complications assessed today?: No    Patient Problem List and Family Medical History reviewed for relevant medical history, current medical status, and diabetes risk factors.    Vitals:  LMP 09/29/2015 (Approximate)   Estimated body mass index is 34.75 kg/m  as calculated from the following:    Height as of 10/30/24: 1.575 m (5' 2\").    Weight as of 1/20/25: 86.2 kg (190 lb).   Last 3 BP:   BP Readings from Last 3 Encounters:   01/20/25 130/86   10/30/24 131/85   09/19/24 119/80       History   Smoking Status     Never   Smokeless Tobacco     Never       Labs:  Lab Results   Component Value Date    A1C 10.7 03/07/2025    A1C 6.6 03/05/2021     Lab Results   Component Value Date     03/07/2025     07/13/2021     11/24/2020     Lab Results   Component Value Date    LDL  09/19/2024      Comment:      Cannot estimate LDL when triglyceride exceeds 400 mg/dL    LDL 69 09/19/2024    LDL 68 11/24/2020     HDL Cholesterol   Date Value Ref Range Status   11/24/2020 30 (L) >49 mg/dL Final     Direct Measure HDL   Date Value Ref Range Status   09/19/2024 31 (L) >=50 mg/dL Final   ]  GFR Estimate   Date Value Ref Range Status   03/07/2025 >90 >60 mL/min/1.73m2 Final     Comment:     eGFR calculated using 2021 CKD-EPI equation.   11/24/2020 82 >60 mL/min/[1.73_m2] Final     Comment:     Non  GFR " Calc  Starting 12/18/2018, serum creatinine based estimated GFR (eGFR) will be   calculated using the Chronic Kidney Disease Epidemiology Collaboration   (CKD-EPI) equation.       GFR Estimate If Black   Date Value Ref Range Status   11/24/2020 >90 >60 mL/min/[1.73_m2] Final     Comment:      GFR Calc  Starting 12/18/2018, serum creatinine based estimated GFR (eGFR) will be   calculated using the Chronic Kidney Disease Epidemiology Collaboration   (CKD-EPI) equation.       Lab Results   Component Value Date    CR 0.71 03/07/2025    CR 0.81 11/24/2020     Lab Results   Component Value Date    MICROL 29.0 03/07/2025    UMALCR 49.24 (H) 03/07/2025    UCRR 58.9 03/07/2025 4/24/2025   Healthy Eating   Healthy Eating Assessed Today Yes   Cultural/Confucianist diet restrictions? No   Meal planning/habits Other   Please elaborate: intermittant fasting   Who cooks/prepares meals for you? Self   Who purchases food in  your home? Self   How many times a week on average do you eat food made away from home (restaurant/take-out)? 3   Meals include Breakfast;Dinner   Breakfast protein bar and banana or egg, sausage, Guyanese jaffe or egg mcmuffin and hash  browns   Lunch Filet o Fish with small borjas or chicken strips or protein bar and chicken nuggets or chicken salad   Dinner sushi or crab cake with bread and salad or chicken breast and rice or BLT or tuna with brussel sprouts and scallops or carrots, cauliflower, cheese, crackers   Beverages Water;Tea         4/24/2025   Monitoring   Monitoring Assessed Today Yes   Blood Glucose Meter Accu-chek   Times checking blood sugar at home (number) 1   Times checking blood sugar at home (per) Day     FBS: 251, 273, 260, 312 today  After dinner: 283, 187, 380, 371, 290, 398, 247, 251                    Diabetes Medication(s)       Biguanides       metFORMIN (GLUCOPHAGE) 500 MG tablet TAKE 2 TABLETS BY MOUTH TWICE A DAY WITH MEALS       Incretin Mimetic Agents        tirzepatide (MOUNJARO) 2.5 MG/0.5ML SOAJ auto-injector pen Inject 0.5 mLs (2.5 mg) subcutaneously once a week.              4/24/2025   Taking Medications   Taking Medication Assessed Today Yes   Current Treatments Oral Medication (taken by mouth)   Problems taking diabetes medications regularly? No   Diabetes medication side effects? No         4/24/2025   Problem Solving   Problem Solving Assessed Today No   Is the patient at risk for hypoglycemia? No           4/24/2025   Reducing Risks   Reducing Risks Assessed Today Yes   Diabetes Risks Sedentary Lifestyle;Hyperlipidemia;Age over 45 years   CAD Risks Diabetes Mellitus;Dyslipidemia;Hypertension;Obesity;Sedentary lifestyle   Has dilated eye exam at least once a year? No   Sees dentist every 6 months? Yes   Feet checked by healthcare provider in the last year? No         4/24/2025   Healthy Coping: Diabetes Distress Assessment   Healthy Coping Assessed Today Yes   I feel burned out by all of the attention and effort that diabetes demands of me. 1 - Not a Problem   It bothers me that diabetes seems to control my life. 1 - Not a Problem   I am frustrated that even when I do what I am supposed to for my diabetes, it doesn't seem to make a difference. 1 - Not a Problem   No matter how hard I try with my diabetes, it feels like it will never be good enough. 1 - Not a Problem   I am so tired of having to worry about diabetes all the time. 1 - Not a Problem   When it comes to my diabetes, I often feel like a failure. 1 - Not a Problem   It depresses me when I realize that my diabetes will likely never go away. 2 - A Little Problem   Living with diabetes is overwhelming for me. 1 - Not a Problem   T2 DDAS Total Score (0 - 1.9 Little or no DD, 2.0 - 2.9 Moderate DD,  3.0+ High DD) 1.1    Informal Support system: Hayde based;Family;Friends;Parent;Spouse       Patient-reported       Afia Cevallos, LUCILA LD CDCES  Time Spent: 65 minutes  Encounter Type: Individual    Any  diabetes medication dose changes were made via the [facility] Standing Orders under the patient's referring provider.

## 2025-04-24 NOTE — PATIENT INSTRUCTIONS
Check glucose once per day (either fasting in the morning or 2 hours after the biggest meal). Goal for fasting is  and 2 hours after meals it is <180 mg/dL.     Let's follow up 1 month after you see Dr. Durham (scheduled June 30)    You have been started on a new medication for your diabetes called Mounjaro.    In Type 2 Diabetes, you may not be making enough gut hormones that help manage appetite, stomach fullness, glucose levels after meals and weight.     You should NOT use Mounjaro if you have a history of pancreatitis or thyroid cancer in you or your family.      Mounjaro is a once a week non-insulin injection with a combination of 2 gut hormones.    With Mounjaro, there are different doses which are gradually increased to limit side effects. The starting dose is 2.5mg once a week.       Benefits: This medication will help you feel full with a smaller amount of food. It will lower your blood sugars after meals. The benefits are improved blood sugars, decrease in appetite and possible weight loss.     Side effects: Side effects from Mounjaro are more likely in the first 10-14 days of use and usually decrease after that. You may experience nausea if you eat too much with this medication, therefore you should eat LESS to avoid nausea.  It can be helpful to eat slower and eat smaller, more frequent meals rather than 3 big meals per day. Stop eating when you are no longer hungry. Eat bland foods like crackers, rice, toast.  Foods/drinks with hitesh can also help (sugar free gingerale or hitesh tea).  For severe nausea, you can ask your doctor for an anti-nausea prescription.  Heartburn or indigestion are more likely with this medication especially if you eat a larger amount of food or you lay down after eating. Avoiding fried or greasy foods helps as well. You may also notice a change in your bowel movements (diarrhea or constipation).  If you experience extreme abdominal pain that radiates into your back,  "STOP THIS MEDICATION IMMEDIATELY AND CALL YOUR PROVIDER.     With Mounjaro, if you are of child-bearing age and on oral contraception, use non-oral, barrier contraception for 4 weeks at the start AND at each dose change in Mounjaro.  Do not take Mounjaro if you are pregnant, planning to become pregnant or breastfeeding. Check with your healthcare provider about an alternative.     Visit the Mounjaro website for more information on the medication and for product vouchers/savings cards: [email or web address].[email or web address]    Instructions on how to use the Mounjaro pen:              Freestyle Miguel 3 or 3+    SENSOR BASICS:  Understand that your glucose sensor measures your glucose in your interstitial fluid and your blood sugar measures your glucose in your blood stream. The readings are not meant to be the same.        Your sensor glucose will lag behind your blood glucose.  \"Remember the roller coaster\".  Your blood sugar is always the front car and your sensor glucose is always the last car.  When blood sugar is moving up or down, the more difference there will be.          Take it easy while wearing the sensor.  Take extra care while bathing and getting dressed.  Wear loose fitting clothes on your sensor and try to avoid laying on your sensor.  You can shower/bathe with the sensor on.  But avoid submerging the sensor more than 3 feet for more than 30 minutes.  Gently pat to dry.    INITIAL SET UP:  Download the Miguel 3 saige if using your smartphone and create an account.  The saige will walk you through set up.  If you are using the Miguel 3 , turn it on and follow instructions for set up.      Miguel 3 saige:       There will be a 60 minute warm up for each new sensor.  Each sensor should last 14 days.  Do not take more than 500mg of vitamin C (Miguel 3+ more than 1000mg) per day or this can affect sensor accuracy.    The first 12 hours of every new sensor often a little \"off\" as it gets to know your body fluids.  Set " "glucose alarms for highs and lows per your preference or at the recommendations of your diabetes educator.  You will not be able to silence or turn off the urgent low alert at 54 mg/dL.  If an alarm goes off, go to your saige and acknowledge it or you will continue to get alarmed every 5 minutes.  Your Miguel 3 saige or  will notify you when it is time to change your sensor.  Just remove it like a band aid and throw it in the trash, then place a new sensor.  It is recommended to switch arms with every sensor.    DAILY ROUTINE:  Keep your phone or  within 33 feet of you to keep data communicating with your device.  If you are away from your device for more than 20 minutes, your device will alarm.  Be curious with what the sensor data shows.  How do your food choices impact your glucose?  Exercise?  Complete a fingerstick glucose check at these times:                          -when you feel differently than the sensor indicates                          -when you are not wearing a sensor                          -when you see this symbol:     DATA SHARING:  If you want to share your sensor data with a loved one (for phone users only), send them an invitation through the Miguel 3 saige.  They will use the [employer] saige and accept your invitation.      [employer] saige:   Our clinic will link to your data as well (phone users only). Under the menu (3 lines in the upper left corner), go to connected apps, then touch \"connect\" next to [employer] and then \"connect to a practice\".  Enter our practice ID \"[id number]\" and hit connect.  Patients using the reader can upload from home via desktop or laptop computer on [employer] or will have the  downloaded in clinic.     OTHER IMPORTANT NOTES:  If the sensor is often falling off before the 14 days are up, there are products than can help.  Talk to your diabetes educator.  You can leave your Miguel sensor ON for radiology scans (Xray, CT scan or MRI), however it " "is recommended that you use fingersticks for accurate readings during and 1 hour after an MRI as the accuracy of the sensor is questionable during this time.  If you need help with setting up your saige, starting a new sensor or other training, you can call [phone] or sign up at: [employer]  They can help you 1:1 by phone or virtual visit.  Contact the  if the sensor does not last the full 14 days for any reason, or if you have any other technical problems. Keep your Miguel sensor box with the lot and serial numbers as they often ask for this information.  -Miguel customer service phone number: [phone]     -Website for miguel replacement due to sensor failure or falling off: [email or web address]-en/support/[email or web address]-questions.[email or web address]  If your copay is more than $75 a month, call the copay assistance line at [phone] and they will work with your pharmacy to get your cost down.  Or, you can give the pharmacy the following information:      [id number], Group: [id number], Static ID: [id number], EXP: 12/31/25     Turn off automatic OS updates and do not update your phone's OS until you check your diabetes device 's website to verify that the medical apps you use are compatible with the new OS version. Turn off automatic OS updates by navigating to your system settings, usually accessible through a gear icon, and find the \"software update\" option; within this section, look for a toggle switch labeled \"automatic updates\" or similar, and disable it.    After updating your OS or adding a new accessory such as wireless headphones, confirm alert settings and then carefully monitor your medical device saige to make sure you can receive and hear alerts as expected.    At least once a month, check that your smartphone alerts are configured as expected. Ensure your volume, vibration, notifications, and other relevant settings still work.       "

## 2025-04-24 NOTE — TELEPHONE ENCOUNTER
Prior Authorization Approval    Medication: MOUNJARO 2.5 MG/0.5ML SC SOAJ  Authorization Effective Date: 3/25/2025  Authorization Expiration Date: 4/24/2026  Approved Dose/Quantity: uud  Reference #: Key: [id number]   Insurance Company: [employer] Non-Specialty PA's - Phone [phone] Fax [phone]  Expected CoPay: $  $300.00    Key: [id number]

## 2025-04-24 NOTE — PROGRESS NOTES
Diabetes Self-Management Education & Support    Presents for: CGM Review    Type of Service: In Person Visit      Assessment  Follow up for G6 pro today. Susi has been checking her BG at home now 1x/day. Praised her on starting this. Home monitoring usually >200 mg/dL. Highest reading 398 mg/dL.     Spent bulk of visit reviewing her CGM data with her. She would like to keep working on weight loss and lifestyle changes and not need more medications. Explained that it would be unlikely that her glucose would improve enough with lifestyle changes alone currently.  She reports feeling like she has failed or is taking the easy way out with starting a GLP1.  Reviewed the progression of diabetes and her A1c results with her. Reviewed risk for complications as well and showed her how her A1c trends have been above target for over a year.  Reinforced what she is doing well and praised her on working on her lifestyle, weight loss, and home BG monitoring.  Recommend adding another diabetes therapy.  Educated on options for medications including GLP1s, SGLT2s, DPP4s, and Saundra. Explained how they each work and benefits/side effects of each. After some discussion, she is open to trying a GLP1. Checked with MTM liaison team for coverage.   Results:  GLP1 -Prior Authorization Required  DEXCOM G7 SENSORS -$147.60  FREESTYLE ZHENG 3 PLUS -$58.18  FARXIGA -$225.64    Recommended Ozempic or Mounjaro as preferred choices. She is open to either and is interested in Dr. Durham's preference. Spoke with him about this and he prefers Mounjaro for her.     We also discussed a personal CGM today. She is considering this. Explained we can order it and she can try it and if she does not like it that is okay. She can just do BGM at home then and we can always do a professional CGM again in a couple months.     See CGM Reports in Monitoring section below.      Glucose Patterns & Trends:  Hyperglycemia, weekend- postmeal and weekday- postmeal  and Time in range of  mg/dL, 3% of the time. Patient not meeting ADA Time in Range Targets.    Care Plan and Education Provided:  Healthy Eating: Balanced meals, Consistency in amount and timing of carbohydrate intake, Plate planning method, and Portion control  Being Active: Relationship of activity to glucose  Monitoring: Frequency of monitoring, Individual glucose targets, Log and interpret results, and reviewed CGM data with her indepthly  Taking Medication: GLP-1/GIP Instruction - Mounjaro and Trulicity administration technique taught today. Patient verbalized understanding. Side effects were discussed, if patient has any abdominal pain, with or without nausea and/or vomiting, stop medication, call provider, clinic or go to the emergency room.  Problem Solving: High glucose - causes, signs/symptoms, treatment and prevention  Reducing Risks: Complications of diabetes  Healthy Coping: Benefits of making appropriate lifestyle changes    Patient verbalized understanding of diabetes self-management education concepts discussed, opportunities for ongoing education and support, and recommendations provided today.    Plan  Spoke to Dr. Durham since Susi is on board to try Mounjaro or Ozempic.  He would prefer Mounjaro.   Mounjaro 2.5 mg weekly ordered.   Miguel 3+ ordered. Discount provided in note to pharmacy and in AVS for Susi if needed.  Continue to work on lifestyle changes and diet.   Continue Metformin.   Follow up with endocrinology in 1 month scheduled.   Topics to cover at upcoming visits: help with miguel 3+ set up if she decides to try this and link her to our [facility].    Follow-up:  Upcoming Diabetes Ed Appointments     DIABETES ED 5/8/2025  8:00 AM  DIABETES ED    DIABETES ED 6/30/2025  8:00 AM  DIABETES ED        See Care Plan for co-developed, patient-state behavior change goals.    Education Materials Provided:  Copy of CGM print out      Subjective/Objective  Susi is an 59 year old,  "presenting for the following diabetes education related to: CGM Review  Cultural Influences/Ethnic Background:  Not  or     Diabetes Symptoms & Complications:  Diabetes Related Symptoms: (Patient-Rptd) None  Weight trend: (Patient-Rptd) Decreasing  Symptom course: (Patient-Rptd) Stable  Disease course: (Patient-Rptd) Stable  Complications assessed today?: No    Patient Problem List and Family Medical History reviewed for relevant medical history, current medical status, and diabetes risk factors.    Vitals:  LMP 09/29/2015 (Approximate)   Estimated body mass index is 34.75 kg/m  as calculated from the following:    Height as of 10/30/24: 1.575 m (5' 2\").    Weight as of 1/20/25: 86.2 kg (190 lb).   Last 3 BP:   BP Readings from Last 3 Encounters:   01/20/25 130/86   10/30/24 131/85   09/19/24 119/80       History   Smoking Status    Never   Smokeless Tobacco    Never       Labs:  Lab Results   Component Value Date    A1C 10.7 03/07/2025    A1C 6.6 03/05/2021     Lab Results   Component Value Date     03/07/2025     07/13/2021     11/24/2020     Lab Results   Component Value Date    LDL  09/19/2024      Comment:      Cannot estimate LDL when triglyceride exceeds 400 mg/dL    LDL 69 09/19/2024    LDL 68 11/24/2020     HDL Cholesterol   Date Value Ref Range Status   11/24/2020 30 (L) >49 mg/dL Final     Direct Measure HDL   Date Value Ref Range Status   09/19/2024 31 (L) >=50 mg/dL Final   ]  GFR Estimate   Date Value Ref Range Status   03/07/2025 >90 >60 mL/min/1.73m2 Final     Comment:     eGFR calculated using 2021 CKD-EPI equation.   11/24/2020 82 >60 mL/min/[1.73_m2] Final     Comment:     Non  GFR Calc  Starting 12/18/2018, serum creatinine based estimated GFR (eGFR) will be   calculated using the Chronic Kidney Disease Epidemiology Collaboration   (CKD-EPI) equation.       GFR Estimate If Black   Date Value Ref Range Status   11/24/2020 >90 >60 mL/min/[1.73_m2] " Final     Comment:      GFR Calc  Starting 12/18/2018, serum creatinine based estimated GFR (eGFR) will be   calculated using the Chronic Kidney Disease Epidemiology Collaboration   (CKD-EPI) equation.       Lab Results   Component Value Date    CR 0.71 03/07/2025    CR 0.81 11/24/2020     Lab Results   Component Value Date    MICROL 29.0 03/07/2025    UMALCR 49.24 (H) 03/07/2025    UCRR 58.9 03/07/2025 4/24/2025   Healthy Eating   Healthy Eating Assessed Today Yes   Cultural/Druze diet restrictions? No   Meal planning/habits Other   Please elaborate: intermittant fasting   Who cooks/prepares meals for you? Self   Who purchases food in  your home? Self   How many times a week on average do you eat food made away from home (restaurant/take-out)? 3   Meals include Breakfast;Dinner   Breakfast protein bar and banana or egg, sausage, Tajik jaffe or egg mcmuffin and hash  browns   Lunch Filet o Fish with small borjas or chicken strips or protein bar and chicken nuggets or chicken salad   Dinner sushi or crab cake with bread and salad or chicken breast and rice or BLT or tuna with brussel sprouts and scallops or carrots, cauliflower, cheese, crackers   Beverages Water;Tea         4/24/2025   Monitoring   Monitoring Assessed Today Yes   Blood Glucose Meter Accu-chek   Times checking blood sugar at home (number) 1   Times checking blood sugar at home (per) Day     FBS: 251, 273, 260, 312 today  After dinner: 283, 187, 380, 371, 290, 398, 247, 251                    Diabetes Medication(s)       Biguanides       metFORMIN (GLUCOPHAGE) 500 MG tablet TAKE 2 TABLETS BY MOUTH TWICE A DAY WITH MEALS       Incretin Mimetic Agents       tirzepatide (MOUNJARO) 2.5 MG/0.5ML SOAJ auto-injector pen Inject 0.5 mLs (2.5 mg) subcutaneously once a week.              4/24/2025   Taking Medications   Taking Medication Assessed Today Yes   Current Treatments Oral Medication (taken by mouth)   Problems taking  diabetes medications regularly? No   Diabetes medication side effects? No         4/24/2025   Problem Solving   Problem Solving Assessed Today No   Is the patient at risk for hypoglycemia? No           4/24/2025   Reducing Risks   Reducing Risks Assessed Today Yes   Diabetes Risks Sedentary Lifestyle;Hyperlipidemia;Age over 45 years   CAD Risks Diabetes Mellitus;Dyslipidemia;Hypertension;Obesity;Sedentary lifestyle   Has dilated eye exam at least once a year? No   Sees dentist every 6 months? Yes   Feet checked by healthcare provider in the last year? No         4/24/2025   Healthy Coping: Diabetes Distress Assessment   Healthy Coping Assessed Today Yes   I feel burned out by all of the attention and effort that diabetes demands of me. 1 - Not a Problem   It bothers me that diabetes seems to control my life. 1 - Not a Problem   I am frustrated that even when I do what I am supposed to for my diabetes, it doesn't seem to make a difference. 1 - Not a Problem   No matter how hard I try with my diabetes, it feels like it will never be good enough. 1 - Not a Problem   I am so tired of having to worry about diabetes all the time. 1 - Not a Problem   When it comes to my diabetes, I often feel like a failure. 1 - Not a Problem   It depresses me when I realize that my diabetes will likely never go away. 2 - A Little Problem   Living with diabetes is overwhelming for me. 1 - Not a Problem   T2 DDAS Total Score (0 - 1.9 Little or no DD, 2.0 - 2.9 Moderate DD,  3.0+ High DD) 1.1    Informal Support system: Hay[place] based;Family;Friends;Parent;Spouse       Patient-reported       Afia Cevallos, LUCILA LD [facility]  Time Spent: 65 minutes  Encounter Type: Individual    Any diabetes medication dose changes were made via the [facility] Standing Orders under the patient's referring provider.

## 2025-05-07 ENCOUNTER — TELEPHONE (OUTPATIENT)
Dept: EDUCATION SERVICES | Facility: CLINIC | Age: 60
End: 2025-05-07
Payer: COMMERCIAL

## 2025-05-07 ENCOUNTER — MYC MEDICAL ADVICE (OUTPATIENT)
Dept: EDUCATION SERVICES | Facility: CLINIC | Age: 60
End: 2025-05-07

## 2025-05-07 NOTE — TELEPHONE ENCOUNTER
Susi sent a mychart asking for a call to connect quickly.     Called her back this morning. She is hesitant to start the Mounjaro. Reviewed that diabetes is a disease and in order to help bring her numbers down and reduce risk for diabetes complications we strongly recommend another medication.  Reiterated that she is working very hard and diabetes is challenging to deal with every day since it is a chronic disease. Explained that waiting until after her colonoscopy which is in 5 days is just fine.  She has a follow up with endocrinology later this month so reminded her of that visit and then we have a follow up 1 month after that. Explained that she can always reach out sooner if needed and can either start the Miguel on her own if comfortable or we can do together at our next visit.      Afia Cevallos, LUCILAN, LD, [facility]

## 2025-05-22 ENCOUNTER — OFFICE VISIT (OUTPATIENT)
Dept: ENDOCRINOLOGY | Facility: CLINIC | Age: 60
End: 2025-05-22
Payer: COMMERCIAL

## 2025-05-22 VITALS
DIASTOLIC BLOOD PRESSURE: 80 MMHG | SYSTOLIC BLOOD PRESSURE: 118 MMHG | BODY MASS INDEX: 34.57 KG/M2 | WEIGHT: 189 LBS | HEART RATE: 91 BPM

## 2025-05-22 DIAGNOSIS — E11.65 UNCONTROLLED TYPE 2 DIABETES MELLITUS WITH HYPERGLYCEMIA (H): Primary | ICD-10-CM

## 2025-05-22 DIAGNOSIS — E06.3 HYPOTHYROIDISM DUE TO HASHIMOTO'S THYROIDITIS: ICD-10-CM

## 2025-05-22 DIAGNOSIS — E11.29 TYPE 2 DIABETES MELLITUS WITH DIABETIC MICROALBUMINURIA, WITHOUT LONG-TERM CURRENT USE OF INSULIN (H): ICD-10-CM

## 2025-05-22 DIAGNOSIS — R80.9 TYPE 2 DIABETES MELLITUS WITH DIABETIC MICROALBUMINURIA, WITHOUT LONG-TERM CURRENT USE OF INSULIN (H): ICD-10-CM

## 2025-05-22 NOTE — PROGRESS NOTES
Recent issues:  Hypothyroidism and diabetes follow-up evaluation  Met with [facility] ([facility]) in 4/2025, plan to start Mounjaro and the Libre3 Plus CGM... but not started yet  Patient reports feeling well overall, plans to have rescheduled colonoscopy soon?        Thyroid:  ~2006. Initial diagnosis of hypothyroidism   Had OBGYN for infertility evaluation with Dr. Kovacs/[facility]   Started levothyroxine medication  Typically taking levothyroxine 0.15 mg most often  No known history of thyroid nodules  Famx thyroid disease: Sister- hypothyroidism    Had seen Dr. DANITZA Todd/ [facility]  Subsequently saw Dr. Korina Vera/[facility]  Dose reductions with levothyroxine medication.  7/2021. Dose reduction levothyroxine to 0.125 mg daily  Has felt less anxious and tired on that dose  Previous  thyroid tests include:   Lab Test 07/13/21  0934 03/05/21  1148 11/24/20  0913 09/14/18  0000 04/02/18  1030   TSH 0.08* <0.01* 0.04* 3.590 2.05   T4 1.56* 1.57* 1.68*  --   --            10/6/21. Initial endocrinology evaluation with me at [facility]  Reviewed health history, thyroid and diabetes endocrine issues  Lab testing, then dose increase of levothyroxine medication  Recent  labs include:  Lab Results   Component Value Date    TSH 1.07 03/07/2025    T4 1.66 01/25/2024     (H) 12/10/2021     Current dose:  Levothyroxine 0.137 mg daily      Diabetes:  History of pre-diabetes  Took metformin during her infertility management with OBGYN, took for 1.5-2 yrs ~2006 Fall 2020. Diagnosis of diabetes mellitus  ?? 7/2021. Restarted metformin med use  11/2020. Labs showed elevated hgbA1c 6.8% indicating T2DM  Metformin dose changed as 500 mg 2-tabs BID, tolerates well   4/2025. Plan to start Mounjaro medication...  Previous  hgbA1c trends include:   Lab Test 07/13/21  0934 03/05/21  1148 11/24/20  0913 03/29/19  0943 09/14/18  0000   A1C 6.5* 6.6* 6.8* 6.0* 6.0*   FamHx DM:  none  Current DM medications:    Metformin 500  mg 2-tabs in morning and evening    Blood glucose (BG) meter:  none  Previous [facility] hgbA1c trends include:  Lab Results   Component Value Date    A1C 10.7 (H) 2025    A1C 9.7 (H) 2024    A1C 8.2 (H) 2024    A1C 8.1 (H) 2023    A1C 6.6 (H) 12/10/2021        Recent [facility] labs include:  Lab Results   Component Value Date    A1C 10.7 (H) 2025     2025    POTASSIUM 4.6 2025    CHLORIDE 104 2025    CO2 26 2025    ANIONGAP 10 2025     (H) 2025    BUN 14.3 2025    CR 0.71 2025    GFRESTIMATED >90 2025    GFRESTBLACK >90 2020    MICKY 9.6 2025    CHOL 165 2024    TRIG 450 (H) 2024    HDL 31 (L) 2024    LDL  2024      Comment:      Cannot estimate LDL when triglyceride exceeds 400 mg/dL    LDL 69 2024    NHDL 134 (H) 2024    UCRR 58.9 2025    MICROL 29.0 2025    UMALCR 49.24 (H) 2025     DM Complications:    Nephropathy:    microalbuminuria      Lives in [place], [place], 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 Korina Vera/[facility] for general medicine evaluations.  Had seen Dr. Kovacs/[facility]      PMH/PSH:  Past Medical History:   Diagnosis Date    Acquired hypothyroidism 2006    Benign essential hypertension     on med since 2016 or so    Hyperlipidemia     on rx since about     Type 2 diabetes mellitus (H)     Microalbuminuria     Past Surgical History:   Procedure Laterality Date    LAPAROSCOPIC CHOLECYSTECTOMY  2014    Procedure: LAPAROSCOPIC CHOLECYSTECTOMY;  Surgeon: Chay Shafer MD;  Location:  OR       Family Hx:  Family History   Problem Relation Age of Onset    Hyperlipidemia Father     Family History Negative Mother          MVA     Breast Cancer Maternal Grandmother          Social Hx:  Social History     Socioeconomic History    Marital status:      Spouse name: Not on file    Number of  children: 0    Years of education: Not on file    Highest education level: Not on file   Occupational History    Occupation: supervisor at [employer]   Tobacco Use    Smoking status: Never    Smokeless tobacco: Never   Vaping Use    Vaping status: Never Used   Substance and Sexual Activity    Alcohol use: Yes     Alcohol/week: 0.0 standard drinks of alcohol     Comment: rare    Drug use: No    Sexual activity: Yes     Partners: Male   Other Topics Concern    Parent/sibling w/ CABG, MI or angioplasty before 65F 55M? Not Asked   Social History Narrative    Not on file     Social Drivers of Health     Financial Resource Strain: Low Risk  (10/30/2024)    Financial Resource Strain     Within the past 12 months, have you or your family members you live with been unable to get utilities (heat, electricity) when it was really needed?: No   Food Insecurity: Low Risk  (10/30/2024)    Food Insecurity     Within the past 12 months, did you worry that your food would run out before you got money to buy more?: No     Within the past 12 months, did the food you bought just not last and you didn t have money to get more?: No   Transportation Needs: Low Risk  (10/30/2024)    Transportation Needs     Within the past 12 months, has lack of transportation kept you from medical appointments, getting your medicines, non-medical meetings or appointments, work, or from getting things that you need?: No   Physical Activity: Sufficiently Active (10/30/2024)    Exercise Vital Sign     Days of Exercise per Week: 4 days     Minutes of Exercise per Session: 60 min   Stress: No Stress Concern Present (10/30/2024)    [employer] - Occupational Stress Questionnaire     Feeling of Stress : Not at all   Social Connections: Unknown (10/30/2024)    Social Connection and Isolation Panel [NHANES]     Frequency of Communication with Friends and Family: Not on file     Frequency of Social Gatherings with Friends and Family: More  than three times a week     Attends Tenriism Services: Not on file     Active Member of Clubs or Organizations: Not on file     Attends Club or Organization Meetings: Not on file     Marital Status: Not on file   Interpersonal Safety: Low Risk  (10/30/2024)    Interpersonal Safety     Do you feel physically and emotionally safe where you currently live?: Yes     Within the past 12 months, have you been hit, slapped, kicked or otherwise physically hurt by someone?: No     Within the past 12 months, have you been humiliated or emotionally abused in other ways by your partner or ex-partner?: No   Housing Stability: Low Risk  (10/30/2024)    Housing Stability     Do you have housing? : Yes     Are you worried about losing your housing?: No          MEDICATIONS:  has a current medication list which includes the following prescription(s): aspirin, cholecalciferol, cyanocobalamin, fexofenadine, fish oil-omega-3 fatty acids, fluticasone, lactobacillus rhamnosus (gg), levothyroxine, magnesium, metformin, multivitamin w/minerals, rosuvastatin, triamterene-hctz, turmeric, bisacodyl, blood glucose, blood glucose monitoring, blood pressure monitoring, freestyle roque 3 plus sensor, polyethylene glycol, thin, and tirzepatide.    ROS:     ROS: 10 point ROS neg other than the symptoms noted above in the HPI.    GENERAL: some fatigue, mild wt loss; denies fevers, chills, night sweats.   HEENT: no dysphagia, odonophagia, diplopia, neck pain  THYROID:  no apparent hyper or hypothyroid symptoms  CV: no chest pain, pressure, palpitations  LUNGS: no SOB, BAKER, cough, wheezing   ABDOMEN: some BM urgency; no nausea, diarrhea, constipation, abdominal pain  EXTREMITIES: no rashes, ulcers, edema  NEUROLOGY: no headaches, denies changes in vision, tingling, extremitiy numbness   MSK: arthralgias at hands, knees; denies muscle weakness  SKIN: no rashes or lesions  :  no menses since age 54, no hot flashes  PSYCH:  stable mood, no significant  anxiety or depression  ENDOCRINE: no heat or cold intolerance      Physical Exam   VS: /80   Pulse 91   Wt 85.7 kg (189 lb)   LMP 09/29/2015 (Approximate)   BMI 34.57 kg/m    GENERAL: AXOX3, NAD, well dressed, answering questions appropriately, appears stated age.  ENT: no nose swelling or nasal discharge, mouth redness or gum changes.  EYES: eyes grossly normal to inspection, conjunctivae and sclerae normal, no exophthalmos or proptosis  THYROID:  no apparent nodules or goiter  LUNGS: no audible wheeze, cough or visible cyanosis, or increased work of breathing  ABDOMEN: abdomen obese size  EXTREMITIES: feet not examined today, no pedal edema noted  NEUROLOGY: CN grossly intact, no tremors  MSK: grossly intact  SKIN:  no apparent skin lesions, rash, or edema with visualized skin appearance  PSYCH: mentation appears normal, affect normal/bright, judgement and insight intact,   normal speech and appearance well groomed    LABS:    All pertinent notes, labs, and images personally reviewed by me.     A/P:  Encounter Diagnoses   Name Primary?    Uncontrolled type 2 diabetes mellitus with hyperglycemia (H) Yes    Type 2 diabetes mellitus with diabetic microalbuminuria, without long-term current use of insulin (H)     Hypothyroidism due to Hashimoto's thyroiditis          Comments:  Reviewed health history, thyroid and diabetes issues.  We reviewed her rising hgbA1c trends, importance for more aggressive T2DM med management  Reviewed and interpreted tests that I previously ordered.   Ordered appropriate tests for the endocrinology disease management.    Management options discussed and implemented after shared medical decision making with the patient.  Diabetes problem is chronic with exacerbation progression, hyperglycemia     Plan:  Discussed general issues with the diabetes diagnosis and management  We discussed the hgbA1c test which reflects previous overall glucose levels or control  Discussed the importance  of blood glucose (BG) testing to assess glucose trends  Provided general overview of the diabetes medication options and medication treatment plan.    Reviewed general issues with the hypothyroidism diagnosis and management  Discussed lab tests used to assess patient thyroid hormone levels  Reviewed use of thyroid medication for hypothyroidism treatment    Recommend:   Continue current metformin twice daily dose plan  Reschedule the colonoscopy procedure ASAP  After completing colonoscopy, start Mounjaro 2.5 mg subcutaneous weekly   Message our office 1-2 weeks after starting Mounjaro  Keep focus on diet, exercise, weight loss management.  Start the Libre3 Plus CGM ASAP   She felt comfortable inserting new sensor, using smartphone saige  Keep scheduled 6/2025 [facility] followup appt  Advise having fasting lipid panel testing and dilated eye examination, at least annually    Continue the current levothyroxine 0.137 mg daily dose plan, take daily on empty stomach  No thyroid U/S imaging needed at this time    Check with OBGYN or PCP physician regarding f/u DEXA imaging plan  Addressed patient questions today     The longitudinal plan of care for the endocrine problem(s) were addressed during this visit.  Due to added complexity of care,   we will continue to support the patient and the subsequent management of this condition with ongoing continuity of care.    There are no Patient Instructions on file for this visit.    Future labs ordered today: No orders of the defined types were placed in this encounter.    Radiology/Consults ordered today: None    Total time spent on day of encounter:  20 min    Follow-up:  7/31/25 at 11:30 am, Return    JEROME Durham MD, MS  Endocrinology  [facility]    CC:  Care Team

## 2025-06-12 ENCOUNTER — MYC MEDICAL ADVICE (OUTPATIENT)
Dept: GASTROENTEROLOGY | Facility: CLINIC | Age: 60
End: 2025-06-12
Payer: COMMERCIAL

## 2025-06-12 ENCOUNTER — TELEPHONE (OUTPATIENT)
Dept: GASTROENTEROLOGY | Facility: CLINIC | Age: 60
End: 2025-06-12
Payer: COMMERCIAL

## 2025-06-12 NOTE — TELEPHONE ENCOUNTER
Attempted to contact patient in order to complete pre assessment questions.     No answer. Left message to return call to [phone] option 3.    Callback communication sent via [employer].    Ev Leal RN

## 2025-06-12 NOTE — TELEPHONE ENCOUNTER
Rescheduled Colonoscopy  Due to scheduling conflict and [facility]  Pre-assessment previously completed on 4-25-25     Pre visit planning completed.      Procedure details:    Patient scheduled for Colonoscopy on 6-30-25.     Arrival time: 1015. Procedure time 1100    Facility location: [facility]; [address] [place] [address]. Check in location: [facility].     Sedation type: Conscious sedation     Pre op exam needed? No.    Indication for procedure: screening       Chart review:     Electronic implanted devices? No    Recent diagnosis of diverticulitis within the last 6 weeks? No      Medication review:    Diabetic? Yes. Oral diabetic medications: Metformin (glucophage): HOLD day of procedure.  Diabetic injectables: Mounjaro (Tirzepatide).  Weekly dosing of medication.  HOLD 7 days before procedure.  Follow up with managing provider.     Anticoagulants? No    Weight loss medication/injectable? Verify pt has not started Mounjaro yet.  Previous PA stated pt was waiting until after procedure, that was in April 2025.    Other medication HOLDING recommendations:  N/A      Prep for procedure:     Bowel prep recommendation: Standard Golytely. Bowel prep sent to [id number] - [place], [place] - [address]. (Standard Golytely Previously sent, if pt has started Mounjaro, pt will need extended Golytely.)  Due to: diabetes    Prep instructions sent via [employer] in 4-3-25    Did not resend prep today, verify above and re-send instructions if needed.     Lyly Reddy RN  Endoscopy Procedure Pre Assessment   [phone] option 3

## 2025-06-16 NOTE — TELEPHONE ENCOUNTER
Pre assessment completed for upcoming procedure.   (Please see previous telephone encounter notes for complete details)    Procedure details:    Procedure date 6-30-25, arrival time 1015 and facility location reviewed.    Pre op exam needed? No.    Designated  policy reviewed. Instructed to have someone stay 6  hours post procedure.       Medication review:    Medications reviewed. Please see supporting documentation below. Holding recommendations discussed (if applicable).   Oral diabetic medication(s): Metformin (glucophage): HOLD day of procedure.  Weight loss medication/injectable: Mounjaro (Tirzepatide).  Weekly dosing of medication.  HOLD 7 days before procedure.  Follow up with managing provider.  (Pt has not started yet, and will wait until after procedure.)      Prep for procedure:     Procedure prep instructions reviewed.  Patient aware of bowel prep instructions and prep Rx and will review them in [facility]. Writer answered all patient questions (if applicable). NPO instructions and dietary changes reviewed.    Pt does not need new Rx.     Patient was instructed to call if any questions or concerns arise.        Any additional information needed:  N/A      Patient verbalized understanding and had no questions or concerns at this time.      Lyly Reddy RN  Endoscopy Procedure Pre Assessment   [phone] option 3

## 2025-06-21 ENCOUNTER — HEALTH MAINTENANCE LETTER (OUTPATIENT)
Age: 60
End: 2025-06-21

## 2025-06-30 ENCOUNTER — HOSPITAL ENCOUNTER (OUTPATIENT)
Facility: CLINIC | Age: 60
Discharge: HOME | End: 2025-06-30
Attending: COLON & RECTAL SURGERY | Admitting: COLON & RECTAL SURGERY
Payer: COMMERCIAL

## 2025-06-30 VITALS
RESPIRATION RATE: 18 BRPM | WEIGHT: 170 LBS | BODY MASS INDEX: 31.28 KG/M2 | DIASTOLIC BLOOD PRESSURE: 79 MMHG | HEIGHT: 62 IN | OXYGEN SATURATION: 97 % | HEART RATE: 57 BPM | SYSTOLIC BLOOD PRESSURE: 111 MMHG

## 2025-06-30 LAB — COLONOSCOPY: NORMAL

## 2025-06-30 PROCEDURE — 250N000013 HC RX MED GY IP 250 OP 250 PS 637: Performed by: COLON & RECTAL SURGERY

## 2025-06-30 PROCEDURE — 88305 TISSUE EXAM BY PATHOLOGIST: CPT | Mod: TC | Performed by: COLON & RECTAL SURGERY

## 2025-06-30 PROCEDURE — 99153 MOD SED SAME PHYS/QHP EA: CPT | Mod: PT | Performed by: COLON & RECTAL SURGERY

## 2025-06-30 PROCEDURE — 250N000011 HC RX IP 250 OP 636: Performed by: COLON & RECTAL SURGERY

## 2025-06-30 PROCEDURE — 88305 TISSUE EXAM BY PATHOLOGIST: CPT | Mod: 26 | Performed by: PATHOLOGY

## 2025-06-30 PROCEDURE — 45385 COLONOSCOPY W/LESION REMOVAL: CPT | Performed by: COLON & RECTAL SURGERY

## 2025-06-30 PROCEDURE — G0500 MOD SEDAT ENDO SERVICE >5YRS: HCPCS | Performed by: COLON & RECTAL SURGERY

## 2025-06-30 RX ORDER — FENTANYL CITRATE 50 UG/ML
INJECTION, SOLUTION INTRAMUSCULAR; INTRAVENOUS PRN
Status: DISCONTINUED | OUTPATIENT
Start: 2025-06-30 | End: 2025-06-30 | Stop reason: HOSPADM

## 2025-06-30 RX ORDER — SIMETHICONE 40MG/0.6ML
SUSPENSION, DROPS(FINAL DOSAGE FORM)(ML) ORAL PRN
Status: DISCONTINUED | OUTPATIENT
Start: 2025-06-30 | End: 2025-06-30 | Stop reason: HOSPADM

## 2025-06-30 RX ORDER — LIDOCAINE 40 MG/G
CREAM TOPICAL
Status: DISCONTINUED | OUTPATIENT
Start: 2025-06-30 | End: 2025-06-30 | Stop reason: HOSPADM

## 2025-06-30 RX ORDER — ONDANSETRON 2 MG/ML
4 INJECTION INTRAMUSCULAR; INTRAVENOUS
Status: DISCONTINUED | OUTPATIENT
Start: 2025-06-30 | End: 2025-06-30 | Stop reason: HOSPADM

## 2025-06-30 ASSESSMENT — ACTIVITIES OF DAILY LIVING (ADL)
ADLS_ACUITY_SCORE: 41
ADLS_ACUITY_SCORE: 41

## 2025-06-30 NOTE — H&P
Pre-Endoscopy History and Physical   Susi Rossi MRN# [id number]   YOB: 1965 Age: 59 year old   Date of Procedure: 6/30/2025   Primary care provider: Ashleigh Naranjo   Type of Endoscopy: colonoscopy   Reason for Procedure: personal history of polyps   Type of Anesthesia Anticipated: Moderate Sedation   HPI:   Susi is a 59 year old female with a personal history of polyps.  She last had a colonoscopy where 3 adenomas were removed.  She denies BRBPR, abdominal pain, nausea/vomiting, changes in bowel habits or unintentional weight loss.  She denies a FH of CRC.    Allergies   Allergen Reactions    Penicillins Other (See Comments)     Hives, shortness of breath        Prior to Admission Medications   Prescriptions Last Dose Informant Patient Reported? Taking?   ASPIRIN EC PO More than a month  Yes Yes   Sig: Take 81 mg by mouth daily   Blood Pressure Monitoring KIT   No No   Sig: Use as directed   Cholecalciferol (VITAMIN D3 PO) 6/29/2025  Yes Yes   Sig: Take 5,000 Units by mouth daily. K2   Continuous Glucose Sensor (FREESTYLE ZHENG 3 PLUS SENSOR) MISC   No No   Sig: Change every 15 days.   Patient not taking: Reported on 5/22/2025   Cyanocobalamin (VITAMIN B-12 PO) 6/29/2025  Yes Yes   Sig: Take by mouth.   Turmeric (QC TUMERIC COMPLEX PO) More than a month  Yes Yes   Sig: Take by mouth. gummy   bisacodyl (DULCOLAX) 5 MG EC tablet   No No   Sig: Take 2 tablets at 3 pm the day before your procedure. If your procedure is before 11 am, take 2 additional tablets at 11 pm. If your procedure is after 11 am, take 2 additional tablets at 6 am. For additional instructions refer to your colonoscopy prep instructions.   blood glucose (NO BRAND SPECIFIED) test strip   No No   Sig: Use to test blood sugar daily or as directed. To accompany: Blood Glucose Monitor Brands: per insurance.   blood glucose monitoring (NO BRAND SPECIFIED) meter device kit   No No   Sig: Use to test blood sugar daily or as directed.  Preferred blood glucose meter OR supplies to accompany: Blood Glucose Monitor Brands: per insurance.   fexofenadine (ALLEGRA) 180 MG tablet Past Week  Yes Yes   Sig: Take 180 mg by mouth daily   fish oil-omega-3 fatty acids 1000 MG capsule 6/29/2025  Yes Yes   Sig: Take 2 g by mouth daily   fluticasone (FLONASE) 50 MCG/ACT spray   No No   Sig: Spray 1-2 sprays into both nostrils daily   lactobacillus rhamnosus, GG, (CULTURELL) capsule   Yes No   Sig: Take 1 capsule by mouth 2 times daily probiotics   levothyroxine (SYNTHROID/LEVOTHROID) 137 MCG tablet 6/30/2025 Morning  No Yes   Sig: TAKE 1 TABLET BY MOUTH EVERY DAY   magnesium 250 MG tablet Past Week  Yes Yes   Sig: Take 1 tablet by mouth daily   metFORMIN (GLUCOPHAGE) 500 MG tablet 6/29/2025  No Yes   Sig: TAKE 2 TABLETS BY MOUTH TWICE A DAY WITH MEALS   multivitamin, therapeutic with minerals (THERA-VIT-M) TABS More than a month  Yes Yes   Sig: Take 1 tablet by mouth daily. Has been using Emergen C vitamins   polyethylene glycol (GOLYTELY) 236 g suspension   No No   Sig: The night before the exam at 6 pm drink an 8-ounce glass every 15 minutes until the jug is half empty. If you arrive before 11 AM: Drink the other half of the Golytely jug at 11 PM night before procedure. If you arrive after 11 AM: Drink the other half of the Golytely jug at 6 AM day of procedure. For additional instructions refer to your colonoscopy prep instructions.   rosuvastatin (CRESTOR) 20 MG tablet Past Week  No Yes   Sig: Take 1 tablet (20 mg) by mouth at bedtime.   thin (NO BRAND SPECIFIED) lancets   No No   Sig: Use with lanceting device. To accompany: Blood Glucose Monitor Brands: per insurance.   tirzepatide (MOUNJARO) 2.5 MG/0.5ML SOAJ auto-injector pen   No No   Sig: Inject 0.5 mLs (2.5 mg) subcutaneously once a week.   Patient not taking: Reported on 5/22/2025   triamterene-HCTZ (DYAZIDE) 37.5-25 MG capsule 6/28/2025  No No   Sig: TAKE 1 CAPSULE BY MOUTH EVERY DAY     "  Facility-Administered Medications: None      Patient Active Problem List   Diagnosis    Hypothyroidism, unspecified type    Essential hypertension    Hyperlipidemia, unspecified hyperlipidemia type    OSEAS (obstructive sleep apnea)    Obesity (BMI 35.0-39.9) with comorbidity (H)    Diabetes mellitus, type 2 (H)    Low HDL (under 40)    Fatty liver    Type 2 diabetes mellitus with hyperglycemia, without long-term current use of insulin (H)    Annual physical exam    Screen for colon cancer    Cervical cancer screening      Past Medical History:   Diagnosis Date    Acquired hypothyroidism     Benign essential hypertension     on med since  or so    Hyperlipidemia     on rx since about     Sleep apnea     wears cpap    Type 2 diabetes mellitus (H)     Microalbuminuria      Past Surgical History:   Procedure Laterality Date    LAPAROSCOPIC CHOLECYSTECTOMY  2014    Procedure: LAPAROSCOPIC CHOLECYSTECTOMY;  Surgeon: Chay Shafer MD;  Location:  OR      Social History     Tobacco Use    Smoking status: Never    Smokeless tobacco: Never   Substance Use Topics    Alcohol use: Yes     Alcohol/week: 0.0 standard drinks of alcohol     Comment: rare      Family History   Problem Relation Age of Onset    Hyperlipidemia Father     Family History Negative Mother          MVA     Breast Cancer Maternal Grandmother       PHYSICAL EXAM:   /86   Pulse 70   Resp 15   Ht 1.575 m (5' 2\")   Wt 77.1 kg (170 lb)   LMP 2015 (Approximate)   SpO2 94%   BMI 31.09 kg/m   Estimated body mass index is 31.09 kg/m  as calculated from the following:    Height as of this encounter: 1.575 m (5' 2\").    Weight as of this encounter: 77.1 kg (170 lb).   RESP: lungs clear to auscultation - no rales, rhonchi or wheezes   CV: regular rates and rhythm   ASA Class 2 - Mild systemic disease    Assessment: 60 y/o woman with a personal history of polyps    Plan: Colonoscopy with moderate sedation.  Risks of the " procedure were discussed including, but not limited to, bleeding, perforation and missed lesions.  Patient understands and is willing to proceed.    Roberto Carlos Bland MD ....................  6/30/2025   10:43 AM  Colon and Rectal Surgery Staff  [phone]

## 2025-07-01 ENCOUNTER — RESULTS FOLLOW-UP (OUTPATIENT)
Dept: SURGERY | Facility: CLINIC | Age: 60
End: 2025-07-01

## 2025-07-01 PROBLEM — D12.6 TUBULAR ADENOMA OF COLON: Status: ACTIVE | Noted: 2025-07-01

## 2025-07-01 LAB
PATH REPORT.COMMENTS IMP SPEC: NORMAL
PATH REPORT.COMMENTS IMP SPEC: NORMAL
PATH REPORT.FINAL DX SPEC: NORMAL
PATH REPORT.GROSS SPEC: NORMAL
PATH REPORT.MICROSCOPIC SPEC OTHER STN: NORMAL
PATH REPORT.RELEVANT HX SPEC: NORMAL
PHOTO IMAGE: NORMAL

## 2025-07-02 ENCOUNTER — PATIENT OUTREACH (OUTPATIENT)
Dept: GASTROENTEROLOGY | Facility: CLINIC | Age: 60
End: 2025-07-02
Payer: COMMERCIAL

## 2025-07-03 DIAGNOSIS — E06.3 HYPOTHYROIDISM DUE TO HASHIMOTO'S THYROIDITIS: ICD-10-CM

## 2025-07-03 RX ORDER — LEVOTHYROXINE SODIUM 137 UG/1
137 TABLET ORAL DAILY
Qty: 30 TABLET | Refills: 5 | Status: SHIPPED | OUTPATIENT
Start: 2025-07-03

## 2025-07-03 NOTE — TELEPHONE ENCOUNTER
Last Written Prescription Date:  1/20/25  Last Fill Quantity: 30,  # refills: 5   Last office visit: 5/22/2025 ; last virtual visit: Visit date not found with prescribing provider:  Dr. Durham   Future Office Visit: 7/31/25    Requested Prescriptions   Pending Prescriptions Disp Refills    levothyroxine (SYNTHROID/LEVOTHROID) 137 MCG tablet [Pharmacy Med Name: LEVOTHYROXINE 137 MCG TABLET] 30 tablet 5     Sig: TAKE 1 TABLET BY MOUTH EVERY DAY       Thyroid Protocol Passed - 7/3/2025 10:32 AM        Passed - Patient is 12 years or older        Passed - Medication is active on med list and the sig matches. RN to manually verify dose and sig if red X/fail.     If the protocol passes (green check), you do not need to verify med dose and sig.    A prescription matches if they are the same clinical intention.    For Example: once daily and every morning are the same.    The protocol can not identify upper and lower case letters as matching and will fail.     For Example: Take 1 tablet (50 mg) by mouth daily     TAKE 1 TABLET (50 MG) BY MOUTH DAILY    For all fails (red x), verify dose and sig.    If the refill does match what is on file, the RN can still proceed to approve the refill request.       If they do not match, route to the appropriate provider.             Passed - Recent (12 month) or future (90 days) visit with authorizing provider's specialty (provided they have been seen in the past 15 months)     The patient must have completed an in-person or virtual visit within the past 12 months or has a future visit scheduled within the next 90 days with the authorizing provider s specialty.  Urgent care and e-visits do not qualify as an office visit for this protocol.          Passed - Medication indicated for associated diagnosis     Medication is associated with one or more of the following diagnoses:  Hypothyroidism  Thyroid stimulating hormone suppression therapy  Thyroid cancer  Acquired atrophy of thyroid           Passed - Normal TSH on file in past 12 months     Recent Labs   Lab Test 03/07/25  0922   TSH 1.07              Passed - No active pregnancy on record     If patient is pregnant or has had a positive pregnancy test, please check TSH.          Passed - No positive pregnancy test in past 12 months     If patient is pregnant or has had a positive pregnancy test, please check TSH.             Refills sent  Ceci Lopez RN

## 2025-07-14 ENCOUNTER — ALLIED HEALTH/NURSE VISIT (OUTPATIENT)
Dept: EDUCATION SERVICES | Facility: CLINIC | Age: 60
End: 2025-07-14
Payer: COMMERCIAL

## 2025-07-14 VITALS — WEIGHT: 187 LBS | BODY MASS INDEX: 34.2 KG/M2

## 2025-07-14 DIAGNOSIS — E11.9 TYPE 2 DIABETES MELLITUS WITHOUT COMPLICATION, WITHOUT LONG-TERM CURRENT USE OF INSULIN (H): Primary | ICD-10-CM

## 2025-07-14 PROCEDURE — G0108 DIAB MANAGE TRN  PER INDIV: HCPCS | Performed by: DIETITIAN, REGISTERED

## 2025-07-14 NOTE — LETTER
7/14/2025         RE: Susi Rossi  [address] [place] [address]        Dear Colleague,    Thank you for referring your patient, Susi Rossi, to the [facility] [place]. Please see a copy of my visit note below.      Diabetes Self-Management Education & Support    Presents for: Follow-up    Type of Service: In Person Visit      Assessment  Susi reports she has not started the Mounjaro yet. She did have her colonoscopy and the plan was to start Mounjaro after this.  Reports she is going to start seeing a  1-2 x/week.  They will help with nutrition as well. She really wants to try this first and see how her next A1c is.  Explained that we can see the impact of adding in more exercise and her  with just a couple weeks of CGM data.     Walked her through the download and set up of the [employer] saige. Linked her account to our [employer] diabetes account. She is now ready to put a sensor on.  Explained that I would recommend she place a sensor this week so endocrinology can review her data at their next visit. In general, her BG monitoring average has reduced in the past few weeks but is still above target.  Reminded her of the glucose targets today. Showed her how to view her TIR on her roque saige each week so she can see how changes are impacting her TIR (big picture) week to week.     She has been intermitant fasting 5 days per week (eating window noon to 6 pm). Has stopped eating buns and fries.  Will have some fruit, bread, chicken strips.  Trying to eat more salads. Eats mostly [employer] food when at work. Encouraged bringing food from home and helped brainstorm some ideas.  Showed her some sites for recipes to help possibly too. She is tracking her intakes in My Fitness Pal so reviewed those. She is aiming for about 1400 kcal/d.        Patient's most recent   Lab Results   Component Value Date    A1C 10.7 03/07/2025    A1C 6.6 03/05/2021     is not meeting goal  of <7.0    Diabetes knowledge and skills assessment:   Patient is knowledgeable in diabetes management concepts related to: Being Active and taking medication    Based on learning assessment above, most appropriate setting for further diabetes education would be: Group class or Individual setting.    Care Plan and Education Provided:  Healthy Eating: Balanced meals, Heart healthy diet, Portion control, and Weight Management  Being Active: Relationship of activity to glucose and continuing to increase her exercise/walking.  Monitoring: Individual glucose targets and CGM instruction: Patient was instructed on Freestyle Miguel System: Freestyle Miguel sensor: insertion technique, sensor site location and rotation, insulin administration in relation to sensor placement, sensor wear, Vitamin C & Aspirin effects on sensor, Use of trends and graphs for pattern management and problem solving, and [employer] software  Taking Medication: Recommended starting the Mounjaro but she would like to try a  first.     Patient verbalized understanding of diabetes self-management education concepts discussed, opportunities for ongoing education and support, and recommendations provided today.    Plan  Start miguel 3+ CGM on thurs/fri this week to have 2 weeks of data before endocrinology visit on 7/31.  Recommended she start the Mounjaro but she plans to wait until she sees Dr. Durham in a few weeks and see how exercising more helps her glucose.   Try to bring food from home to work for first meal.   Continue to try to eat more vegetables and fruit.   Choose whole grains vs refined grains more often.   Reduce intake of fried food.  Continue to work on exercise/walking and getting started with .   Follow up scheduled 8/28  Topics to cover at upcoming visits: review glucose and help titrate Mounjaro    See Care Plan for co-developed, patient-state behavior change goals.    Education Materials Provided:  No new  "materials provided today      Subjective/Objective  Susi is an 59 year old, presenting for the following diabetes education related to: Follow-up  Accompanied by: Self  Diabetes education in the past 24mo: Yes  Focus of Visit: Healthy Eating, Monitoring, Being Active  Diabetes type: Type 2  Date of diagnosis: about 2 years ago  Disease course: Stable  How confident are you filling out medical forms by yourself:: Extremely  Diabetes management related comments/concerns: Talking to a  this week and wants to try this route.  Cultural Influences/Ethnic Background:  Not  or     Diabetes Symptoms & Complications:  Diabetes Related Symptoms: None  Weight trend: Decreasing  Symptom course: Stable  Disease course: Stable  Complications assessed today?: No    Patient Problem List and Family Medical History reviewed for relevant medical history, current medical status, and diabetes risk factors.    Vitals:  Wt 84.8 kg (187 lb)   LMP 09/29/2015 (Approximate)   BMI 34.20 kg/m    Estimated body mass index is 34.2 kg/m  as calculated from the following:    Height as of 6/30/25: 1.575 m (5' 2\").    Weight as of this encounter: 84.8 kg (187 lb).   Last 3 BP:   BP Readings from Last 3 Encounters:   06/30/25 111/79   05/22/25 118/80   01/20/25 130/86       History   Smoking Status     Never   Smokeless Tobacco     Never       Labs:  Lab Results   Component Value Date    A1C 10.7 03/07/2025    A1C 6.6 03/05/2021     Lab Results   Component Value Date     03/07/2025     07/13/2021     11/24/2020     Lab Results   Component Value Date    LDL  09/19/2024      Comment:      Cannot estimate LDL when triglyceride exceeds 400 mg/dL    LDL 69 09/19/2024    LDL 68 11/24/2020     HDL Cholesterol   Date Value Ref Range Status   11/24/2020 30 (L) >49 mg/dL Final     Direct Measure HDL   Date Value Ref Range Status   09/19/2024 31 (L) >=50 mg/dL Final     GFR Estimate   Date Value Ref Range " Status   03/07/2025 >90 >60 mL/min/1.73m2 Final     Comment:     eGFR calculated using 2021 CKD-EPI equation.   11/24/2020 82 >60 mL/min/[1.73_m2] Final     Comment:     Non  GFR Calc  Starting 12/18/2018, serum creatinine based estimated GFR (eGFR) will be   calculated using the Chronic Kidney Disease Epidemiology Collaboration   (CKD-EPI) equation.       GFR Estimate If Black   Date Value Ref Range Status   11/24/2020 >90 >60 mL/min/[1.73_m2] Final     Comment:      GFR Calc  Starting 12/18/2018, serum creatinine based estimated GFR (eGFR) will be   calculated using the Chronic Kidney Disease Epidemiology Collaboration   (CKD-EPI) equation.       Lab Results   Component Value Date    CR 0.71 03/07/2025    CR 0.81 11/24/2020     Lab Results   Component Value Date    MICROL 29.0 03/07/2025    UMALCR 49.24 (H) 03/07/2025    UCRR 58.9 03/07/2025 7/14/2025   Healthy Eating   Healthy Eating Assessed Today Yes   Cultural/Jewish diet restrictions? No   Meal planning/habits Other   Please elaborate: intermittant fasting   Who cooks/prepares meals for you? Self   Who purchases food in  your home? Self   How many times a week on average do you eat food made away from home (restaurant/take-out)? 3   Meals include Breakfast;Dinner   Breakfast protein bar and banana or egg, sausage, Palestinian jaffe or egg mcmuffin and hash  browns (eats this around noon or 1 pm)   Dinner sushi or crab cake with bread and salad or chicken breast and rice or BLT or tuna with brussel sprouts and scallops or carrots, cauliflower, cheese, crackers or Filet o Fish with small borjas or chicken strips or protein bar and chicken nuggets or chicken salad (has dinner between 5-7 pm)   Snacks not usually   Beverages Water;Tea         7/14/2025   Being Active   Being Active Assessed Today Yes   Exercise: Yes   How intense was your typical exercise?  Moderate (like brisk walking)   Barrier to exercise Time          7/14/2025   Monitoring   Monitoring Assessed Today Yes   Blood Glucose Meter Accu-chek   Times checking blood sugar at home (number) 1   Times checking blood sugar at home (per) Day   Blood glucose trend Decreasing     FBS: 245, 243, 256, 234, 254, 246, 297, 259, 212, 203, 177, 189, 234, 189, 162, 164  [address]  [address] 229    Diabetes Medication(s)       Biguanides       metFORMIN (GLUCOPHAGE) 500 MG tablet TAKE 2 TABLETS BY MOUTH TWICE A DAY WITH MEALS       Incretin Mimetic Agents       tirzepatide (MOUNJARO) 2.5 MG/0.5ML SOAJ auto-injector pen Inject 0.5 mLs (2.5 mg) subcutaneously once a week.     Patient not taking: Reported on 7/14/2025 7/14/2025   Taking Medications   Taking Medication Assessed Today Yes   Current Treatments Oral Medication (taken by mouth)   Problems taking diabetes medications regularly? No   Diabetes medication side effects? No         7/14/2025   Problem Solving   Problem Solving Assessed Today No   Is the patient at risk for hypoglycemia? No       Afia Cevallos, KYE, TIFFANIE, [facility]  Time Spent: 60 minutes  Encounter Type: Individual    Any diabetes medication dose changes were made via the [facility] Standing Orders under the patient's referring provider.

## 2025-07-14 NOTE — PROGRESS NOTES
Diabetes Self-Management Education & Support    Presents for: Follow-up    Type of Service: In Person Visit      Assessment  Susi reports she has not started the Mounjaro yet. She did have her colonoscopy and the plan was to start Mounjaro after this.  Reports she is going to start seeing a  1-2 x/week.  They will help with nutrition as well. She really wants to try this first and see how her next A1c is.  Explained that we can see the impact of adding in more exercise and her  with just a couple weeks of CGM data.     Walked her through the download and set up of the [employer] saige. Linked her account to our [facility] diabetes account. She is now ready to put a sensor on.  Explained that I would recommend she place a sensor this week so endocrinology can review her data at their next visit. In general, her BG monitoring average has reduced in the past few weeks but is still above target.  Reminded her of the glucose targets today. Showed her how to view her TIR on her roque saige each week so she can see how changes are impacting her TIR (big picture) week to week.     She has been intermitant fasting 5 days per week (eating window noon to 6 pm). Has stopped eating buns and fries.  Will have some fruit, bread, chicken strips.  Trying to eat more salads. Eats mostly [employer] food when at work. Encouraged bringing food from home and helped brainstorm some ideas.  Showed her some sites for recipes to help possibly too. She is tracking her intakes in My Fitness Pal so reviewed those. She is aiming for about 1400 kcal/d.        Patient's most recent   Lab Results   Component Value Date    A1C 10.7 03/07/2025    A1C 6.6 03/05/2021     is not meeting goal of <7.0    Diabetes knowledge and skills assessment:   Patient is knowledgeable in diabetes management concepts related to: Being Active and taking medication    Based on learning assessment above, most appropriate setting for further diabetes education  would be: Group class or Individual setting.    Care Plan and Education Provided:  Healthy Eating: Balanced meals, Heart healthy diet, Portion control, and Weight Management  Being Active: Relationship of activity to glucose and continuing to increase her exercise/walking.  Monitoring: Individual glucose targets and CGM instruction: Patient was instructed on Freestyle Miguel System: Freestyle Miguel sensor: insertion technique, sensor site location and rotation, insulin administration in relation to sensor placement, sensor wear, Vitamin C & Aspirin effects on sensor, Use of trends and graphs for pattern management and problem solving, and [employer] software  Taking Medication: Recommended starting the Mounjaro but she would like to try a  first.     Patient verbalized understanding of diabetes self-management education concepts discussed, opportunities for ongoing education and support, and recommendations provided today.    Plan  Start miguel 3+ CGM on thurs/fri this week to have 2 weeks of data before endocrinology visit on 7/31.  Recommended she start the Mounjaro but she plans to wait until she sees Dr. Durham in a few weeks and see how exercising more helps her glucose.   Try to bring food from home to work for first meal.   Continue to try to eat more vegetables and fruit.   Choose whole grains vs refined grains more often.   Reduce intake of fried food.  Continue to work on exercise/walking and getting started with .   Follow up scheduled 8/28  Topics to cover at upcoming visits: review glucose and help titrate Mounjaro    See Care Plan for co-developed, patient-state behavior change goals.    Education Materials Provided:  No new materials provided today      Subjective/Objective  Susi is an 59 year old, presenting for the following diabetes education related to: Follow-up  Accompanied by: Self  Diabetes education in the past 24mo: Yes  Focus of Visit: Healthy Eating, Monitoring, Being  "Active  Diabetes type: Type 2  Date of diagnosis: about 2 years ago  Disease course: Stable  How confident are you filling out medical forms by yourself:: Extremely  Diabetes management related comments/concerns: Talking to a  this week and wants to try this route.  Cultural Influences/Ethnic Background:  Not  or     Diabetes Symptoms & Complications:  Diabetes Related Symptoms: None  Weight trend: Decreasing  Symptom course: Stable  Disease course: Stable  Complications assessed today?: No    Patient Problem List and Family Medical History reviewed for relevant medical history, current medical status, and diabetes risk factors.    Vitals:  Wt 84.8 kg (187 lb)   LMP 09/29/2015 (Approximate)   BMI 34.20 kg/m    Estimated body mass index is 34.2 kg/m  as calculated from the following:    Height as of 6/30/25: 1.575 m (5' 2\").    Weight as of this encounter: 84.8 kg (187 lb).   Last 3 BP:   BP Readings from Last 3 Encounters:   06/30/25 111/79   05/22/25 118/80   01/20/25 130/86       History   Smoking Status    Never   Smokeless Tobacco    Never       Labs:  Lab Results   Component Value Date    A1C 10.7 03/07/2025    A1C 6.6 03/05/2021     Lab Results   Component Value Date     03/07/2025     07/13/2021     11/24/2020     Lab Results   Component Value Date    LDL  09/19/2024      Comment:      Cannot estimate LDL when triglyceride exceeds 400 mg/dL    LDL 69 09/19/2024    LDL 68 11/24/2020     HDL Cholesterol   Date Value Ref Range Status   11/24/2020 30 (L) >49 mg/dL Final     Direct Measure HDL   Date Value Ref Range Status   09/19/2024 31 (L) >=50 mg/dL Final     GFR Estimate   Date Value Ref Range Status   03/07/2025 >90 >60 mL/min/1.73m2 Final     Comment:     eGFR calculated using 2021 CKD-EPI equation.   11/24/2020 82 >60 mL/min/[1.73_m2] Final     Comment:     Non  GFR Calc  Starting 12/18/2018, serum creatinine based estimated GFR (eGFR) " will be   calculated using the Chronic Kidney Disease Epidemiology Collaboration   (CKD-EPI) equation.       GFR Estimate If Black   Date Value Ref Range Status   11/24/2020 >90 >60 mL/min/[1.73_m2] Final     Comment:      GFR Calc  Starting 12/18/2018, serum creatinine based estimated GFR (eGFR) will be   calculated using the Chronic Kidney Disease Epidemiology Collaboration   (CKD-EPI) equation.       Lab Results   Component Value Date    CR 0.71 03/07/2025    CR 0.81 11/24/2020     Lab Results   Component Value Date    MICROL 29.0 03/07/2025    UMALCR 49.24 (H) 03/07/2025    UCRR 58.9 03/07/2025 7/14/2025   Healthy Eating   Healthy Eating Assessed Today Yes   Cultural/Episcopalian diet restrictions? No   Meal planning/habits Other   Please elaborate: intermittant fasting   Who cooks/prepares meals for you? Self   Who purchases food in  your home? Self   How many times a week on average do you eat food made away from home (restaurant/take-out)? 3   Meals include Breakfast;Dinner   Breakfast protein bar and banana or egg, sausage, Solomon Islander jaffe or egg mcmuffin and hash  browns (eats this around noon or 1 pm)   Dinner sushi or crab cake with bread and salad or chicken breast and rice or BLT or tuna with brussel sprouts and scallops or carrots, cauliflower, cheese, crackers or Filet o Fish with small borjas or chicken strips or protein bar and chicken nuggets or chicken salad (has dinner between 5-7 pm)   Snacks not usually   Beverages Water;Tea         7/14/2025   Being Active   Being Active Assessed Today Yes   Exercise: Yes   How intense was your typical exercise?  Moderate (like brisk walking)   Barrier to exercise Time         7/14/2025   Monitoring   Monitoring Assessed Today Yes   Blood Glucose Meter Accu-chek   Times checking blood sugar at home (number) 1   Times checking blood sugar at home (per) Day   Blood glucose trend Decreasing     FBS: 245, 243, 256, 234, 254, 246, 297, 259, 212,  203, 177, 189, 234, 189, 162, 164  [address]  [address] 229    Diabetes Medication(s)       Biguanides       metFORMIN (GLUCOPHAGE) 500 MG tablet TAKE 2 TABLETS BY MOUTH TWICE A DAY WITH MEALS       Incretin Mimetic Agents       tirzepatide (MOUNJARO) 2.5 MG/0.5ML SOAJ auto-injector pen Inject 0.5 mLs (2.5 mg) subcutaneously once a week.     Patient not taking: Reported on 7/14/2025 7/14/2025   Taking Medications   Taking Medication Assessed Today Yes   Current Treatments Oral Medication (taken by mouth)   Problems taking diabetes medications regularly? No   Diabetes medication side effects? No         7/14/2025   Problem Solving   Problem Solving Assessed Today No   Is the patient at risk for hypoglycemia? No       Afia Cevallos, KYE, TIFFANIE, [facility]  Time Spent: 60 minutes  Encounter Type: Individual    Any diabetes medication dose changes were made via the [facility] Standing Orders under the patient's referring provider.

## 2025-07-14 NOTE — PATIENT INSTRUCTIONS
"Recommend to place your miguel sensor on Thursday or Friday this week so Dr. Reilly has data at his visit.     Freestyle Miguel 3 or 3+    SENSOR BASICS:  Understand that your glucose sensor measures your glucose in your interstitial fluid and your blood sugar measures your glucose in your blood stream. The readings are not meant to be the same.        Your sensor glucose will lag behind your blood glucose.  \"Remember the roller coaster\".  Your blood sugar is always the front car and your sensor glucose is always the last car.  When blood sugar is moving up or down, the more difference there will be.          Take it easy while wearing the sensor.  Take extra care while bathing and getting dressed.  Wear loose fitting clothes on your sensor and try to avoid laying on your sensor.  You can shower/bathe with the sensor on.  But avoid submerging the sensor more than 3 feet for more than 30 minutes.  Gently pat to dry.    INITIAL SET UP:  Download the Miguel by [employer] saige if using your smartphone and create an account.  The saige will walk you through set up.  If you are using the [employer] 3 , turn it on and follow instructions for set up.                    Miguel by Acevedo:   There will be a 60 minute warm up for each new sensor.  Each sensor should last 14 days.  Do not take more than 500mg of vitamin C (Miguel 3+ more than 1000mg) per day or this can affect sensor accuracy.    The first 12 hours of every new sensor often a little \"off\" as it gets to know your body fluids.  Set glucose alarms for highs and lows per your preference or at the recommendations of your diabetes educator.  You will not be able to silence or turn off the urgent low alert at 54 mg/dL.  If an alarm goes off, go to your saige and acknowledge it or you will continue to get alarmed every 5 minutes.  Your Miguel 3 saige or  will notify you when it is time to change your sensor.  Just remove it like a band aid and throw it in the trash, then " "place a new sensor.  It is recommended to switch arms with every sensor.    DAILY ROUTINE:  Keep your phone or  within 33 feet of you to keep data communicating with your device.  If you are away from your device for more than 20 minutes, your device will alarm.  Be curious with what the sensor data shows.  How do your food choices impact your glucose?  Exercise?  Complete a fingerstick glucose check at these times:                          -when you feel differently than the sensor indicates                          -when you are not wearing a sensor                          -when you see this symbol:     DATA SHARING:  If you want to share your sensor data with a loved one (for phone users only), send them an invitation through the Miguel 3 saige.  They will use the [employer] saige and accept your invitation.      [employer] saige:   Our clinic will link to your data as well (phone users only). Under the menu (3 lines in the upper left corner), go to connected apps, then touch \"connect\" next to Pollsb, and then \"connect to a practice\".  Enter our practice ID \"[id number]\" and hit connect.  Patients using the reader can upload from home via desktop or laptop computer on [employer] or will have the  downloaded in clinic.     OTHER IMPORTANT NOTES:  If the sensor is often falling off before the 14 days are up, there are products than can help.  Talk to your diabetes educator.  You can leave your Miguel sensor ON for radiology scans (Xray, CT scan or MRI), however it is recommended that you use fingersticks for accurate readings during and 1 hour after an MRI as the accuracy of the sensor is questionable during this time.  If you need help with setting up your saige, starting a new sensor or other training, you can call [phone] or sign up at: [email or web address]  They can help you 1:1 by phone or virtual visit.  Contact the  if the sensor does not last the full 14 days " "for any reason, or if you have any other technical problems. Keep your Miguel sensor box with the lot and serial numbers as they often ask for this information.  -Miguel customer service phone number: [phone]     -Website for miguel replacement due to sensor failure or falling off: https://www.[employer]/us-en/support/sensor-support-form-questions.html  If your copay is more than $75 a month, call the copay assistance line at [phone] and they will work with your pharmacy to get your cost down.  Or, you can give the pharmacy the following information:      [id number], Group: [id number], Static ID: [id number], EXP: 12/31/25     Turn off automatic OS updates and do not update your phone's OS until you check your diabetes device 's website to verify that the medical apps you use are compatible with the new OS version. Turn off automatic OS updates by navigating to your system settings, usually accessible through a gear icon, and find the \"software update\" option; within this section, look for a toggle switch labeled \"automatic updates\" or similar, and disable it.    After updating your OS or adding a new accessory such as wireless headphones, confirm alert settings and then carefully monitor your medical device saige to make sure you can receive and hear alerts as expected.    At least once a month, check that your smartphone alerts are configured as expected. Ensure your volume, vibration, notifications, and other relevant settings still work.       Tips if your CGM is not staying on the full duration:   - Ensure skin is completely dry before placing CGM.  - Trim any hair in CGM location.   - Avoid using moisturizer in sensor location.  - Smooth out tape on CGM after inserting it.  - Easy does it.  Be careful to avoid catching your CGM on objects and take extra care toweling off after bathing.   - Can try adhesive agents (Skin Tac, Mastisol Liquid Adhesive)  * If using adhesive agents, apply an " empty oval on the skin prior to inserting the CGM. Make sure it is completely dry before placing the sensor.   - Overtape (Grif , Rockadex, Simpatch, KT tape, Hypafix, Tegaderm)  - Nonadhesive wraps if you are unable to tolerate additional adhesives (Coban, Ace, [id number])   * Wrap loosely    * Remove for overnight    * Consider for short term use (exercise, sports)    Tips if your CGM is causing skin irritation:  - Avoid alcohol use to clean skin. Use soap and water.   - Trim any hair in CGM location.  - Can try skin barriers (No-Sting skin prep by Webster and Nephew, Skin Tac).  - Antiperspirant prior to placing CGM.  - If the above tips are not enough, can try Flonase prior to inserting CGM. Recommend to use short term.    Tips to help remove your CGM:  - Products with moisturizing properties (lotion, baby oil, adhesive removers such as Unisolve).

## 2025-07-31 ENCOUNTER — OFFICE VISIT (OUTPATIENT)
Dept: ENDOCRINOLOGY | Facility: CLINIC | Age: 60
End: 2025-07-31
Payer: COMMERCIAL

## 2025-07-31 VITALS
WEIGHT: 186 LBS | DIASTOLIC BLOOD PRESSURE: 82 MMHG | SYSTOLIC BLOOD PRESSURE: 120 MMHG | BODY MASS INDEX: 34.23 KG/M2 | HEIGHT: 62 IN | HEART RATE: 93 BPM

## 2025-07-31 DIAGNOSIS — R80.9 TYPE 2 DIABETES MELLITUS WITH DIABETIC MICROALBUMINURIA, WITHOUT LONG-TERM CURRENT USE OF INSULIN (H): ICD-10-CM

## 2025-07-31 DIAGNOSIS — E11.29 TYPE 2 DIABETES MELLITUS WITH DIABETIC MICROALBUMINURIA, WITHOUT LONG-TERM CURRENT USE OF INSULIN (H): ICD-10-CM

## 2025-07-31 DIAGNOSIS — E06.3 HYPOTHYROIDISM DUE TO HASHIMOTO'S THYROIDITIS: ICD-10-CM

## 2025-07-31 DIAGNOSIS — E11.65 UNCONTROLLED TYPE 2 DIABETES MELLITUS WITH HYPERGLYCEMIA (H): Primary | ICD-10-CM

## 2025-07-31 NOTE — PROGRESS NOTES
Recent issues:  Hypothyroidism and diabetes follow-up evaluation  Met with [facility] ([facility]) in 4/2025, plan to start Mounjaro and the Libre3 Plus CGM... but not started yet  I have advised she start Mounjaro but she wished to wait until the colonoscopy procedure.   Had colonoscopy 6/30/25 but has not started Mounjaro medication...   Plans to start workouts with a  soon        Thyroid:  ~2006. Initial diagnosis of hypothyroidism   Had OBGYN for infertility evaluation with Dr. Kovacs/[facility]   Started levothyroxine medication  Typically taking levothyroxine 0.15 mg most often  No known history of thyroid nodules  FamHx thyroid disease: Sister- hypothyroidism    Had seen Dr. DANITZA [facility]/ [facility]  Subsequently saw Dr. Korina Vera/[facility]  Dose reductions with levothyroxine medication.  7/2021. Dose reduction levothyroxine to 0.125 mg daily  Has felt less anxious and tired on that dose  Previous [facility] thyroid tests include:   Lab Test 07/13/21  0934 03/05/21  1148 11/24/20  0913 09/14/18  0000 04/02/18  1030   TSH 0.08* <0.01* 0.04* 3.590 2.05   T4 1.56* 1.57* 1.68*  --   --            10/6/21. Initial endocrinology evaluation with me at [facility]  Reviewed health history, thyroid and diabetes endocrine issues  Lab testing, then dose increase of levothyroxine medication  Recent [facility] labs include:  Lab Results   Component Value Date    TSH 1.07 03/07/2025    T4 1.66 01/25/2024     (H) 12/10/2021     Current dose:  Levothyroxine 0.137 mg daily      Diabetes:  History of pre-diabetes  Took metformin during her infertility management with OBGYN, took for 1.5-2 yrs ~2006 Fall 2020. Diagnosis of diabetes mellitus  ?? 7/2021. Restarted metformin med use  11/2020. Labs showed elevated hgbA1c 6.8% indicating T2DM  Metformin dose changed as 500 mg 2-tabs BID, tolerates well   4/2025. Plan to start Mounjaro medication...  Previous [facility] hgbA1c trends include:   Lab Test 07/13/21  0934 03/05/21  1148  11/24/20  0913 03/29/19  0943 09/14/18  0000   A1C 6.5* 6.6* 6.8* 6.0* 6.0*   FamHx DM:  none  Current DM medications:    Metformin 500 mg 2-tabs in morning and evening    Blood glucose (BG) meter:  none  Previous [facility] hgbA1c trends include:  Lab Results   Component Value Date    A1C 10.7 (H) 03/07/2025    A1C 9.7 (H) 09/19/2024    A1C 8.2 (H) 01/25/2024    A1C 8.1 (H) 07/14/2023    A1C 6.6 (H) 12/10/2021        Recent [facility] labs include:  Lab Results   Component Value Date    A1C 10.7 (H) 03/07/2025     03/07/2025    POTASSIUM 4.6 03/07/2025    CHLORIDE 104 03/07/2025    CO2 26 03/07/2025    ANIONGAP 10 03/07/2025     (H) 03/07/2025    BUN 14.3 03/07/2025    CR 0.71 03/07/2025    GFRESTIMATED >90 03/07/2025    GFRESTBLACK >90 11/24/2020    MICKY 9.6 03/07/2025    CHOL 165 09/19/2024    TRIG 450 (H) 09/19/2024    HDL 31 (L) 09/19/2024    LDL  09/19/2024      Comment:      Cannot estimate LDL when triglyceride exceeds 400 mg/dL    LDL 69 09/19/2024    NHDL 134 (H) 09/19/2024    UCRR 58.9 03/07/2025    MICROL 29.0 03/07/2025    UMALCR 49.24 (H) 03/07/2025     DM Complications:    Nephropathy:    microalbuminuria      Lives in [place], [place], 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 Ashleigh Naranjo/[facility] for general medicine evaluations.  Had seen Dr. Kovacs/[facility]      PMH/PSH:  Past Medical History:   Diagnosis Date    Acquired hypothyroidism 2006    Benign essential hypertension     on med since 2016 or so    Hyperlipidemia     on rx since about 2014    Sleep apnea     wears cpap    Type 2 diabetes mellitus (H)     Microalbuminuria     Past Surgical History:   Procedure Laterality Date    COLONOSCOPY N/A 6/30/2025    Procedure: COLONOSCOPY, FLEXIBLE, WITH LESION REMOVAL USING SNARE;  Surgeon: Roberto Carlos Bland MD;  Location:  GI    LAPAROSCOPIC CHOLECYSTECTOMY  5/6/2014    Procedure: LAPAROSCOPIC CHOLECYSTECTOMY;  Surgeon: Chay Shafer MD;  Location:  OR        Family Hx:  Family History   Problem Relation Age of Onset    Hyperlipidemia Father     Family History Negative Mother          MVA     Breast Cancer Maternal Grandmother          Social Hx:  Social History     Socioeconomic History    Marital status:      Spouse name: Not on file    Number of children: 0    Years of education: Not on file    Highest education level: Not on file   Occupational History    Occupation: supervisor at [employer]   Tobacco Use    Smoking status: Never    Smokeless tobacco: Never   Vaping Use    Vaping status: Never Used   Substance and Sexual Activity    Alcohol use: Yes     Alcohol/week: 0.0 standard drinks of alcohol     Comment: rare    Drug use: No    Sexual activity: Yes     Partners: Male   Other Topics Concern    Parent/sibling w/ CABG, MI or angioplasty before 65F 55M? Not Asked   Social History Narrative    Not on file     Social Drivers of Health     Financial Resource Strain: Low Risk  (10/30/2024)    Financial Resource Strain     Within the past 12 months, have you or your family members you live with been unable to get utilities (heat, electricity) when it was really needed?: No   Food Insecurity: Low Risk  (10/30/2024)    Food Insecurity     Within the past 12 months, did you worry that your food would run out before you got money to buy more?: No     Within the past 12 months, did the food you bought just not last and you didn t have money to get more?: No   Transportation Needs: Low Risk  (10/30/2024)    Transportation Needs     Within the past 12 months, has lack of transportation kept you from medical appointments, getting your medicines, non-medical meetings or appointments, work, or from getting things that you need?: No   Physical Activity: Sufficiently Active (10/30/2024)    Exercise Vital Sign     Days of Exercise per Week: 4 days     Minutes of Exercise per Session: 60 min   Stress: No Stress Concern Present (10/30/2024)    [place] of  Occupational Health - Occupational Stress Questionnaire     Feeling of Stress : Not at all   Social Connections: Unknown (10/30/2024)    Social Connection and Isolation Panel [NHANES]     Frequency of Communication with Friends and Family: Not on file     Frequency of Social Gatherings with Friends and Family: More than three times a week     Attends Oriental orthodox Services: Not on file     Active Member of Clubs or Organizations: Not on file     Attends Club or Organization Meetings: Not on file     Marital Status: Not on file   Interpersonal Safety: Low Risk  (10/30/2024)    Interpersonal Safety     Do you feel physically and emotionally safe where you currently live?: Yes     Within the past 12 months, have you been hit, slapped, kicked or otherwise physically hurt by someone?: No     Within the past 12 months, have you been humiliated or emotionally abused in other ways by your partner or ex-partner?: No   Housing Stability: Low Risk  (10/30/2024)    Housing Stability     Do you have housing? : Yes     Are you worried about losing your housing?: No          MEDICATIONS:  has a current medication list which includes the following prescription(s): aspirin, cholecalciferol, cyanocobalamin, fexofenadine, fish oil-omega-3 fatty acids, fluticasone, levothyroxine, magnesium, metformin, multivitamin w/minerals, rosuvastatin, triamterene-hctz, turmeric, bisacodyl, blood glucose, blood glucose monitoring, blood pressure monitoring, freestyle roque 3 plus sensor, lactobacillus rhamnosus (gg), polyethylene glycol, thin, and tirzepatide.    ROS:     ROS: 10 point ROS neg other than the symptoms noted above in the HPI.    GENERAL: some fatigue, mild wt loss; denies fevers, chills, night sweats.   HEENT: no dysphagia, odonophagia, diplopia, neck pain  THYROID:  no apparent hyper or hypothyroid symptoms  CV: no chest pain, pressure, palpitations  LUNGS: no SOB, BAKER, cough, wheezing   ABDOMEN: some BM urgency; no nausea, diarrhea,  "constipation, abdominal pain  EXTREMITIES: no rashes, ulcers, edema  NEUROLOGY: no headaches, denies changes in vision, tingling, extremitiy numbness   MSK: arthralgias at hands, knees; denies muscle weakness  SKIN: no rashes or lesions  :  no menses since age 54, no hot flashes  PSYCH:  stable mood, no significant anxiety or depression  ENDOCRINE: no heat or cold intolerance      Physical Exam   VS: /82   Pulse 93   Ht 1.575 m (5' 2\")   Wt 84.4 kg (186 lb)   LMP 09/29/2015 (Approximate)   BMI 34.02 kg/m    GENERAL: AXOX3, NAD, well dressed, answering questions appropriately, appears stated age.  ENT: no nose swelling or nasal discharge, mouth redness or gum changes.  EYES: eyes grossly normal to inspection, conjunctivae and sclerae normal, no exophthalmos or proptosis  THYROID:  no apparent nodules or goiter  LUNGS: no audible wheeze, cough or visible cyanosis, or increased work of breathing  ABDOMEN: abdomen obese size  EXTREMITIES: feet not examined today, no pedal edema noted  NEUROLOGY: CN grossly intact, no tremors  MSK: grossly intact  SKIN:  no apparent skin lesions, rash, or edema with visualized skin appearance  PSYCH: mentation appears normal, affect normal/bright, judgement and insight intact,   normal speech and appearance well groomed    LABS:    All pertinent notes, labs, and images personally reviewed by me.     A/P:  Encounter Diagnoses   Name Primary?    Uncontrolled type 2 diabetes mellitus with hyperglycemia (H) Yes    Type 2 diabetes mellitus with diabetic microalbuminuria, without long-term current use of insulin (H)     Hypothyroidism due to Hashimoto's thyroiditis        Comments:  Reviewed health history, thyroid and diabetes issues.  We reviewed her rising hgbA1c trends, stressed the importance for more aggressive T2DM med management  Reviewed and interpreted tests that I previously ordered.   Ordered appropriate tests for the endocrinology disease management.    Management " options discussed and implemented after shared medical decision making with the patient.  Diabetes problem is chronic with exacerbation progression, hyperglycemia     Plan:  Discussed general issues with the diabetes diagnosis and management  We discussed the hgbA1c test which reflects previous overall glucose levels or control  Discussed the importance of blood glucose (BG) testing to assess glucose trends  Provided general overview of the diabetes medication options and medication treatment plan.    Reviewed general issues with the hypothyroidism diagnosis and management  Discussed lab tests used to assess patient thyroid hormone levels  Reviewed use of thyroid medication for hypothyroidism treatment    Recommend:   Continue current metformin twice daily dose plan  Reviewed her rising/high hgbA1c levels, importance of more aggressive T2DM med management  Reviewed rationale of using the GLP1RA med Mounjaro, also pen dosing technique  Encouraged her to start Mounjaro 2.5 mg subcutaneous weekly  Keep focus on diet, exercise, weight loss management.    exercise plan would be helpful, but unlikely to offer significant benefit for glycemic control  Advised starting the Libre3 Plus CGM   She felt comfortable inserting new sensor, using smartphone saige  My benefit from another [facility] followup appt  Repeat non-fasting lab tests in late 9/2025 or early 10/2025   Lab orders placed  Advise having fasting lipid panel testing and dilated eye examination, at least annually    Continue the current levothyroxine 0.137 mg daily dose plan, take daily on empty stomach  No thyroid U/S imaging needed at this time    Check with OBGYN or PCP physician regarding f/u DEXA imaging plan  Addressed patient questions today     The longitudinal plan of care for the endocrine problem(s) were addressed during this visit.  Due to added complexity of care,   we will continue to support the patient and the subsequent management  of this condition with ongoing continuity of care.    There are no Patient Instructions on file for this visit.    Future labs ordered today:   Orders Placed This Encounter   Procedures    Hemoglobin A1c    TSH with free T4 reflex     Radiology/Consults ordered today: None    Total time spent on day of encounter:  20 min    Follow-up:  10/2025, Sujata Durham MD, MS  Endocrinology  [facility]    CC:  Care Team

## 2025-08-23 DIAGNOSIS — E78.5 HYPERLIPIDEMIA, UNSPECIFIED HYPERLIPIDEMIA TYPE: Chronic | ICD-10-CM

## 2025-08-25 RX ORDER — ROSUVASTATIN CALCIUM 20 MG/1
20 TABLET, COATED ORAL AT BEDTIME
Qty: 30 TABLET | Refills: 0 | Status: SHIPPED | OUTPATIENT
Start: 2025-08-25

## 2025-08-28 ENCOUNTER — ALLIED HEALTH/NURSE VISIT (OUTPATIENT)
Dept: EDUCATION SERVICES | Facility: CLINIC | Age: 60
End: 2025-08-28
Payer: COMMERCIAL

## 2025-08-28 DIAGNOSIS — E11.9 TYPE 2 DIABETES MELLITUS WITHOUT COMPLICATION, WITHOUT LONG-TERM CURRENT USE OF INSULIN (H): Primary | ICD-10-CM

## (undated) RX ORDER — FENTANYL CITRATE 50 UG/ML
INJECTION, SOLUTION INTRAMUSCULAR; INTRAVENOUS
Status: DISPENSED
Start: 2025-06-30

## (undated) RX ORDER — FENTANYL CITRATE 50 UG/ML
INJECTION, SOLUTION INTRAMUSCULAR; INTRAVENOUS
Status: DISPENSED
Start: 2018-04-30